# Patient Record
Sex: FEMALE | Race: WHITE | NOT HISPANIC OR LATINO | Employment: FULL TIME | URBAN - METROPOLITAN AREA
[De-identification: names, ages, dates, MRNs, and addresses within clinical notes are randomized per-mention and may not be internally consistent; named-entity substitution may affect disease eponyms.]

---

## 2019-10-24 ENCOUNTER — PRE VISIT (OUTPATIENT)
Dept: OTOLARYNGOLOGY | Facility: CLINIC | Age: 29
End: 2019-10-24

## 2019-10-24 NOTE — TELEPHONE ENCOUNTER
FUTURE VISIT INFORMATION      FUTURE VISIT INFORMATION:    Date: 10/30/19    Time: 10:00am    Location: [facility] [place]  REFERRAL INFORMATION:    Referring provider:  Dr. Kimberly Catalan    Referring providers clinic:  [facility]    Reason for visit/diagnosis  VCD    RECORDS REQUESTED FROM:       Clinic name Comments Records Status Imaging Status   [facility] ENT OV 10/21/19- Hossein Epic/Care Everywhere

## 2019-10-30 ENCOUNTER — TELEPHONE (OUTPATIENT)
Dept: OTOLARYNGOLOGY | Facility: CLINIC | Age: 29
End: 2019-10-30

## 2019-10-30 NOTE — PROGRESS NOTES
[facility] Voice [facility] of the [facility] Otolaryngology Clinic           Patient: Alise Orona  MRN: [id number]    : 1990  Age/Gender: 29 year old female  Date of Service: 2019       Referring Provider   PCP: Ernestina Oleary    Referring Physician:   none      Reason for Consultation   Dyspnea   Dysphonia  Heartburn    History     HISTORY OF PRESENT ILLNESS: Ms. 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  She has been having trouble breathing for the past couple years.  Her breathing has been stable, without any recent acute worsening.  She has seen a number of doctors prior to this was diagnosed with asthma received an inhaler which did not help much.  She was also diagnosed with reflux and use Prilosec for 2 weeks.  This also did not help her breathing however after she stopped the medication she felt more heartburn symptoms.  She went to 2 different ENTs where she was advised that she needs a dilation she comes today for a third opinion.  She was intubated possibly for an ovarian cyst removal in  she cannot remember.  She has autoimmune labs pending from a prior ENT provider.  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  This is also been going on for years.  She denies dysphagia.      PAST MEDICAL HISTORY: No past medical history on file.      PAST SURGICAL HISTORY: No past surgical history on file.  OVARIAN CYST REMOVAL        CURRENT MEDICATIONS:   Current Outpatient Medications:      buPROPion (WELLBUTRIN XL) 300 MG 24 hr tablet, , Disp: , Rfl:      clonazePAM (KLONOPIN) 0.125 mg quarter-tab, Take 2 tablets by mouth 2 times daily, Disp: , Rfl: 1     escitalopram (LEXAPRO) 20 MG tablet, , Disp: , Rfl:      fluticasone (FLOVENT HFA) 110 MCG/ACT inhaler, Inhale 1 puff into the lungs 2 times daily, Disp: 1 Inhaler, Rfl: 1     norgestimate-ethinyl estradiol  (ORTHO-CYCLEN/SPRINTEC) 0.25-35 MG-MCG tablet, TAKE 1 TABLET BY MOUTH DAILY, Disp: , Rfl:      omeprazole (PRILOSEC) 40 MG DR capsule, Take 1 capsule (40 mg) by mouth 2 times daily, Disp: 60 capsule, Rfl: 0     traZODone (DESYREL) 50 MG tablet, Take 0.5 to 3 tabs 30-60 minutes prior to sleep as needed for insomnia, Disp: , Rfl:      ALPRAZolam (XANAX PO), Take 0.5 mg by mouth 3 times daily as needed for anxiety, Disp: , Rfl:      Amphetamine-Dextroamphetamine (ADDERALL PO), Take 45 mg by mouth, Disp: , Rfl:      Escitalopram Oxalate (LEXAPRO PO), Take 20 mg by mouth daily, Disp: , Rfl:      norgestimate-ethinyl estradiol (ORTHO-CYCLEN, SPRINTEC) 0.25-35 MG-MCG tablet, Take 1 tablet by mouth daily, Disp: , Rfl:      TRAZODONE HCL PO, Take 50 mg by mouth At Bedtime, Disp: , Rfl:       ALLERGIES: Patient has no known allergies.      SOCIAL HISTORY:    Social History     Socioeconomic History     Marital status: Single     Spouse name: Not on file     Number of children: Not on file     Years of education: Not on file     Highest education level: Not on file   Occupational History     Not on file   Social Needs     Financial resource strain: Not on file     Food insecurity:     Worry: Not on file     Inability: Not on file     Transportation needs:     Medical: Not on file     Non-medical: Not on file   Tobacco Use     Smoking status: Never Smoker     Smokeless tobacco: Never Used   Substance and Sexual Activity     Alcohol use: Not on file     Drug use: Not on file     Sexual activity: Not on file   Lifestyle     Physical activity:     Days per week: Not on file     Minutes per session: Not on file     Stress: Not on file   Relationships     Social connections:     Talks on phone: Not on file     Gets together: Not on file     Attends [employer] service: Not on file     Active member of club or organization: Not on file     Attends meetings of clubs or organizations: Not on file     Relationship status: Not on file      Intimate partner violence:     Fear of current or ex partner: Not on file     Emotionally abused: Not on file     Physically abused: Not on file     Forced sexual activity: Not on file   Other Topics Concern     Parent/sibling w/ CABG, MI or angioplasty before 65F 55M? Not Asked   Social History Narrative     Not on file           FAMILY HISTORY: Non-contributory for problems with anesthesia      REVIEW OF SYSTEMS:   The patient was asked a 14 point review of systems regarding constitutional symptoms, eye symptoms, ears, nose, mouth, throat symptoms, cardiovascular symptoms, respiratory symptoms, gastrointestinal symptoms, genitourinary symptoms, musculoskeletal symptoms, integumentary symptoms, neurological symptoms, psychiatric symptoms, endocrine symptoms, hematologic/lymphatic symptoms, and allergic/ immunologic symptoms.   The pertinent factors have been included in the HPI and below.    Physical Examination     The patient underwent a physical examination as described below. The pertinent positive and negative findings are summarized after the description of the examination.    Constitutional: The patient's developmental and nutritional status was assessed. The patient's voice quality was assessed.  Head and Face: The head and face were inspected for deformities. The sinuses were palpated. The salivary glands were palpated. Facial muscle strength was assessed bilaterally.  Eyes: Extraocular movements and primary gaze alignment were assessed.  Ears, Nose, Mouth and Throat: The ears and nose were examined for deformities. The nasal septum, mucosa, and turbinates were inspected by anterior rhinoscopy. The lips, teeth, and gums were examined for abnormalities. The oral mucosa, tongue, palate, tonsils, lateral and posterior pharynx were inspected for the presence of asymmetry or mucosal lesions.    Neck: The tracheal position was noted, and the neck mass palpated to determine if there were any asymmetries, abnormal  neck masses, thyromegally, or thyroid nodules.  Respiratory: The nature of the breathing and chest expansion/symmetry was observed.  Cardiovascular: The patient was examined to determine the presence of any edema or jugular venous distension.  Abdomen: The contour of the abdomen was noted.  Lymphatic: The patient was examined for infraclavicular lymphadenopathy.  Musculoskeletal: The patient was inspected for the presence of skeletal deformities.  Extremities: The extremities were examined for any clubbing or cyanosis.  Skin: The skin was examined for inflammatory or neoplastic conditions.  Neurologic: The patient's orientation, mood, and affect were noted. The cranial nerve  functions were examined.    Other pertinent positive and negative findings on physical examination:   each ear: EAC clear, TM intact, no effusion  Anterior rhinoscopy: no lesions  OC/OP: no lesions, uvula midline, soft palate elevates symmetrically, FOM/BOT soft, 2+ tonsils  Neck: no lesions, no TH tenderness to palpation, palpable landmarks despite thick neck    All other physical examination findings were within normal limits and noncontributory.    Procedures   Video Laryngoscopy with Stroboscopy (CPT 70114) and Behavioral & Qualitative Evaluation of Voice and Resonence     Preoperative Diagnosis:  Dysphonia and throat symptoms  Postoperative Diagnosis: Dysphonia and throat symptoms  Indication:  Patient has new or persistent dysphonia and throat symptoms.  This requires evaluation by stroboscopy to fully delineate the laryngeal functioning; especially mucosal wave assessment, ultrasharp visualization of lesions on the vocal folds, and overall functioning of the larynx.  Details of Procedure: A 70 degree rigid telescopic laryngoscope or a distal chip flexible scope was used. The lighting was obtained via a light cable connected to a stroboscopic unit. The telescope was inserted either transorally or transnasally until the vocal folds could  be visualized. The patient was instructed to sustain the vowel  ee  at a comfortable pitch and loudness as the voice was monitored by a microphone connected to a fundamental frequency tracker. This circuit tracked vocal periodicity, allowing the light to flash in synchrony with the glottal cycles. A setting on the stroboscope was set to change the phase of light strobing with relation to the vocal fundamental frequency, producing an image of 1 to 2 glottal cycles every second. The video images were recorded for analysis. Use of the variable speed, slow and stop scan allowed careful study of pertinent segments of laryngeal function to increase accuracy of clinical assessments of function and dysfunction.  In particular, features of glottal closure, mucosal wave on the vocal fold cover and laryngeal symmetry were analyzed. Lastly, the patient was asked to phonate speech samples and auditory/perceptual evaluation of voice and resonance were performed.  The vocal quality was carefully evaluated for hoarseness, breathiness, loudness, phrase length and intelligibility to determine the source of dysphonia.    Findings:   A. BEHAVIORAL & QUALITATIVE EVALUATION OF VOICE AND RESONENCE   Comments: F0 257 MPT: 10sec  Vocal Quality: no stridor, needs deep breaths every few words    Pitch Range:  Normal   Phrase Length:  Normal  Vocal Loudness: Normal  Dysarthria: No    B. LARYNGOVIDEOSTROBOSCOPY   Anatomic/Physiological Deviations:  Severe subglottic stenosis  Bilateral vocal process granuloma  Supraglottic hyper function  Mucosal waves with mucus in question of left mid vocal fold thickening  Lingual tonsillar hypetrohpy  Mucosal wave: Right:  No restriction     Left: No restriction  Bilateral Vocal Fold Vibration: Asymmetric  Vocal Process: Right: No restriction    Left:  No restriction  Vocal Fold closure: Complete glottal closure    Complication(s): None  Disposition: Patient tolerated the procedure well              Review  of Relevant Clinical Data     Notes:Kimberly Catalan MD 10/21/19     Radiology: CXR 19 - Negative chest. The lungs are clear and there are no pleural effusions. Normal heart size.    Pathology: none    Procedures: none    Labs:  No results found for: TSH  No results found for: NA, CO2, BUN, CREAT, GLUCOSE, PHOS  No results found for: WBC, HGB, HCT, MCV, PLT  No results found for: TERI  No components found for: RHEUMATOIDFACTOR,  RF  No results found for: CRP  No components found for: CKTOT, URICACID  No components found for: C3, C4, DSDNAAB, NDNAABIFA  No results found for: MPOAB     Impression & Plan     IMPRESSION: Ms. Orona is a 29 year old female who is being seen for the followin.  Subglottic stenosis  - dyspnea due to subglottic stenosis  - discussed etiology of trauma (intubation), autoimmune or idiopathic. In this case this is most likely due to prior intubation and GERD. Will follow up with her autoimmune panel   - discussed treatment options of observation, management of reflux, medical options with PO steroids, in office options of steroid injection, endoscopic option of Co2 laser, dilation and injection of steroid and open resection options.  - will start with endoscopic procedure first  - anti-reflux management optimized   - start steroid inhaler as well     2.  LPR  -Vocal process granulomas and evidence of LPR on examination today  -We will start omeprazole 40 mg in the morning before breakfast antireflux precautions    3. 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     Thank you for the kind referral and for allowing me to share in the care of Ms. Orona. If you have any questions, please do not hesitate to  contact me.        Nadine Hoffmann MD    of Otolaryngology - Head and Neck Surgery   Voice, Airway, and Swallowing Disorders   [facility] Voice Clinic at the [facility] Surgery [address] [address]  Phone: [phone]  Fax: [phone]    [address] [address]  Phone: [phone]  Fax: [phone]     CC:  Kimberly Catalan MD Kathleen A Parker

## 2019-10-30 NOTE — TELEPHONE ENCOUNTER
FUTURE VISIT INFORMATION      FUTURE VISIT INFORMATION:    Date: 11/1/19    Time: 12PM    [place] [facility] [place]  REFERRAL INFORMATION:    Referring provider:  Dr. Kimberly Catalan    Referring providers clinic:   [facility] Ot[facility]    Reason for visit/diagnosis  VCD    RECORDS REQUESTED FROM:       Clinic name Comments Records Status Imaging Status   [place] [address] Otolaryngology 10/21/19 notes with Dr Catalan    11/6/19 MICRO DIRECT LARYNGOSCOPY, BALLOON DILATION SUBGLOTTIC STENOSIS with kenaolg and mitomycin C with Dr Catalan (scheduled)  Care Everywhere      [facility]  01/02/2019 ED notes with Ranjit Vizcaino MD   Care Everywhere

## 2019-11-01 ENCOUNTER — PREP FOR PROCEDURE (OUTPATIENT)
Dept: OTOLARYNGOLOGY | Facility: CLINIC | Age: 29
End: 2019-11-01

## 2019-11-01 ENCOUNTER — PRE VISIT (OUTPATIENT)
Dept: OTOLARYNGOLOGY | Facility: CLINIC | Age: 29
End: 2019-11-01

## 2019-11-01 ENCOUNTER — OFFICE VISIT (OUTPATIENT)
Dept: OTOLARYNGOLOGY | Facility: CLINIC | Age: 29
End: 2019-11-01
Payer: COMMERCIAL

## 2019-11-01 VITALS — BODY MASS INDEX: 41.65 KG/M2 | WEIGHT: 250 LBS | HEIGHT: 65 IN

## 2019-11-01 DIAGNOSIS — J38.6 SUBGLOTTIC STENOSIS: Primary | ICD-10-CM

## 2019-11-01 DIAGNOSIS — K21.9 LPRD (LARYNGOPHARYNGEAL REFLUX DISEASE): ICD-10-CM

## 2019-11-01 DIAGNOSIS — R49.0 DYSPHONIA: ICD-10-CM

## 2019-11-01 DIAGNOSIS — J38.3 VOCAL PROCESS GRANULOMA: ICD-10-CM

## 2019-11-01 RX ORDER — ESCITALOPRAM OXALATE 20 MG/1
TABLET ORAL
COMMUNITY
Start: 2018-07-10 | End: 2021-12-21

## 2019-11-01 RX ORDER — MULTIVIT,TX WITH IRON,MINERALS
400 TABLET, EXTENDED RELEASE ORAL DAILY
COMMUNITY
End: 2022-12-15

## 2019-11-01 RX ORDER — NORGESTIMATE AND ETHINYL ESTRADIOL 0.25-0.035
KIT ORAL
COMMUNITY
Start: 2019-09-05 | End: 2019-11-04

## 2019-11-01 RX ORDER — CEFAZOLIN SODIUM 1 G/50ML
1 INJECTION, SOLUTION INTRAVENOUS SEE ADMIN INSTRUCTIONS
Status: CANCELLED | OUTPATIENT
Start: 2019-11-01

## 2019-11-01 RX ORDER — OMEPRAZOLE 40 MG/1
40 CAPSULE, DELAYED RELEASE ORAL 2 TIMES DAILY
Qty: 60 CAPSULE | Refills: 0 | Status: ON HOLD | OUTPATIENT
Start: 2019-11-01 | End: 2019-11-05

## 2019-11-01 RX ORDER — CLONAZEPAM 0.5 MG/1
2 TABLET ORAL 2 TIMES DAILY
Refills: 1 | COMMUNITY
Start: 2019-02-04 | End: 2021-12-21

## 2019-11-01 RX ORDER — CHLORAL HYDRATE 500 MG
2 CAPSULE ORAL DAILY
COMMUNITY
End: 2022-03-18

## 2019-11-01 RX ORDER — BUPROPION HYDROCHLORIDE 300 MG/1
300 TABLET ORAL EVERY MORNING
COMMUNITY
Start: 2017-05-09

## 2019-11-01 RX ORDER — MULTIPLE VITAMINS W/ MINERALS TAB 9MG-400MCG
1 TAB ORAL DAILY
COMMUNITY
End: 2022-03-18

## 2019-11-01 RX ORDER — CEFAZOLIN SODIUM 2 G/50ML
2 SOLUTION INTRAVENOUS
Status: CANCELLED | OUTPATIENT
Start: 2019-11-01

## 2019-11-01 RX ORDER — FLUTICASONE PROPIONATE 110 UG/1
1 AEROSOL, METERED RESPIRATORY (INHALATION) 2 TIMES DAILY
Qty: 1 INHALER | Refills: 1 | Status: SHIPPED | OUTPATIENT
Start: 2019-11-01 | End: 2019-12-01

## 2019-11-01 RX ORDER — TRAZODONE HYDROCHLORIDE 50 MG/1
TABLET, FILM COATED ORAL
COMMUNITY
Start: 2015-06-22 | End: 2022-06-17

## 2019-11-01 ASSESSMENT — PAIN SCALES - GENERAL: PAINLEVEL: NO PAIN (0)

## 2019-11-01 ASSESSMENT — MIFFLIN-ST. JEOR: SCORE: 1854.87

## 2019-11-01 NOTE — NURSING NOTE
Pre-surgery teaching completed for:  Endoscopic option of CO2 laser dilation and injection of steroid and open resection options   Physician:  Dr. Hoffmann    Teaching completed via phone: No  Teaching completed in clinic:  Yes    Teaching completed with Patient and mother   present Not applicable    Surgical booklet given  Yes  Pre-surgery scrub given Yes    Pneumovax guidelines given:  Not applicable    Reviewed pre-surgical guidelines including:    Pre-surgery physical exam requirements:  No, had it completed on 10/24/19 at [facility] requirements: Yes  Same-day surgery, must arrange for an adult to take you home and stay with you for the first 24 hours after surgery.  Stop drinking alcohol at least 24 hours before surgery.  Quit or at least cut down smoking as it higher your risks for infection after surgery. No chewing tobacco for at least 8 hours before surgery.  Take a bath or shower the night before and morning of surgery. Use antiseptic soap.

## 2019-11-01 NOTE — PATIENT INSTRUCTIONS
You were seen in the ENT Clinic today by Dr. Hoffmann  If you have any questions or concerns after your appointment, please call   -  ENT Clinic: [phone] scheduling option 1 and nurse advice option 3.    -  Recommendation: take reflux meds as prescribed.   -  Schedule surgery to open airway.             Thank you for choosing [facility] and allowing us to be apart of your care team!  Louann Young LPN

## 2019-11-01 NOTE — LETTER
2019       RE: Alise Orona  [address]  [place] [address]     Dear Colleague,    Thank you for referring your patient, Alise Orona, to the [facility] EAR NOSE AND THROAT at [facility]. Please see a copy of my visit note below.        [facility]ol[facility]           Patient: Alise Orona  MRN: [id number]    : 1990  Age/Gender: 29 year old female  Date of Service: 2019       Referring Provider   PCP: Ernestina Oleary    Referring Physician:   none      Reason for Consultation   Dyspnea   Dysphonia  Heartburn    History     HISTORY OF PRESENT ILLNESS: Ms. 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  She has been having trouble breathing for the past couple years.  Her breathing has been stable, without any recent acute worsening.  She has seen a number of doctors prior to this was diagnosed with asthma received an inhaler which did not help much.  She was also diagnosed with reflux and use Prilosec for 2 weeks.  This also did not help her breathing however after she stopped the medication she felt more heartburn symptoms.  She went to 2 different ENTs where she was advised that she needs a dilation she comes today for a third opinion.  She was intubated possibly for an ovarian cyst removal in  she cannot remember.  She has autoimmune labs pending from a prior ENT provider.  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  This is also been going on for years.  She denies dysphagia.      PAST MEDICAL HISTORY: No past medical history on file.      PAST SURGICAL HISTORY: No past surgical history on file.  OVARIAN CYST REMOVAL        CURRENT MEDICATIONS:   Current Outpatient Medications:      buPROPion (WELLBUTRIN XL) 300 MG 24 hr tablet, , Disp: , Rfl:      clonazePAM  (KLONOPIN) 0.125 mg quarter-tab, Take 2 tablets by mouth 2 times daily, Disp: , Rfl: 1     escitalopram (LEXAPRO) 20 MG tablet, , Disp: , Rfl:      fluticasone (FLOVENT HFA) 110 MCG/ACT inhaler, Inhale 1 puff into the lungs 2 times daily, Disp: 1 Inhaler, Rfl: 1     norgestimate-ethinyl estradiol (ORTHO-CYCLEN/SPRINTEC) 0.25-35 MG-MCG tablet, TAKE 1 TABLET BY MOUTH DAILY, Disp: , Rfl:      omeprazole (PRILOSEC) 40 MG DR capsule, Take 1 capsule (40 mg) by mouth 2 times daily, Disp: 60 capsule, Rfl: 0     traZODone (DESYREL) 50 MG tablet, Take 0.5 to 3 tabs 30-60 minutes prior to sleep as needed for insomnia, Disp: , Rfl:      ALPRAZolam (XANAX PO), Take 0.5 mg by mouth 3 times daily as needed for anxiety, Disp: , Rfl:      Amphetamine-Dextroamphetamine (ADDERALL PO), Take 45 mg by mouth, Disp: , Rfl:      Escitalopram Oxalate (LEXAPRO PO), Take 20 mg by mouth daily, Disp: , Rfl:      norgestimate-ethinyl estradiol (ORTHO-CYCLEN, SPRINTEC) 0.25-35 MG-MCG tablet, Take 1 tablet by mouth daily, Disp: , Rfl:      TRAZODONE HCL PO, Take 50 mg by mouth At Bedtime, Disp: , Rfl:       ALLERGIES: Patient has no known allergies.      SOCIAL HISTORY:    Social History     Socioeconomic History     Marital status: Single     Spouse name: Not on file     Number of children: Not on file     Years of education: Not on file     Highest education level: Not on file   Occupational History     Not on file   Social Needs     Financial resource strain: Not on file     Food insecurity:     Worry: Not on file     Inability: Not on file     Transportation needs:     Medical: Not on file     Non-medical: Not on file   Tobacco Use     Smoking status: Never Smoker     Smokeless tobacco: Never Used   Substance and Sexual Activity     Alcohol use: Not on file     Drug use: Not on file     Sexual activity: Not on file   Lifestyle     Physical activity:     Days per week: Not on file     Minutes per session: Not on file     Stress: Not on file    Relationships     Social connections:     Talks on phone: Not on file     Gets together: Not on file     Attends Yarsani service: Not on file     Active member of club or organization: Not on file     Attends meetings of clubs or organizations: Not on file     Relationship status: Not on file     Intimate partner violence:     Fear of current or ex partner: Not on file     Emotionally abused: Not on file     Physically abused: Not on file     Forced sexual activity: Not on file   Other Topics Concern     Parent/sibling w/ CABG, MI or angioplasty before 65F 55M? Not Asked   Social History Narrative     Not on file           FAMILY HISTORY: Non-contributory for problems with anesthesia      REVIEW OF SYSTEMS:   The patient was asked a 14 point review of systems regarding constitutional symptoms, eye symptoms, ears, nose, mouth, throat symptoms, cardiovascular symptoms, respiratory symptoms, gastrointestinal symptoms, genitourinary symptoms, musculoskeletal symptoms, integumentary symptoms, neurological symptoms, psychiatric symptoms, endocrine symptoms, hematologic/lymphatic symptoms, and allergic/ immunologic symptoms.   The pertinent factors have been included in the HPI and below.    Physical Examination     The patient underwent a physical examination as described below. The pertinent positive and negative findings are summarized after the description of the examination.    Constitutional: The patient's developmental and nutritional status was assessed. The patient's voice quality was assessed.  Head and Face: The head and face were inspected for deformities. The sinuses were palpated. The salivary glands were palpated. Facial muscle strength was assessed bilaterally.  Eyes: Extraocular movements and primary gaze alignment were assessed.  Ears, Nose, Mouth and Throat: The ears and nose were examined for deformities. The nasal septum, mucosa, and turbinates were inspected by anterior rhinoscopy. The lips,  teeth, and gums were examined for abnormalities. The oral mucosa, tongue, palate, tonsils, lateral and posterior pharynx were inspected for the presence of asymmetry or mucosal lesions.    Neck: The tracheal position was noted, and the neck mass palpated to determine if there were any asymmetries, abnormal neck masses, thyromegally, or thyroid nodules.  Respiratory: The nature of the breathing and chest expansion/symmetry was observed.  Cardiovascular: The patient was examined to determine the presence of any edema or jugular venous distension.  Abdomen: The contour of the abdomen was noted.  Lymphatic: The patient was examined for infraclavicular lymphadenopathy.  Musculoskeletal: The patient was inspected for the presence of skeletal deformities.  Extremities: The extremities were examined for any clubbing or cyanosis.  Skin: The skin was examined for inflammatory or neoplastic conditions.  Neurologic: The patient's orientation, mood, and affect were noted. The cranial nerve  functions were examined.    Other pertinent positive and negative findings on physical examination:   each ear: EAC clear, TM intact, no effusion  Anterior rhinoscopy: no lesions  OC/OP: no lesions, uvula midline, soft palate elevates symmetrically, FOM/BOT soft, 2+ tonsils  Neck: no lesions, no TH tenderness to palpation, palpable landmarks despite thick neck    All other physical examination findings were within normal limits and noncontributory.    Procedures   Video Laryngoscopy with Stroboscopy (CPT 54049) and Behavioral & Qualitative Evaluation of Voice and Resonence     Preoperative Diagnosis:  Dysphonia and throat symptoms  Postoperative Diagnosis: Dysphonia and throat symptoms  Indication:  Patient has new or persistent dysphonia and throat symptoms.  This requires evaluation by stroboscopy to fully delineate the laryngeal functioning; especially mucosal wave assessment, ultrasharp visualization of lesions on the vocal folds, and  overall functioning of the larynx.  Details of Procedure: A 70 degree rigid telescopic laryngoscope or a distal chip flexible scope was used. The lighting was obtained via a light cable connected to a stroboscopic unit. The telescope was inserted either transorally or transnasally until the vocal folds could be visualized. The patient was instructed to sustain the vowel  ee  at a comfortable pitch and loudness as the voice was monitored by a microphone connected to a fundamental frequency tracker. This circuit tracked vocal periodicity, allowing the light to flash in synchrony with the glottal cycles. A setting on the stroboscope was set to change the phase of light strobing with relation to the vocal fundamental frequency, producing an image of 1 to 2 glottal cycles every second. The video images were recorded for analysis. Use of the variable speed, slow and stop scan allowed careful study of pertinent segments of laryngeal function to increase accuracy of clinical assessments of function and dysfunction.  In particular, features of glottal closure, mucosal wave on the vocal fold cover and laryngeal symmetry were analyzed. Lastly, the patient was asked to phonate speech samples and auditory/perceptual evaluation of voice and resonance were performed.  The vocal quality was carefully evaluated for hoarseness, breathiness, loudness, phrase length and intelligibility to determine the source of dysphonia.    Findings:   A. BEHAVIORAL & QUALITATIVE EVALUATION OF VOICE AND RESONENCE   Comments: F0 257 MPT: 10sec  Vocal Quality: no stridor, needs deep breaths every few words    Pitch Range:  Normal   Phrase Length:  Normal  Vocal Loudness: Normal  Dysarthria: No    B. LARYNGOVIDEOSTROBOSCOPY   Anatomic/Physiological Deviations:  Severe subglottic stenosis  Bilateral vocal process granuloma  Supraglottic hyper function  Mucosal waves with mucus in question of left mid vocal fold thickening  Lingual tonsillar  hypetrohpy  Mucosal wave: Right:  No restriction     Left: No restriction  Bilateral Vocal Fold Vibration: Asymmetric  Vocal Process: Right: No restriction    Left:  No restriction  Vocal Fold closure: Complete glottal closure    Complication(s): None  Disposition: Patient tolerated the procedure well              Review of Relevant Clinical Data     Notes:Kimberly Catalan MD 10/21/19     Radiology: CXR 19 - Negative chest. The lungs are clear and there are no pleural effusions. Normal heart size.    Pathology: none    Procedures: none    Labs:  No results found for: TSH  No results found for: NA, CO2, BUN, CREAT, GLUCOSE, PHOS  No results found for: WBC, HGB, HCT, MCV, PLT  No results found for: TERI  No components found for: RHEUMATOIDFACTOR,  RF  No results found for: CRP  No components found for: CKTOT, URICACID  No components found for: C3, C4, DSDNAAB, NDNAABIFA  No results found for: MPOAB     Impression & Plan     IMPRESSION: Ms. Orona is a 29 year old female who is being seen for the followin.  Subglottic stenosis  - dyspnea due to subglottic stenosis  - discussed etiology of trauma (intubation), autoimmune or idiopathic. In this case this is most likely due to prior intubation and GERD. Will follow up with her autoimmune panel   - discussed treatment options of observation, management of reflux, medical options with PO steroids, in office options of steroid injection, endoscopic option of Co2 laser, dilation and injection of steroid and open resection options.  - will start with endoscopic procedure first  - anti-reflux management optimized   - start steroid inhaler as well     2.  LPR  -Vocal process granulomas and evidence of LPR on examination today  -We will start omeprazole 40 mg in the morning before breakfast antireflux precautions    3. 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     Thank you for the kind referral and for allowing me to share in the care of Ms. Orona. If you have any questions, please do not hesitate to contact me.        Nadine Hoffmann MD    of Otolaryngology - Head and Neck Surgery   Voice, Airway, and Swallowing Disorders   [facility] at the [facility] [place] [address]  Phone: [phone]  Fax: [phone]    [place] [address]  Phone: [phone]  Fax: [phone]     CC:  Kimberly Catalan MD Kathleen A Parker

## 2019-11-01 NOTE — NURSING NOTE
"Chief Complaint   Patient presents with     Consult     Subglottic stenosis     Height 1.651 m (5' 5\"), weight 113.4 kg (250 lb).    Kimberly Cortez, EMT    "

## 2019-11-05 ENCOUNTER — HOSPITAL ENCOUNTER (OUTPATIENT)
Facility: CLINIC | Age: 29
Discharge: HOME | End: 2019-11-05
Attending: OTOLARYNGOLOGY | Admitting: OTOLARYNGOLOGY
Payer: COMMERCIAL

## 2019-11-05 ENCOUNTER — ANESTHESIA EVENT (OUTPATIENT)
Dept: SURGERY | Facility: CLINIC | Age: 29
End: 2019-11-05
Payer: COMMERCIAL

## 2019-11-05 ENCOUNTER — ANESTHESIA (OUTPATIENT)
Dept: SURGERY | Facility: CLINIC | Age: 29
End: 2019-11-05
Payer: COMMERCIAL

## 2019-11-05 VITALS
SYSTOLIC BLOOD PRESSURE: 129 MMHG | HEIGHT: 65 IN | OXYGEN SATURATION: 94 % | RESPIRATION RATE: 16 BRPM | BODY MASS INDEX: 41.95 KG/M2 | DIASTOLIC BLOOD PRESSURE: 78 MMHG | WEIGHT: 251.77 LBS | HEART RATE: 79 BPM | TEMPERATURE: 98.5 F

## 2019-11-05 DIAGNOSIS — J38.6 SUBGLOTTIC STENOSIS: ICD-10-CM

## 2019-11-05 DIAGNOSIS — J38.3 VOCAL PROCESS GRANULOMA: ICD-10-CM

## 2019-11-05 DIAGNOSIS — K21.9 LPRD (LARYNGOPHARYNGEAL REFLUX DISEASE): ICD-10-CM

## 2019-11-05 LAB
GLUCOSE BLDC GLUCOMTR-MCNC: 101 MG/DL (ref 70–99)
HCG UR QL: NEGATIVE

## 2019-11-05 PROCEDURE — 71000027 ZZH RECOVERY PHASE 2 EACH 15 MINS: Performed by: OTOLARYNGOLOGY

## 2019-11-05 PROCEDURE — 36000062 ZZH SURGERY LEVEL 4 1ST 30 MIN - UMMC: Performed by: OTOLARYNGOLOGY

## 2019-11-05 PROCEDURE — 25000125 ZZHC RX 250: Performed by: NURSE ANESTHETIST, CERTIFIED REGISTERED

## 2019-11-05 PROCEDURE — 25800030 ZZH RX IP 258 OP 636: Performed by: ANESTHESIOLOGY

## 2019-11-05 PROCEDURE — 71000015 ZZH RECOVERY PHASE 1 LEVEL 2 EA ADDTL HR: Performed by: OTOLARYNGOLOGY

## 2019-11-05 PROCEDURE — 25000132 ZZH RX MED GY IP 250 OP 250 PS 637: Performed by: ANESTHESIOLOGY

## 2019-11-05 PROCEDURE — 36000064 ZZH SURGERY LEVEL 4 EA 15 ADDTL MIN - UMMC: Performed by: OTOLARYNGOLOGY

## 2019-11-05 PROCEDURE — 81025 URINE PREGNANCY TEST: CPT | Performed by: ANESTHESIOLOGY

## 2019-11-05 PROCEDURE — 25000128 H RX IP 250 OP 636: Performed by: NURSE ANESTHETIST, CERTIFIED REGISTERED

## 2019-11-05 PROCEDURE — 25000128 H RX IP 250 OP 636: Performed by: ANESTHESIOLOGY

## 2019-11-05 PROCEDURE — 82962 GLUCOSE BLOOD TEST: CPT

## 2019-11-05 PROCEDURE — 40000170 ZZH STATISTIC PRE-PROCEDURE ASSESSMENT II: Performed by: OTOLARYNGOLOGY

## 2019-11-05 PROCEDURE — 27210794 ZZH OR GENERAL SUPPLY STERILE: Performed by: OTOLARYNGOLOGY

## 2019-11-05 PROCEDURE — 25000125 ZZHC RX 250: Performed by: OTOLARYNGOLOGY

## 2019-11-05 PROCEDURE — C1726 CATH, BAL DIL, NON-VASCULAR: HCPCS | Performed by: OTOLARYNGOLOGY

## 2019-11-05 PROCEDURE — 37000009 ZZH ANESTHESIA TECHNICAL FEE, EACH ADDTL 15 MIN: Performed by: OTOLARYNGOLOGY

## 2019-11-05 PROCEDURE — 37000008 ZZH ANESTHESIA TECHNICAL FEE, 1ST 30 MIN: Performed by: OTOLARYNGOLOGY

## 2019-11-05 PROCEDURE — 71000014 ZZH RECOVERY PHASE 1 LEVEL 2 FIRST HR: Performed by: OTOLARYNGOLOGY

## 2019-11-05 PROCEDURE — 25000128 H RX IP 250 OP 636: Performed by: OTOLARYNGOLOGY

## 2019-11-05 PROCEDURE — 27211024 ZZHC OR SUPPLY OTHER OPNP: Performed by: OTOLARYNGOLOGY

## 2019-11-05 RX ORDER — ONDANSETRON 2 MG/ML
INJECTION INTRAMUSCULAR; INTRAVENOUS PRN
Status: DISCONTINUED | OUTPATIENT
Start: 2019-11-05 | End: 2019-11-05

## 2019-11-05 RX ORDER — ONDANSETRON 4 MG/1
4-8 TABLET, ORALLY DISINTEGRATING ORAL EVERY 8 HOURS PRN
Qty: 4 TABLET | Refills: 0 | Status: SHIPPED | OUTPATIENT
Start: 2019-11-05

## 2019-11-05 RX ORDER — NALOXONE HYDROCHLORIDE 0.4 MG/ML
.1-.4 INJECTION, SOLUTION INTRAMUSCULAR; INTRAVENOUS; SUBCUTANEOUS
Status: DISCONTINUED | OUTPATIENT
Start: 2019-11-05 | End: 2019-11-05 | Stop reason: HOSPADM

## 2019-11-05 RX ORDER — DEXAMETHASONE SODIUM PHOSPHATE 4 MG/ML
INJECTION, SOLUTION INTRA-ARTICULAR; INTRALESIONAL; INTRAMUSCULAR; INTRAVENOUS; SOFT TISSUE PRN
Status: DISCONTINUED | OUTPATIENT
Start: 2019-11-05 | End: 2019-11-05

## 2019-11-05 RX ORDER — OXYMETAZOLINE HYDROCHLORIDE 0.05 G/100ML
SPRAY NASAL PRN
Status: DISCONTINUED | OUTPATIENT
Start: 2019-11-05 | End: 2019-11-05 | Stop reason: HOSPADM

## 2019-11-05 RX ORDER — ACETAMINOPHEN 325 MG/1
975 TABLET ORAL ONCE
Status: COMPLETED | OUTPATIENT
Start: 2019-11-05 | End: 2019-11-05

## 2019-11-05 RX ORDER — CEFAZOLIN SODIUM 2 G/100ML
2 INJECTION, SOLUTION INTRAVENOUS
Status: COMPLETED | OUTPATIENT
Start: 2019-11-05 | End: 2019-11-05

## 2019-11-05 RX ORDER — CEFAZOLIN SODIUM 1 G/3ML
1 INJECTION, POWDER, FOR SOLUTION INTRAMUSCULAR; INTRAVENOUS SEE ADMIN INSTRUCTIONS
Status: DISCONTINUED | OUTPATIENT
Start: 2019-11-05 | End: 2019-11-05 | Stop reason: HOSPADM

## 2019-11-05 RX ORDER — PROPOFOL 10 MG/ML
INJECTION, EMULSION INTRAVENOUS CONTINUOUS PRN
Status: DISCONTINUED | OUTPATIENT
Start: 2019-11-05 | End: 2019-11-05

## 2019-11-05 RX ORDER — ACETAMINOPHEN 325 MG/1
650 TABLET ORAL
Status: DISCONTINUED | OUTPATIENT
Start: 2019-11-05 | End: 2019-11-05 | Stop reason: HOSPADM

## 2019-11-05 RX ORDER — CEPHALEXIN 500 MG/1
500 CAPSULE ORAL 2 TIMES DAILY
Qty: 10 CAPSULE | Refills: 0 | Status: SHIPPED | OUTPATIENT
Start: 2019-11-05 | End: 2019-11-10

## 2019-11-05 RX ORDER — FENTANYL CITRATE 50 UG/ML
INJECTION, SOLUTION INTRAMUSCULAR; INTRAVENOUS PRN
Status: DISCONTINUED | OUTPATIENT
Start: 2019-11-05 | End: 2019-11-05

## 2019-11-05 RX ORDER — ONDANSETRON 2 MG/ML
4 INJECTION INTRAMUSCULAR; INTRAVENOUS EVERY 30 MIN PRN
Status: DISCONTINUED | OUTPATIENT
Start: 2019-11-05 | End: 2019-11-05 | Stop reason: HOSPADM

## 2019-11-05 RX ORDER — PROPOFOL 10 MG/ML
INJECTION, EMULSION INTRAVENOUS PRN
Status: DISCONTINUED | OUTPATIENT
Start: 2019-11-05 | End: 2019-11-05

## 2019-11-05 RX ORDER — MEPERIDINE HYDROCHLORIDE 25 MG/ML
12.5 INJECTION INTRAMUSCULAR; INTRAVENOUS; SUBCUTANEOUS
Status: DISCONTINUED | OUTPATIENT
Start: 2019-11-05 | End: 2019-11-05 | Stop reason: HOSPADM

## 2019-11-05 RX ORDER — FENTANYL CITRATE 50 UG/ML
25-50 INJECTION, SOLUTION INTRAMUSCULAR; INTRAVENOUS EVERY 5 MIN PRN
Status: DISCONTINUED | OUTPATIENT
Start: 2019-11-05 | End: 2019-11-05 | Stop reason: HOSPADM

## 2019-11-05 RX ORDER — ONDANSETRON 4 MG/1
4 TABLET, ORALLY DISINTEGRATING ORAL EVERY 30 MIN PRN
Status: DISCONTINUED | OUTPATIENT
Start: 2019-11-05 | End: 2019-11-05 | Stop reason: HOSPADM

## 2019-11-05 RX ORDER — LIDOCAINE HYDROCHLORIDE 20 MG/ML
INJECTION, SOLUTION INFILTRATION; PERINEURAL PRN
Status: DISCONTINUED | OUTPATIENT
Start: 2019-11-05 | End: 2019-11-05

## 2019-11-05 RX ORDER — ONDANSETRON 4 MG/1
4 TABLET, ORALLY DISINTEGRATING ORAL
Status: DISCONTINUED | OUTPATIENT
Start: 2019-11-05 | End: 2019-11-05 | Stop reason: HOSPADM

## 2019-11-05 RX ORDER — TRIAMCINOLONE ACETONIDE 40 MG/ML
INJECTION, SUSPENSION INTRA-ARTICULAR; INTRAMUSCULAR PRN
Status: DISCONTINUED | OUTPATIENT
Start: 2019-11-05 | End: 2019-11-05 | Stop reason: HOSPADM

## 2019-11-05 RX ORDER — LIDOCAINE 40 MG/G
CREAM TOPICAL
Status: DISCONTINUED | OUTPATIENT
Start: 2019-11-05 | End: 2019-11-05 | Stop reason: HOSPADM

## 2019-11-05 RX ORDER — SODIUM CHLORIDE, SODIUM LACTATE, POTASSIUM CHLORIDE, CALCIUM CHLORIDE 600; 310; 30; 20 MG/100ML; MG/100ML; MG/100ML; MG/100ML
INJECTION, SOLUTION INTRAVENOUS CONTINUOUS
Status: DISCONTINUED | OUTPATIENT
Start: 2019-11-05 | End: 2019-11-05 | Stop reason: HOSPADM

## 2019-11-05 RX ORDER — ACETAMINOPHEN 325 MG/1
650 TABLET ORAL EVERY 4 HOURS PRN
Qty: 50 TABLET | Refills: 0 | Status: SHIPPED | OUTPATIENT
Start: 2019-11-05

## 2019-11-05 RX ORDER — LIDOCAINE HYDROCHLORIDE 40 MG/ML
SOLUTION TOPICAL PRN
Status: DISCONTINUED | OUTPATIENT
Start: 2019-11-05 | End: 2019-11-05 | Stop reason: HOSPADM

## 2019-11-05 RX ORDER — OMEPRAZOLE 40 MG/1
40 CAPSULE, DELAYED RELEASE ORAL DAILY
Qty: 60 CAPSULE | Refills: 0 | Status: SHIPPED | OUTPATIENT
Start: 2019-11-05 | End: 2020-01-27

## 2019-11-05 RX ADMIN — FENTANYL CITRATE 50 MCG: 50 INJECTION, SOLUTION INTRAMUSCULAR; INTRAVENOUS at 13:00

## 2019-11-05 RX ADMIN — FENTANYL CITRATE 150 MCG: 50 INJECTION, SOLUTION INTRAMUSCULAR; INTRAVENOUS at 11:06

## 2019-11-05 RX ADMIN — PROPOFOL 50 MG: 10 INJECTION, EMULSION INTRAVENOUS at 11:40

## 2019-11-05 RX ADMIN — ONDANSETRON 4 MG: 2 INJECTION INTRAMUSCULAR; INTRAVENOUS at 11:30

## 2019-11-05 RX ADMIN — PROPOFOL 180 MG: 10 INJECTION, EMULSION INTRAVENOUS at 10:42

## 2019-11-05 RX ADMIN — ACETAMINOPHEN 975 MG: 325 TABLET, FILM COATED ORAL at 13:15

## 2019-11-05 RX ADMIN — SODIUM CHLORIDE, POTASSIUM CHLORIDE, SODIUM LACTATE AND CALCIUM CHLORIDE: 600; 310; 30; 20 INJECTION, SOLUTION INTRAVENOUS at 10:33

## 2019-11-05 RX ADMIN — PROPOFOL 20 MG: 10 INJECTION, EMULSION INTRAVENOUS at 11:05

## 2019-11-05 RX ADMIN — FENTANYL CITRATE 25 MCG: 50 INJECTION, SOLUTION INTRAMUSCULAR; INTRAVENOUS at 12:44

## 2019-11-05 RX ADMIN — CEFAZOLIN SODIUM 2 G: 2 INJECTION, SOLUTION INTRAVENOUS at 10:48

## 2019-11-05 RX ADMIN — LIDOCAINE HYDROCHLORIDE 100 MG: 20 INJECTION, SOLUTION INFILTRATION; PERINEURAL at 10:42

## 2019-11-05 RX ADMIN — DEXAMETHASONE SODIUM PHOSPHATE 10 MG: 4 INJECTION, SOLUTION INTRA-ARTICULAR; INTRALESIONAL; INTRAMUSCULAR; INTRAVENOUS; SOFT TISSUE at 10:49

## 2019-11-05 RX ADMIN — PROPOFOL 200 MCG/KG/MIN: 10 INJECTION, EMULSION INTRAVENOUS at 10:42

## 2019-11-05 RX ADMIN — FENTANYL CITRATE 50 MCG: 50 INJECTION, SOLUTION INTRAMUSCULAR; INTRAVENOUS at 10:42

## 2019-11-05 RX ADMIN — MIDAZOLAM 2 MG: 1 INJECTION INTRAMUSCULAR; INTRAVENOUS at 10:33

## 2019-11-05 ASSESSMENT — MIFFLIN-ST. JEOR: SCORE: 1862.88

## 2019-11-05 NOTE — DISCHARGE INSTRUCTIONS
[facility]  Same-Day Surgery   Adult Discharge Orders & Instructions     For 24 hours after surgery    1. Get plenty of rest.  A responsible adult must stay with you for at least 24 hours after you leave the hospital.   2. Do not drive or use heavy equipment.  If you have weakness or tingling, don't drive or use heavy equipment until this feeling goes away.  3. Do not drink alcohol.  4. Avoid strenuous or risky activities.  Ask for help when climbing stairs.   5. You may feel lightheaded.  IF so, sit for a few minutes before standing.  Have someone help you get up.   6. If you have nausea (feel sick to your stomach): Drink only clear liquids such as apple juice, ginger ale, broth or 7-Up.  Rest may also help.  Be sure to drink enough fluids.  Move to a regular diet as you feel able.  7. You may have a slight fever. Call the doctor if your fever is over 100 F (37.7 C) (taken under the tongue) or lasts longer than 24 hours.  8. You may have a dry mouth, a sore throat, muscle aches or trouble sleeping.  These should go away after 24 hours.  9. Do not make important or legal decisions.   Call your doctor for any of the followin.  Signs of infection (fever, growing tenderness at the surgery site, a large amount of drainage or bleeding, severe pain, foul-smelling drainage, redness, swelling).    2. It has been over 8 to 10 hours since surgery and you are still not able to urinate (pass water).    3.  Headache for over 24 hours.    To contact a doctor, call Dr. Hoffmann at her clinic call [phone] or:        [phone] and ask for the resident on call for   ENT (answered 24 hours a day)      Emergency Department:    [facility]: [phone]       (TTY for hearing impaired: [phone]

## 2019-11-05 NOTE — ANESTHESIA PREPROCEDURE EVALUATION
Anesthesia Pre-Procedure Evaluation    Patient: Alise Orona   MRN:     [id number] Gender:   female   Age:    29 year old :      1990        Preoperative Diagnosis: Subglottic stenosis [J38.6]   Procedure(s):  microdirect laryngoscopy, flexible bronchoscopy, flexible CO2 laser laryngoscopy with excision of stenosis, laryngoscopy with dilation, possible flexible esophagoscopy  INJECTION, STEROID     History reviewed. No pertinent past medical history.   Past Surgical History:   Procedure Laterality Date     GYN SURGERY      lap removal of ovarian cyst           Anesthesia Evaluation     .             ROS/MED HX    ENT/Pulmonary: Comment: Subglottic stenosis causing loud breathing, occasional SOB      Neurologic:  - neg neurologic ROS     Cardiovascular:  - neg cardiovascular ROS       METS/Exercise Tolerance:     Hematologic:  - neg hematologic  ROS       Musculoskeletal:  - neg musculoskeletal ROS       GI/Hepatic:  - neg GI/hepatic ROS       Renal/Genitourinary:  - ROS Renal section negative       Endo:     (+) Obesity, .      Psychiatric:  - neg psychiatric ROS       Infectious Disease:  - neg infectious disease ROS       Malignancy:         Other:                         PHYSICAL EXAM:   Mental Status/Neuro:    Airway: Facies: Feasible  Mallampati: I  Mouth/Opening: Full  TM distance: > 6 cm  Neck ROM: Full   Respiratory: Auscultation: CTAB     Resp. Rate: Normal     Resp. Effort: Normal      CV: Rhythm: Regular  Rate: Age appropriate  Heart: Normal Sounds  Edema: None   Comments:      Dental: Normal Dentition                LABS:  CBC: No results found for: WBC, HGB, HCT, PLT  BMP: No results found for: NA, POTASSIUM, CHLORIDE, CO2, BUN, CR, GLC  COAGS: No results found for: PTT, INR, FIBR  POC:   Lab Results   Component Value Date     (H) 2019    HCG Negative 2019     OTHER: No results found for: PH, LACT, A1C, MONTEZ, PHOS, MAG, ALBUMIN, PROTTOTAL, ALT, AST, GGT, ALKPHOS, BILITOTAL,  "BILIDIRECT, LIPASE, AMYLASE, PRISCILLA, TSH, T4, T3, CRP, SED     Preop Vitals    BP Readings from Last 3 Encounters:   11/05/19 119/62    Pulse Readings from Last 3 Encounters:   11/05/19 82      Resp Readings from Last 3 Encounters:   11/05/19 18    SpO2 Readings from Last 3 Encounters:   11/05/19 99%      Temp Readings from Last 1 Encounters:   11/05/19 36.8  C (98.2  F) (Oral)    Ht Readings from Last 1 Encounters:   11/05/19 1.651 m (5' 5\")      Wt Readings from Last 1 Encounters:   11/05/19 114.2 kg (251 lb 12.3 oz)    Estimated body mass index is 41.9 kg/m  as calculated from the following:    Height as of this encounter: 1.651 m (5' 5\").    Weight as of this encounter: 114.2 kg (251 lb 12.3 oz).     LDA:  Peripheral IV 11/05/19 Right Hand (Active)   Number of days: 0       Airway - Adult/Peds laryngeal mask airway (Active)   Number of days: 0        Assessment:   ASA SCORE: 2    H&P: History and physical reviewed and following examination; no interval change.   Smoking Status:  Non-Smoker/Unknown   NPO Status: NPO Appropriate     Plan:   Anes. Type:  General   Pre-Medication: Midazolam   Induction:  IV (Standard)   Airway: ETT; Oral   Access/Monitoring: PIV   Maintenance: Balanced     Postop Plan:   Postop Pain: None  Postop Sedation/Airway: Not planned  Disposition: Outpatient     PONV Management:   Adult Risk Factors: Female, Non-Smoker   Prevention: Ondansetron, Dexamethasone     CONSENT: Direct conversation   Plan and risks discussed with: Patient   Blood Products: Consent Deferred (Minimal Blood Loss)                   Claribel Gonzalez MD  "

## 2019-11-05 NOTE — OP NOTE
Operative Note   Otolaryngology - Head and Neck Surgery       DATE OF OPERATION:   November 5, 2019    PREOPERATIVE DIAGNOSIS:   Subglottic laryngeal stenosis.      POSTOPERATIVE DIAGNOSIS:   Same    NAME OF OPERATION:   1. Flexible bronchoscopy with CO2 laser of tracheal stenosis.   2. Flexible bronchoscopy with balloon dilation of tracheal stenosis to 12 mmHg.   3. Flexible bronchoscopy with injection of kenalog steroid to subglottic larynx  4. Flexible esophagoscopy     ANESTHESIA  Type: General  ETT: LMA    SURGEON:   Nadine Hoffmann MD    INDICATIONS FOR PROCEDURE:   The patient is a 29 year old female with a history of subglottic and tracheal stenosis. She is being brought to the Operating Room for resection of this cancer. Risks, benefits, alternatives, and rationale for the procedure(s) listed above were discussed with the patient, and the patient wishes to proceed with surgery and has signed an informed consent.     FINDINGS:   1. Flexible bronchoscopy for all procedure, no laryngoscopy  2. 90% stenosis initially with a thick long segment of stenosis at the cricoid level   3. Dilated to 12mmHg     DESCRIPTION OF PROCEDURE:   The patient was brought into the operating room and placed supine on the operating room table. A time-out was performed. General anesthesia was induced. The patient was easily mask ventilated. Then the patient was ventilated with a LMA.     The head of the bed was turned 90 degrees to the right. A flexible bronchoscope was placed through the LMA. 4% LTA was infused over the glottic entry. Once the topical anesthesia had been placed, the flexible bronchoscope was placed through the vocal cords. The patient had evidence of approximately 90% subglottic and tracheal narrowing. There was a scar bridge that was thick cone shaped at the level of the cricoid petering to 90% narrowing most distally.     The procedure began with first performing CO2 laser. The flexible waveguide CO2 laser was  threaded through the bronchoscope. A laser time-out was performed. The patient's face and eyes were protected with moist towels. The oxygen was subsequently reduced to less than 30%. Radial cuts were made within the scar band starting anteriorly through the scar bridge and then more cuts were made laterally and posteriorly. The patient was then allowed to obtain adequate saturation and the balloon dilator was then placed through the flexible bronchoscope. Dilations were performed to 12 mmHg. Once the subglottic area had been adequately dilated, the patient was once again allowed to obtain 100% saturation. Then 2.0 ml of kenalog 40 steroid  was injected into the scar using a sclerotherapy needle and the side channel of the bronch. Hemostasis was confirmed.     Then, a flexible esophagoscope was inserted through the oral cavity to the cricopharyngeus muscle and further to the body of the esophagus, then to the lower esophageal sphincter.  The stomach was visualized upon passing through the LES.  The findings were as follows: no lesions, no irregular z-line or shot segment Barretts, no hiatal hernia.   Esophagoscopy was performed one more time to ensure there was no complications such as a false tract.     This was the end of our procedure. Hemostasis was confirmed. Photodocumentation was performed. All scopes were removed and the patient was then handed back to the care of anesthesia who awoke and extubated the patient without complication.    COMPLICATIONS:   None.  .   ESTIMATED BLOOD LOSS:   Less than 10cc    DISPOSITION:   PACU.    SPECIMENS:  * No specimens in log *       PHOTODOCUMENTATION:

## 2019-11-05 NOTE — BRIEF OP NOTE
[facility], [place]    Brief Operative Note    Pre-operative diagnosis: Subglottic stenosis [J38.6]  Post-operative diagnosis Same as pre-operative diagnosis    Procedure: Procedure(s):  microdirect laryngoscopy, flexible bronchoscopy, flexible CO2 laser laryngoscopy with excision of stenosis, laryngoscopy with dilation, flexible esophagoscopy  INJECTION, STEROID  Surgeon: Surgeon(s) and Role:  Panel 1:     * Nadine Ayers MD - Primary     * Mauri Su MD - Resident - Assisting  Panel 2:     * Nadine Ayers MD - Primary  Anesthesia: General   Estimated blood loss: Minimal  Drains: None  Specimens: * No specimens in log *  Findings:   grade III subglottic stenosis.  Complications: None.  Implants: * No implants in log *

## 2019-11-05 NOTE — ANESTHESIA CARE TRANSFER NOTE
Patient: Alise Orona    Procedure(s):  microdirect laryngoscopy, flexible bronchoscopy, flexible CO2 laser laryngoscopy with excision of stenosis, laryngoscopy with dilation, flexible esophagoscopy  INJECTION, STEROID    Diagnosis: Subglottic stenosis [J38.6]  Diagnosis Additional Information: No value filed.    Anesthesia Type:   General     Note:  Airway :Nasal Cannula  Patient transferred to:PACU  Comments: Tolerated anesthesia well. C/o sore throat. Awake, VSSHandoff Report: Identifed the Patient, Identified the Reponsible Provider, Reviewed the pertinent medical history, Discussed the surgical course, Reviewed Intra-OP anesthesia mangement and issues during anesthesia, Set expectations for post-procedure period and Allowed opportunity for questions and acknowledgement of understanding      Vitals: (Last set prior to Anesthesia Care Transfer)    CRNA VITALS  11/5/2019 1147 - 11/5/2019 1223      11/5/2019             Resp Rate (observed):  11                Electronically Signed By: ITA Mcnulty CRNA  November 5, 2019  12:23 PM

## 2019-11-05 NOTE — ANESTHESIA POSTPROCEDURE EVALUATION
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       Anesthesia Type:  Not documented  General    Reportable Event: NO     PAIN: Uncomplicated   Sign Out status: Comfortable, Well controlled pain     PONV: No PONV   Sign Out status:  No Nausea or Vomiting     Neuro/Psych: Uneventful perioperative course   Sign Out Status: Preoperative baseline; Age appropriate mentation     Airway/Resp.: Uneventful perioperative course   Sign Out Status: Non labored breathing, age appropriate RR; Resp. Status within EXPECTED Parameters     CV: Uneventful perioperative course   Sign Out status: Appropriate BP and perfusion indices; Appropriate HR/Rhythm     Disposition:   Sign Out in:  PACU  Disposition:  Phase II; Home  Recovery Course: Uneventful  Follow-Up: Not required           Last Anesthesia Record Vitals:  CRNA VITALS  2019 1147 - 2019 1247      2019             Resp Rate (observed):  11          Last PACU Vitals:  Vitals Value Taken Time   /82 2019  1:15 PM   Temp 36.7  C (98  F) 2019 12:45 PM   Pulse 65 2019  1:10 PM   Resp 10 2019  1:15 PM   SpO2 98 % 2019  1:17 PM   Temp src     NIBP     Pulse     SpO2     Resp     Temp     Ht Rate     Temp 2     Vitals shown include unvalidated device data.      Electronically Signed By: Claribel Gonzalez MD, 2019, 1:18 PM

## 2019-11-13 NOTE — PROGRESS NOTES
[facility]       Patient: Alise Orona  MRN: [id number]    : 1990  Age/Gender: 29 year old female  Date of Service: 2019     Chief Complaint   Subglottic stenosis  S/p MDL with CO2 laser resection, dilation and steroid injection on 19    Interval History     HISTORY OF PRESENT ILLNESS: Ms. 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 Today she is doing well she reports her breathing is much improved since the surgery.  She denies any complaints.  She completed her antibiotics and is still taking the inhaler and the antireflux medication.  She is wondering if reflux is the cause for her narrowing she also has episodes of binging and she is wondering how this is affecting her stenosis.  Her voice is also still raspy since the surgery slightly improved but slightly different still.  She works at [employer] and has a lot of voice use.  She had a few rheumatology labs checked but had not been formally evaluated by rheumatology in the past.    Procedure history:   S/p MDL with CO2 laser resection, dilation and steroid injection on 19      PAST MEDICAL HISTORY: No past medical history on file.      PAST SURGICAL HISTORY:   Past Surgical History:   Procedure Laterality Date     GYN SURGERY      lap removal of ovarian cyst      INJECT STEROID (LOCATION) N/A 2019    Procedure: INJECTION, STEROID;  Surgeon: Nadine Ayers MD;  Location: [facility] OR     LASER CO2 LARYNGOSCOPY, COMPLEX N/A 2019    Procedure: microdirect laryngoscopy, flexible bronchoscopy, flexible CO2 laser laryngoscopy with excision of stenosis, laryngoscopy with dilation, flexible esophagoscopy;  Surgeon: Nadine Ayers MD;  Location: [facility] OR         CURRENT MEDICATIONS:   Current Outpatient Medications:      acetaminophen  (TYLENOL) 325 MG tablet, Take 2 tablets (650 mg) by mouth every 4 hours as needed for mild pain, Disp: 50 tablet, Rfl: 0     buPROPion (WELLBUTRIN XL) 300 MG 24 hr tablet, , Disp: , Rfl:      clonazePAM (KLONOPIN) 0.125 mg quarter-tab, Take 2 tablets by mouth 2 times daily, Disp: , Rfl: 1     escitalopram (LEXAPRO) 20 MG tablet, , Disp: , Rfl:      Escitalopram Oxalate (LEXAPRO PO), Take 20 mg by mouth daily, Disp: , Rfl:      fish oil-omega-3 fatty acids 1000 MG capsule, Take 2 g by mouth daily, Disp: , Rfl:      fluticasone (FLOVENT HFA) 110 MCG/ACT inhaler, Inhale 1 puff into the lungs 2 times daily, Disp: 1 Inhaler, Rfl: 1     magnesium gluconate (MAGONATE) 250 MG tablet, Take 400 mg by mouth daily, Disp: , Rfl:      multivitamin w/minerals (MULTI-VITAMIN) tablet, Take 1 tablet by mouth daily, Disp: , Rfl:      norgestimate-ethinyl estradiol (ORTHO-CYCLEN, SPRINTEC) 0.25-35 MG-MCG tablet, Take 1 tablet by mouth daily, Disp: , Rfl:      omeprazole (PRILOSEC) 40 MG DR capsule, Take 1 capsule (40 mg) by mouth daily Before breakfast, Disp: 60 capsule, Rfl: 0     ondansetron (ZOFRAN-ODT) 4 MG ODT tab, Take 1-2 tablets (4-8 mg) by mouth every 8 hours as needed for nausea, Disp: 4 tablet, Rfl: 0     sulfamethoxazole-trimethoprim (BACTRIM DS/SEPTRA DS) 800-160 MG tablet, Take 1 tablet by mouth 2 times daily, Disp: 20 tablet, Rfl: 0     traZODone (DESYREL) 50 MG tablet, Take 0.5 to 3 tabs 30-60 minutes prior to sleep as needed for insomnia, Disp: , Rfl:      TRAZODONE HCL PO, Take 50 mg by mouth At Bedtime, Disp: , Rfl:       ALLERGIES: Patient has no known allergies.      SOCIAL HISTORY:    Social History     Socioeconomic History     Marital status: Single     Spouse name: Not on file     Number of children: Not on file     Years of education: Not on file     Highest education level: Not on file   Occupational History     Not on file   Social Needs     Financial resource strain: Not on file     Food insecurity:      Worry: Not on file     Inability: Not on file     Transportation needs:     Medical: Not on file     Non-medical: Not on file   Tobacco Use     Smoking status: Never Smoker     Smokeless tobacco: Never Used   Substance and Sexual Activity     Alcohol use: Yes     Comment: occas     Drug use: Never     Sexual activity: Not on file   Lifestyle     Physical activity:     Days per week: Not on file     Minutes per session: Not on file     Stress: Not on file   Relationships     Social connections:     Talks on phone: Not on file     Gets together: Not on file     Attends Synagogue service: Not on file     Active member of club or organization: Not on file     Attends meetings of clubs or organizations: Not on file     Relationship status: Not on file     Intimate partner violence:     Fear of current or ex partner: Not on file     Emotionally abused: Not on file     Physically abused: Not on file     Forced sexual activity: Not on file   Other Topics Concern     Parent/sibling w/ CABG, MI or angioplasty before 65F 55M? Not Asked   Social History Narrative     Not on file           FAMILY HISTORY: Non-contributory for problems with anesthesia      REVIEW OF SYSTEMS:   The patient was asked a 14 point review of systems regarding constitutional symptoms, eye symptoms, ears, nose, mouth, throat symptoms, cardiovascular symptoms, respiratory symptoms, gastrointestinal symptoms, genitourinary symptoms, musculoskeletal symptoms, integumentary symptoms, neurological symptoms, psychiatric symptoms, endocrine symptoms, hematologic/lymphatic symptoms, and allergic/ immunologic symptoms.   The pertinent factors have been included in the HPI and below.  Patient Supplied Answers to Review of Systems  [facility] ROS 11/14/2019   Psychology Frequently feeling anxious   Ears, Nose, Throat Nasal congestion or drainage           Physical Examination     The patient underwent a physical examination as described below. The pertinent positive  and negative findings are summarized after the description of the examination.    Constitutional: The patient's developmental and nutritional status was assessed. The patient's voice quality was assessed.  Head and Face: The head and face were inspected for deformities. The sinuses were palpated. The salivary glands were palpated. Facial muscle strength was assessed bilaterally.  Eyes: Extraocular movements and primary gaze alignment were assessed.  Ears, Nose, Mouth and Throat: The ears and nose were examined for deformities. The nasal septum, mucosa, and turbinates were inspected by anterior rhinoscopy. The lips, teeth, and gums were examined for abnormalities. The oral mucosa, tongue, palate, tonsils, lateral and posterior pharynx were inspected for the presence of asymmetry or mucosal lesions.    Neck: The tracheal position was noted, and the neck mass palpated to determine if there were any asymmetries, abnormal neck masses, thyromegally, or thyroid nodules.  Respiratory: The nature of the breathing and chest expansion/symmetry was observed.  Cardiovascular: The patient was examined to determine the presence of any edema or jugular venous distension.  Abdomen: The contour of the abdomen was noted.  Lymphatic: The patient was examined for infraclavicular lymphadenopathy.  Musculoskeletal: The patient was inspected for the presence of skeletal deformities.  Extremities: The extremities were examined for any clubbing or cyanosis.  Skin: The skin was examined for inflammatory or neoplastic conditions.  Neurologic: The patient's orientation, mood, and affect were noted. The cranial nerve  functions were examined.    Other pertinent positive and negative findings on physical examination:   OC/OP: no lesions, uvula midline, soft palate elevates symmetrically, FOM/BOT soft  Neck: no lesions, no TH tenderness to palpation    All other physical examination findings were within normal limits and  noncontributory.    Procedures   Video Laryngoscopy with Stroboscopy (CPT 59599) and Behavioral & Qualitative Evaluation of Voice and Resonence     Preoperative Diagnosis:  Dysphonia and throat symptoms  Postoperative Diagnosis: Dysphonia and throat symptoms  Indication:  Patient has new or persistent dysphonia and throat symptoms.  This requires evaluation by stroboscopy to fully delineate the laryngeal functioning; especially mucosal wave assessment, ultrasharp visualization of lesions on the vocal folds, and overall functioning of the larynx.  Details of Procedure: A 70 degree rigid telescopic laryngoscope or a distal chip flexible scope was used. The lighting was obtained via a light cable connected to a stroboscopic unit. The telescope was inserted either transorally or transnasally until the vocal folds could be visualized. The patient was instructed to sustain the vowel  ee  at a comfortable pitch and loudness as the voice was monitored by a microphone connected to a fundamental frequency tracker. This circuit tracked vocal periodicity, allowing the light to flash in synchrony with the glottal cycles. A setting on the stroboscope was set to change the phase of light strobing with relation to the vocal fundamental frequency, producing an image of 1 to 2 glottal cycles every second. The video images were recorded for analysis. Use of the variable speed, slow and stop scan allowed careful study of pertinent segments of laryngeal function to increase accuracy of clinical assessments of function and dysfunction.  In particular, features of glottal closure, mucosal wave on the vocal fold cover and laryngeal symmetry were analyzed. Lastly, the patient was asked to phonate speech samples and auditory/perceptual evaluation of voice and resonance were performed.  The vocal quality was carefully evaluated for hoarseness, breathiness, loudness, phrase length and intelligibility to determine the source of  dysphonia.    Findings:   A. BEHAVIORAL & QUALITATIVE EVALUATION OF VOICE AND RESONENCE   Comments: F0 255 MPT: 10sec  Vocal Quality: mildly raspy and strained    Pitch Range:  Mildly reduced   Phrase Length:  Normal  Vocal Loudness: Normal  Dysarthria: No    B. LARYNGOVIDEOSTROBOSCOPY   Anatomic/Physiological Deviations:  Mild vocal fold thickening, bilateral vocal process granuloma, L>R  Very mild subglottic narrowing  Supraglottic hyperfunction  Mucosal wave: Right:  No restriction     Left: No restriction  Bilateral Vocal Fold Vibration: Asymmetric  Vocal Process: Right: No restriction    Left:  No restriction  Vocal Fold closure: Complete glottal closure    Complication(s): None  Disposition: Patient tolerated the procedure well                Review of Relevant Clinical Data     Labs:  No results found for: TSH  No results found for: NA, CO2, BUN, CREAT, GLUCOSE, PHOS  No results found for: WBC, HGB, HCT, MCV, PLT  No results found for: TERI  No components found for: RHEUMATOIDFACTOR,  RF  No results found for: CRP  No components found for: CKTOT, URICACID  No components found for: C3, C4, DSDNAAB, NDNAABIFA  No results found for: MPOAB    Patient reported Quality of Life (QOL) Measures     Voice Handicap Index - 10  0 = never; 1 = almost never; 2 = sometimes; 3 = almost always; 4 = always  My voice makes it difficult for people to hear me 0   People have difficulty understanding me in a noisy room 0   My voice difficulties restrict personal and social life 0   I feel left out of conversations because of my voice 0   My voice problem causes me to lose income 0   I feel as though I have to strain to produce voice 0   The clarity of my voice is unpredictable 0   My voice problem upsets me 0   My voice makes me feel handicapped 0   People ask  What s wrong with your voice?  0         Total: 0      Eating Assessment Tool - 10  To what extent are the following scenarios problematic for the patient?  0 = No Problem;  4= Severe Problem   My swallowing problem has caused me to lose weight 0   My swallowing problem interferes with my ability to go out for meals  0   Swallowing liquids takes extra effort 0   Swallowing solids takes extra effort 0   Swallowing pills takes extra effort 0   Swallowing is painful 0   The pleasure of eating is affected by my swallowing 0   When I swallow food sticks in my throat 1   I cough when I eat 0   Swallowing is stressful 0         Total: 1    Reflux Symptom Index  Within the last month, how did the following problems affect the patient?  0 = no problem; 5= severe problem  Hoarseness or a problem with your voice 0   Clearing your throat  2   Excess throat mucous or postnasal drip 1   Difficulty swallowing food, liquid or pills 0   Coughing after you ate or after lying down  0   Breathing difficulties or choking episodes 0   Troublesome or annoying cough 0   Sensations of something sticking in your throat or a lump in your throat  0   Heartburn, chest pain, indigestion, or stomach acid coming up 0         Total: 3     Impression & Plan     IMPRESSION: Ms. Orona is a 29 year old female who is being seen for the followin.  Subglottic stenosis  -No stridor today  -Open airway with only 10% narrowing  -Mild mucus on the right-hand side  -We will start another course of antibiotics  -Continue Flovent inhaler and reflux precautions  -Discussed minimizing other irritants like inhaled agents also discussed that overeating can create reflux  -And thus more antireflux meds should be considered if this should happen  -Return in 1 month and will reevaluate blood us in the point and consider steroid injection  -Some testing at psych provider for rheumatologic cause of her glottic stenosis, will refer to rheumatology for a full work-up    2. LPR  -had EGD in the OR which did not show any reflux changes no hiatal hernia however this is a static procedure and will obtain a dynamic esophagram to rule out  reflux and esophageal motility    3.  Dysphonia  -Mild vocal fold edema still resolving from the surgery as well as new onset granuloma  -Also has evidence of muscle tension dysphonia  -She reports that her voice is different, she has a lot more airflow than she had before and is taking her a while to reaccommodate  -She has a vocally demanding job and therefore will proceed with a few sessions of voice therapy    RETURN VISIT: follow up 1 month, or earlier as needed.     Thank you for the kind referral and for allowing me to share in the care of Ms. Orona. If you have any questions, please do not hesitate to contact me.        Nadine Hoffmann MD    of Otolaryngology - Head and Neck Surgery   Voice, Airway, and Swallowing Disorders   [facility] at the [facility]    Clinics & Surgery [address] [address]  Phone: [phone]  Fax: [phone]    [address] [address]  Phone: [phone]  Fax: [phone]     CC: Ernestina Oleary

## 2019-11-14 ENCOUNTER — OFFICE VISIT (OUTPATIENT)
Dept: OTOLARYNGOLOGY | Facility: CLINIC | Age: 29
End: 2019-11-14
Payer: COMMERCIAL

## 2019-11-14 VITALS — HEIGHT: 65 IN | BODY MASS INDEX: 42.32 KG/M2 | WEIGHT: 254 LBS

## 2019-11-14 DIAGNOSIS — R49.0 DYSPHONIA: Primary | ICD-10-CM

## 2019-11-14 DIAGNOSIS — J38.6 SUBGLOTTIC STENOSIS: Primary | ICD-10-CM

## 2019-11-14 DIAGNOSIS — K21.9 LPRD (LARYNGOPHARYNGEAL REFLUX DISEASE): ICD-10-CM

## 2019-11-14 DIAGNOSIS — J38.3 VOCAL PROCESS GRANULOMA: ICD-10-CM

## 2019-11-14 RX ORDER — SULFAMETHOXAZOLE/TRIMETHOPRIM 800-160 MG
1 TABLET ORAL 2 TIMES DAILY
Qty: 20 TABLET | Refills: 0 | Status: SHIPPED | OUTPATIENT
Start: 2019-11-14 | End: 2020-01-02

## 2019-11-14 RX ORDER — FLUTICASONE PROPIONATE 110 UG/1
1 AEROSOL, METERED RESPIRATORY (INHALATION) 2 TIMES DAILY
Qty: 1 INHALER | Refills: 1 | Status: CANCELLED | OUTPATIENT
Start: 2019-11-14

## 2019-11-14 ASSESSMENT — MIFFLIN-ST. JEOR: SCORE: 1873.02

## 2019-11-14 ASSESSMENT — PAIN SCALES - GENERAL: PAINLEVEL: NO PAIN (0)

## 2019-11-14 NOTE — PROGRESS NOTES
[facility]    Clinician: SARAH Lund M.A. (music), ADEBAYOY-SLP  Seen in conjunction with: Dr. Hoffmann  Patient: Alise Orona  Date of Visit: 11/14/2019    HISTORY  PATIENT INFORMATION  Alise Orona was seen for brief consultation today.  Please refer to Dr. Hoffmann s dictation for a more complete history and impressions.     Ms. Orona recently underwent surgery for subglottic stenosis with Dr. Hoffmann on 11/05/2019. Laryngeal examination today revealed bilateral vocal fold swelling. Perceptually the voice sounds hoarse. She works at [employer] and has a high vocal demand.     We have agreed that she would benefit from a course of speech therapy, and she will schedule this as soon as possible.  At that time, the evaluation will be completed, and a full evaluation report will follow.    DIAGNOSIS/REASON FOR REFERRAL  Dysphonia/ Evaluate, perform laryngeal exam, treat as appropriate    No charge for today s session; charges will be billed at the completion of the evaluation  NO CHARGE FACILITY FEE (79391)    ICD-10 code (s): R49.0 (Dysphonia)     SARAH Lund M.A. (music), CFLUCRECIA-SLP  Speech Language Pathology Clinical Fellow  [facility] Trained Vocologist   [facility]   [phone]  [email or web address]

## 2019-11-14 NOTE — LETTER
11/14/2019       RE: Alise Orona  [address]  [place] [address]     Dear Colleague,    Thank you for referring your patient, Alise Orona, to the [facility]. Please see a copy of my visit note below.    [facility]    Clinician: SARAH Lund M.A. (music), DAIANA-SLP  Seen in conjunction with: Dr. Hoffmann  Patient: Alise Orona  Date of Visit: 11/14/2019    HISTORY  PATIENT INFORMATION  Alise Orona was seen for brief consultation today.  Please refer to Dr. Hoffmann s dictation for a more complete history and impressions.     Ms. Orona recently underwent surgery for subglottic stenosis with Dr. Hoffmann on 11/05/2019. Laryngeal examination today revealed bilateral vocal fold swelling. Perceptually the voice sounds hoarse. She works at [employer] and has a high vocal demand.     We have agreed that she would benefit from a course of speech therapy, and she will schedule this as soon as possible.  At that time, the evaluation will be completed, and a full evaluation report will follow.    DIAGNOSIS/REASON FOR REFERRAL  Dysphonia/ Evaluate, perform laryngeal exam, treat as appropriate    No charge for today s session; charges will be billed at the completion of the evaluation  NO CHARGE FACILITY FEE (83702)    ICD-10 code (s): R49.0 (Dysphonia)     SARAH Lund M.A. (music), DAIANA-SLP  Speech Language Pathology Clinical Fellow  [facility] Trained Vocologist   [facility]   [phone]  [email or web address]                Again, thank you for allowing me to participate in the care of your patient.      Sincerely,    MARK Lund

## 2019-11-14 NOTE — NURSING NOTE
"Chief Complaint   Patient presents with     Post-op Visit     1 week follow up     Height 1.651 m (5' 5\"), weight 115.2 kg (254 lb).    Terra Avila, EMT  "

## 2019-11-14 NOTE — PATIENT INSTRUCTIONS
You were seen in the ENT Clinic today by Dr. Hoffmann  If you have any questions or concerns after your appointment, please call   -  ENT Clinic: [phone] scheduling option 1 and nurse advice option 3.    -  Recommendations: Continue on Reflux mediations and precautions. Take Flovent for another month. Follow up with Speech and Rheumatology . Complete esophogram   -  Please follow up with Dr. Hoffmann in approximately 1 month         Thank you for choosing [facility] and allowing us to be apart of your care team!  Louann Young LPN

## 2019-11-14 NOTE — LETTER
2019       RE: Alise Orona  [address]  [place] [address]     Dear Colleague,    Thank you for referring your patient, Alise Orona, to the [facility] EAR NOSE AND THROAT at [facility]. Please see a copy of my visit note below.      [facility]       Patient: Alise Orona  MRN: [id number]    : 1990  Age/Gender: 29 year old female  Date of Service: 2019     Chief Complaint   Subglottic stenosis  S/p MDL with CO2 laser resection, dilation and steroid injection on 19    Interval History     HISTORY OF PRESENT ILLNESS: Ms. 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 Today she is doing well she reports her breathing is much improved since the surgery.  She denies any complaints.  She completed her antibiotics and is still taking the inhaler and the antireflux medication.  She is wondering if reflux is the cause for her narrowing she also has episodes of binging and she is wondering how this is affecting her stenosis.  Her voice is also still raspy since the surgery slightly improved but slightly different still.  She works at [employer] and has a lot of voice use.  She had a few rheumatology labs checked but had not been formally evaluated by rheumatology in the past.    Procedure history:   S/p MDL with CO2 laser resection, dilation and steroid injection on 19      PAST MEDICAL HISTORY: No past medical history on file.      PAST SURGICAL HISTORY:   Past Surgical History:   Procedure Laterality Date     GYN SURGERY      lap removal of ovarian cyst      INJECT STEROID (LOCATION) N/A 2019    Procedure: INJECTION, STEROID;  Surgeon: Nadine Ayers MD;  Location: [facility] OR     LASER CO2 LARYNGOSCOPY, COMPLEX N/A 2019    Procedure:  microdirect laryngoscopy, flexible bronchoscopy, flexible CO2 laser laryngoscopy with excision of stenosis, laryngoscopy with dilation, flexible esophagoscopy;  Surgeon: Nadine Ayers MD;  Location:  OR         CURRENT MEDICATIONS:   Current Outpatient Medications:      acetaminophen (TYLENOL) 325 MG tablet, Take 2 tablets (650 mg) by mouth every 4 hours as needed for mild pain, Disp: 50 tablet, Rfl: 0     buPROPion (WELLBUTRIN XL) 300 MG 24 hr tablet, , Disp: , Rfl:      clonazePAM (KLONOPIN) 0.125 mg quarter-tab, Take 2 tablets by mouth 2 times daily, Disp: , Rfl: 1     escitalopram (LEXAPRO) 20 MG tablet, , Disp: , Rfl:      Escitalopram Oxalate (LEXAPRO PO), Take 20 mg by mouth daily, Disp: , Rfl:      fish oil-omega-3 fatty acids 1000 MG capsule, Take 2 g by mouth daily, Disp: , Rfl:      fluticasone (FLOVENT HFA) 110 MCG/ACT inhaler, Inhale 1 puff into the lungs 2 times daily, Disp: 1 Inhaler, Rfl: 1     magnesium gluconate (MAGONATE) 250 MG tablet, Take 400 mg by mouth daily, Disp: , Rfl:      multivitamin w/minerals (MULTI-VITAMIN) tablet, Take 1 tablet by mouth daily, Disp: , Rfl:      norgestimate-ethinyl estradiol (ORTHO-CYCLEN, SPRINTEC) 0.25-35 MG-MCG tablet, Take 1 tablet by mouth daily, Disp: , Rfl:      omeprazole (PRILOSEC) 40 MG DR capsule, Take 1 capsule (40 mg) by mouth daily Before breakfast, Disp: 60 capsule, Rfl: 0     ondansetron (ZOFRAN-ODT) 4 MG ODT tab, Take 1-2 tablets (4-8 mg) by mouth every 8 hours as needed for nausea, Disp: 4 tablet, Rfl: 0     sulfamethoxazole-trimethoprim (BACTRIM DS/SEPTRA DS) 800-160 MG tablet, Take 1 tablet by mouth 2 times daily, Disp: 20 tablet, Rfl: 0     traZODone (DESYREL) 50 MG tablet, Take 0.5 to 3 tabs 30-60 minutes prior to sleep as needed for insomnia, Disp: , Rfl:      TRAZODONE HCL PO, Take 50 mg by mouth At Bedtime, Disp: , Rfl:       ALLERGIES: Patient has no known allergies.      SOCIAL HISTORY:    Social History     Socioeconomic History      Marital status: Single     Spouse name: Not on file     Number of children: Not on file     Years of education: Not on file     Highest education level: Not on file   Occupational History     Not on file   Social Needs     Financial resource strain: Not on file     Food insecurity:     Worry: Not on file     Inability: Not on file     Transportation needs:     Medical: Not on file     Non-medical: Not on file   Tobacco Use     Smoking status: Never Smoker     Smokeless tobacco: Never Used   Substance and Sexual Activity     Alcohol use: Yes     Comment: occas     Drug use: Never     Sexual activity: Not on file   Lifestyle     Physical activity:     Days per week: Not on file     Minutes per session: Not on file     Stress: Not on file   Relationships     Social connections:     Talks on phone: Not on file     Gets together: Not on file     Attends [employer] service: Not on file     Active member of club or organization: Not on file     Attends meetings of clubs or organizations: Not on file     Relationship status: Not on file     Intimate partner violence:     Fear of current or ex partner: Not on file     Emotionally abused: Not on file     Physically abused: Not on file     Forced sexual activity: Not on file   Other Topics Concern     Parent/sibling w/ CABG, MI or angioplasty before 65F 55M? Not Asked   Social History Narrative     Not on file           FAMILY HISTORY: Non-contributory for problems with anesthesia      REVIEW OF SYSTEMS:   The patient was asked a 14 point review of systems regarding constitutional symptoms, eye symptoms, ears, nose, mouth, throat symptoms, cardiovascular symptoms, respiratory symptoms, gastrointestinal symptoms, genitourinary symptoms, musculoskeletal symptoms, integumentary symptoms, neurological symptoms, psychiatric symptoms, endocrine symptoms, hematologic/lymphatic symptoms, and allergic/ immunologic symptoms.   The pertinent factors have been included in the HPI and  below.  Patient Supplied Answers to Review of Systems   ENT ROS 11/14/2019   Psychology Frequently feeling anxious   Ears, Nose, Throat Nasal congestion or drainage           Physical Examination     The patient underwent a physical examination as described below. The pertinent positive and negative findings are summarized after the description of the examination.    Constitutional: The patient's developmental and nutritional status was assessed. The patient's voice quality was assessed.  Head and Face: The head and face were inspected for deformities. The sinuses were palpated. The salivary glands were palpated. Facial muscle strength was assessed bilaterally.  Eyes: Extraocular movements and primary gaze alignment were assessed.  Ears, Nose, Mouth and Throat: The ears and nose were examined for deformities. The nasal septum, mucosa, and turbinates were inspected by anterior rhinoscopy. The lips, teeth, and gums were examined for abnormalities. The oral mucosa, tongue, palate, tonsils, lateral and posterior pharynx were inspected for the presence of asymmetry or mucosal lesions.    Neck: The tracheal position was noted, and the neck mass palpated to determine if there were any asymmetries, abnormal neck masses, thyromegally, or thyroid nodules.  Respiratory: The nature of the breathing and chest expansion/symmetry was observed.  Cardiovascular: The patient was examined to determine the presence of any edema or jugular venous distension.  Abdomen: The contour of the abdomen was noted.  Lymphatic: The patient was examined for infraclavicular lymphadenopathy.  Musculoskeletal: The patient was inspected for the presence of skeletal deformities.  Extremities: The extremities were examined for any clubbing or cyanosis.  Skin: The skin was examined for inflammatory or neoplastic conditions.  Neurologic: The patient's orientation, mood, and affect were noted. The cranial nerve  functions were examined.    Other pertinent  positive and negative findings on physical examination:   OC/OP: no lesions, uvula midline, soft palate elevates symmetrically, FOM/BOT soft  Neck: no lesions, no TH tenderness to palpation    All other physical examination findings were within normal limits and noncontributory.    Procedures   Video Laryngoscopy with Stroboscopy (CPT 07883) and Behavioral & Qualitative Evaluation of Voice and Resonence     Preoperative Diagnosis:  Dysphonia and throat symptoms  Postoperative Diagnosis: Dysphonia and throat symptoms  Indication:  Patient has new or persistent dysphonia and throat symptoms.  This requires evaluation by stroboscopy to fully delineate the laryngeal functioning; especially mucosal wave assessment, ultrasharp visualization of lesions on the vocal folds, and overall functioning of the larynx.  Details of Procedure: A 70 degree rigid telescopic laryngoscope or a distal chip flexible scope was used. The lighting was obtained via a light cable connected to a stroboscopic unit. The telescope was inserted either transorally or transnasally until the vocal folds could be visualized. The patient was instructed to sustain the vowel  ee  at a comfortable pitch and loudness as the voice was monitored by a microphone connected to a fundamental frequency tracker. This circuit tracked vocal periodicity, allowing the light to flash in synchrony with the glottal cycles. A setting on the stroboscope was set to change the phase of light strobing with relation to the vocal fundamental frequency, producing an image of 1 to 2 glottal cycles every second. The video images were recorded for analysis. Use of the variable speed, slow and stop scan allowed careful study of pertinent segments of laryngeal function to increase accuracy of clinical assessments of function and dysfunction.  In particular, features of glottal closure, mucosal wave on the vocal fold cover and laryngeal symmetry were analyzed. Lastly, the patient was  asked to phonate speech samples and auditory/perceptual evaluation of voice and resonance were performed.  The vocal quality was carefully evaluated for hoarseness, breathiness, loudness, phrase length and intelligibility to determine the source of dysphonia.    Findings:   A. BEHAVIORAL & QUALITATIVE EVALUATION OF VOICE AND RESONENCE   Comments: F0 255 MPT: 10sec  Vocal Quality: mildly raspy and strained    Pitch Range:  Mildly reduced   Phrase Length:  Normal  Vocal Loudness: Normal  Dysarthria: No    B. LARYNGOVIDEOSTROBOSCOPY   Anatomic/Physiological Deviations:  Mild vocal fold thickening, bilateral vocal process granuloma, L>R  Very mild subglottic narrowing  Supraglottic hyperfunction  Mucosal wave: Right:  No restriction     Left: No restriction  Bilateral Vocal Fold Vibration: Asymmetric  Vocal Process: Right: No restriction    Left:  No restriction  Vocal Fold closure: Complete glottal closure    Complication(s): None  Disposition: Patient tolerated the procedure well                Review of Relevant Clinical Data     Labs:  No results found for: TSH  No results found for: NA, CO2, BUN, CREAT, GLUCOSE, PHOS  No results found for: WBC, HGB, HCT, MCV, PLT  No results found for: TERI  No components found for: RHEUMATOIDFACTOR,  RF  No results found for: CRP  No components found for: CKTOT, URICACID  No components found for: C3, C4, DSDNAAB, NDNAABIFA  No results found for: MPOAB    Patient reported Quality of Life (QOL) Measures     Voice Handicap Index - 10  0 = never; 1 = almost never; 2 = sometimes; 3 = almost always; 4 = always  My voice makes it difficult for people to hear me 0   People have difficulty understanding me in a noisy room 0   My voice difficulties restrict personal and social life 0   I feel left out of conversations because of my voice 0   My voice problem causes me to lose income 0   I feel as though I have to strain to produce voice 0   The clarity of my voice is unpredictable 0   My  voice problem upsets me 0   My voice makes me feel handicapped 0   People ask  What s wrong with your voice?  0         Total: 0      Eating Assessment Tool - 10  To what extent are the following scenarios problematic for the patient?  0 = No Problem; 4= Severe Problem   My swallowing problem has caused me to lose weight 0   My swallowing problem interferes with my ability to go out for meals  0   Swallowing liquids takes extra effort 0   Swallowing solids takes extra effort 0   Swallowing pills takes extra effort 0   Swallowing is painful 0   The pleasure of eating is affected by my swallowing 0   When I swallow food sticks in my throat 1   I cough when I eat 0   Swallowing is stressful 0         Total: 1    Reflux Symptom Index  Within the last month, how did the following problems affect the patient?  0 = no problem; 5= severe problem  Hoarseness or a problem with your voice 0   Clearing your throat  2   Excess throat mucous or postnasal drip 1   Difficulty swallowing food, liquid or pills 0   Coughing after you ate or after lying down  0   Breathing difficulties or choking episodes 0   Troublesome or annoying cough 0   Sensations of something sticking in your throat or a lump in your throat  0   Heartburn, chest pain, indigestion, or stomach acid coming up 0         Total: 3     Impression & Plan     IMPRESSION: Ms. Orona is a 29 year old female who is being seen for the followin.  Subglottic stenosis  -No stridor today  -Open airway with only 10% narrowing  -Mild mucus on the right-hand side  -We will start another course of antibiotics  -Continue Flovent inhaler and reflux precautions  -Discussed minimizing other irritants like inhaled agents also discussed that overeating can create reflux  -And thus more antireflux meds should be considered if this should happen  -Return in 1 month and will reevaluate blood us in the point and consider steroid injection  -Some testing at psych provider for  rheumatologic cause of her glottic stenosis, will refer to rheumatology for a full work-up    2. LPR  -had EGD in the OR which did not show any reflux changes no hiatal hernia however this is a static procedure and will obtain a dynamic esophagram to rule out reflux and esophageal motility    3.  Dysphonia  -Mild vocal fold edema still resolving from the surgery as well as new onset granuloma  -Also has evidence of muscle tension dysphonia  -She reports that her voice is different, she has a lot more airflow than she had before and is taking her a while to reaccommodate  -She has a vocally demanding job and therefore will proceed with a few sessions of voice therapy    RETURN VISIT: follow up 1 month, or earlier as needed.     Thank you for the kind referral and for allowing me to share in the care of Ms. Orona. If you have any questions, please do not hesitate to contact me.        Nadine Hoffmann MD    of Otolaryngology - Head and Neck Surgery   Voice, Airway, and Swallowing Disorders   [facility] at the [facility]    Clinics & Surgery [address] [address]  Phone: [phone]  Fax: [phone]    [place] [address]  Phone: [phone]  Fax: [phone]     CC: Ernestina Oleary

## 2019-12-11 ENCOUNTER — TELEPHONE (OUTPATIENT)
Dept: RHEUMATOLOGY | Facility: CLINIC | Age: 29
End: 2019-12-11

## 2019-12-12 ENCOUNTER — TELEPHONE (OUTPATIENT)
Dept: OTOLARYNGOLOGY | Facility: CLINIC | Age: 29
End: 2019-12-12

## 2019-12-12 DIAGNOSIS — J38.6 SUBGLOTTIC STENOSIS: Primary | ICD-10-CM

## 2019-12-12 DIAGNOSIS — K21.9 LPRD (LARYNGOPHARYNGEAL REFLUX DISEASE): ICD-10-CM
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
## 2019-12-16 ENCOUNTER — TELEPHONE (OUTPATIENT)
Dept: OTOLARYNGOLOGY | Facility: CLINIC | Age: 29
End: 2019-12-16

## 2019-12-16 NOTE — TELEPHONE ENCOUNTER
Called and spoke with patient on 12/12  No stridor reported nor heard on exam  Ok to follow up in 1 month

## 2019-12-17 NOTE — TELEPHONE ENCOUNTER
Attempted to contact patient again to schedule XRAY Esophogram. Left a second voicemail with the imaging call center number for pt to call back and schedule imaging appointment.

## 2019-12-18 NOTE — TELEPHONE ENCOUNTER
Per reviewing Rheumatologist, ok to schedule patient in the fellows clinic. Task sent to schedulers to connect with patient and offer an appointment.   Symone Mims CMA   12/18/2019 9:28 AM
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
## 2020-01-02 ENCOUNTER — OFFICE VISIT (OUTPATIENT)
Dept: OTOLARYNGOLOGY | Facility: CLINIC | Age: 30
End: 2020-01-02
Payer: COMMERCIAL

## 2020-01-02 VITALS — HEIGHT: 65 IN | BODY MASS INDEX: 44.32 KG/M2 | WEIGHT: 266 LBS

## 2020-01-02 DIAGNOSIS — K21.9 LPRD (LARYNGOPHARYNGEAL REFLUX DISEASE): ICD-10-CM

## 2020-01-02 DIAGNOSIS — J38.6 SUBGLOTTIC STENOSIS: ICD-10-CM

## 2020-01-02 DIAGNOSIS — R49.0 DYSPHONIA: Primary | ICD-10-CM

## 2020-01-02 RX ORDER — SULFAMETHOXAZOLE/TRIMETHOPRIM 800-160 MG
1 TABLET ORAL 2 TIMES DAILY
Qty: 20 TABLET | Refills: 0 | Status: SHIPPED | OUTPATIENT
Start: 2020-01-02 | End: 2021-12-21

## 2020-01-02 ASSESSMENT — PAIN SCALES - GENERAL: PAINLEVEL: NO PAIN (0)

## 2020-01-02 ASSESSMENT — MIFFLIN-ST. JEOR: SCORE: 1927.45

## 2020-01-02 NOTE — PATIENT INSTRUCTIONS
You were seen in the ENT Clinic today by Dr. Hoffmann  If you have any questions or concerns after your appointment, please call   -  ENT Clinic: [phone] scheduling option 1 and nurse advice option 3.    -  Recommendations: Use humidifier at home, take medications as prescribed. Continue to do voice therapy. Continue to follow reflux precautions. Complete esophogram test we will call with results. Continue to use inhaler as prescribed.   Activate my-chart account   -  Please follow up with Dr. Hoffmann in approximately 3 months          Thank you for choosing [facility] and allowing us to be apart of your care team!  Louann Young LPN

## 2020-01-02 NOTE — NURSING NOTE
"Chief Complaint   Patient presents with     Post-op Visit     5 weeks PO     Height 1.651 m (5' 5\"), weight 120.7 kg (266 lb).    Terra Avila, EMT  "

## 2020-01-21 ENCOUNTER — ANCILLARY PROCEDURE (OUTPATIENT)
Dept: GENERAL RADIOLOGY | Facility: CLINIC | Age: 30
End: 2020-01-21
Attending: OTOLARYNGOLOGY
Payer: COMMERCIAL

## 2020-01-21 DIAGNOSIS — K21.9 LPRD (LARYNGOPHARYNGEAL REFLUX DISEASE): ICD-10-CM

## 2020-01-27 DIAGNOSIS — J38.6 SUBGLOTTIC STENOSIS: ICD-10-CM

## 2020-01-27 DIAGNOSIS — K21.9 LPRD (LARYNGOPHARYNGEAL REFLUX DISEASE): ICD-10-CM

## 2020-01-27 DIAGNOSIS — J38.3 VOCAL PROCESS GRANULOMA: ICD-10-CM

## 2020-01-27 RX ORDER — OMEPRAZOLE 40 MG/1
40 CAPSULE, DELAYED RELEASE ORAL
Qty: 90 CAPSULE | Refills: 3 | Status: SHIPPED | OUTPATIENT
Start: 2020-01-27

## 2020-01-27 NOTE — TELEPHONE ENCOUNTER
"1/2/2020  [facility]   Nadine Hoffmann MD   Otolaryngology   [place] 4/2/20    -\" Continue omeprazole\"   "

## 2020-03-10 ENCOUNTER — HEALTH MAINTENANCE LETTER (OUTPATIENT)
Age: 30
End: 2020-03-10

## 2020-03-16 ENCOUNTER — MYC MEDICAL ADVICE (OUTPATIENT)
Dept: OTOLARYNGOLOGY | Facility: CLINIC | Age: 30
End: 2020-03-16

## 2020-03-20 ENCOUNTER — VIRTUAL VISIT (OUTPATIENT)
Dept: OTOLARYNGOLOGY | Facility: CLINIC | Age: 30
End: 2020-03-20
Payer: COMMERCIAL

## 2020-03-20 ENCOUNTER — DOCUMENTATION ONLY (OUTPATIENT)
Dept: OTOLARYNGOLOGY | Facility: CLINIC | Age: 30
End: 2020-03-20

## 2020-03-20 DIAGNOSIS — J38.6 SUBGLOTTIC STENOSIS: Primary | ICD-10-CM

## 2020-03-20 RX ORDER — SULFAMETHOXAZOLE/TRIMETHOPRIM 800-160 MG
1 TABLET ORAL 2 TIMES DAILY
Qty: 20 TABLET | Refills: 0 | Status: SHIPPED | OUTPATIENT
Start: 2020-03-20

## 2020-03-20 NOTE — PROGRESS NOTES
"Alise Orona is a 29 year old female who is being evaluated via a billable telephone visit.      The patient has been notified of following:     \"This telephone visit will be conducted via a call between you and your physician/provider. We have found that certain health care needs can be provided without the need for a physical exam.  This service lets us provide the care you need with a short phone conversation.  If a prescription is necessary we can send it directly to your pharmacy.  If lab work is needed we can place an order for that and you can then stop by our lab to have the test done at a later time.    If during the course of the call the physician/provider feels a telephone visit is not appropriate, you will not be charged for this service.\"     Alise Orona complains of  No chief complaint on file.      I have reviewed and updated the patient's Past Medical History, Social History, Family History and Medication List.    ALLERGIES  Patient has no known allergies.    Additional provider notes: The patient was initially seen on 11/1/19 and diagnosed with subglottic stenosis for which she underwent MDL with CO2 laser resection, dilation and kenalog injection on 11/5/19. She was last seen on 1/2/20. During that office visit she was doing well, no stridor and patent subglottis. She complaint of some mucus and bactrim was prescribed.    She states she still has some lingering mucus. She is working at [employer] [place]. She is not allowed to wear a mask. She denies breathing problems at this time.     On exam today she has no stridor at rest nor with deep inspiration. Speaking in full sentences. Voice strong with good range.     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 Concern about mucus at this point. Will start salt water gargles and sinus rinse. Sent bactrim to " pharmacy for future use if needed. Avoid NSAIDs and steroids. Also I recommend strict respiratory precautions. Letter written for work.    Phone call duration: 12 minutes      Nadine Hoffmann MD    of Otolaryngology - Head and Neck Surgery   Voice, Airway, and Swallowing Disorders   [facility] Voice Clinic at the [facility]     Clinics & Surgery [address] [address]  Phone: [phone]  Fax: [phone]     [place] [id number]  Phone: [phone]  Fax: [phone]

## 2020-12-27 ENCOUNTER — HEALTH MAINTENANCE LETTER (OUTPATIENT)
Age: 30
End: 2020-12-27

## 2021-04-24 ENCOUNTER — HEALTH MAINTENANCE LETTER (OUTPATIENT)
Age: 31
End: 2021-04-24

## 2021-10-03 ENCOUNTER — HEALTH MAINTENANCE LETTER (OUTPATIENT)
Age: 31
End: 2021-10-03

## 2021-11-19 ENCOUNTER — APPOINTMENT (OUTPATIENT)
Dept: URGENT CARE | Facility: CLINIC | Age: 31
End: 2021-11-19
Payer: COMMERCIAL

## 2021-12-01 ENCOUNTER — TELEPHONE (OUTPATIENT)
Dept: OTOLARYNGOLOGY | Facility: CLINIC | Age: 31
End: 2021-12-01
Payer: COMMERCIAL

## 2021-12-01 NOTE — TELEPHONE ENCOUNTER
attempted to contact patient to offer an appointment with Dr. Hoffmann 12/2/21 8:00 AM or 1:00 PM.  was unable to leave a voicemail for patient due to full mailbox.    Diego Trujillo, EMT

## 2021-12-02 ENCOUNTER — OFFICE VISIT (OUTPATIENT)
Dept: OTOLARYNGOLOGY | Facility: CLINIC | Age: 31
End: 2021-12-02
Payer: COMMERCIAL

## 2021-12-02 VITALS — HEIGHT: 65 IN | BODY MASS INDEX: 44.26 KG/M2 | HEART RATE: 81 BPM | OXYGEN SATURATION: 97 %

## 2021-12-02 DIAGNOSIS — R49.0 DYSPHONIA: Primary | ICD-10-CM

## 2021-12-02 DIAGNOSIS — E66.01 MORBID OBESITY (H): ICD-10-CM

## 2021-12-02 DIAGNOSIS — J38.6 SUBGLOTTIC STENOSIS: ICD-10-CM

## 2021-12-02 PROCEDURE — 31575 DIAGNOSTIC LARYNGOSCOPY: CPT | Performed by: OTOLARYNGOLOGY

## 2021-12-02 PROCEDURE — 99215 OFFICE O/P EST HI 40 MIN: CPT | Mod: 25 | Performed by: OTOLARYNGOLOGY

## 2021-12-02 RX ORDER — LISDEXAMFETAMINE DIMESYLATE 10 MG/1
CAPSULE ORAL
COMMUNITY
Start: 2021-11-30

## 2021-12-02 RX ORDER — CLONAZEPAM 1 MG/1
1 TABLET ORAL
COMMUNITY
Start: 2021-09-14

## 2021-12-02 RX ORDER — CLONAZEPAM 0.5 MG/1
TABLET ORAL
COMMUNITY
Start: 2021-11-30

## 2021-12-02 RX ORDER — BUSPIRONE HYDROCHLORIDE 10 MG/1
TABLET ORAL 2 TIMES DAILY
COMMUNITY
Start: 2021-11-30

## 2021-12-02 RX ORDER — BUSPIRONE HYDROCHLORIDE 5 MG/1
TABLET ORAL
COMMUNITY
Start: 2020-10-29

## 2021-12-02 ASSESSMENT — PAIN SCALES - GENERAL: PAINLEVEL: NO PAIN (0)

## 2021-12-02 NOTE — NURSING NOTE
"Chief Complaint   Patient presents with     RECHECK     Follow up       Pulse 81, height 1.651 m (5' 5\"), SpO2 97 %.    Diego Trujillo, EMT  "

## 2021-12-02 NOTE — PROGRESS NOTES
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 She was initially seen on 2019 before her surgery. Please refer to 2021 note for full history.     Today, she presents for follow up. she reports:  - increased breathing issues and throat clearing  - noticed at work, [employer], that she was having more difficulty breathing and has to catch her breath while on her feet  - notices a lot of \"crackles\" in her throat that requires her to clear her throat  - feels like her symptoms are different than the last time she presented  - she denied voice changes  - no new medical events   - does not feel as bad as she did before her first surgery      Peak flow meter: 200      PAST MEDICAL HISTORY:   Past Medical History:   Diagnosis Date     Attention deficit hyperactivity disorder (ADHD), predominantly inattentive type 2015     Binge-eating disorder, mild 2015     Family history of thyroid cancer 2014     Generalized anxiety disorder 11/15/2013     Major depressive disorder, recurrent episode, in full remission (H) 2014     Menorrhagia 10/2/2012     Obesity 2014     Ovarian cyst 10/2/2012     Screening for malignant neoplasm of cervix 2014-2012 NILM  NILM, hpv negative  NILM     27 y.o. Plan:   Cotesting 2020    BV  ; Pap test history     Subglottic stenosis 2019    Added automatically from request for surgery [id number]       PAST SURGICAL HISTORY: "   Past Surgical History:   Procedure Laterality Date     GYN SURGERY      lap removal of ovarian cyst      INJECT STEROID (LOCATION) N/A 11/5/2019    Procedure: INJECTION, STEROID;  Surgeon: Nadine Ayers MD;  Location: [facility] OR     LASER CO2 LARYNGOSCOPY, COMPLEX N/A 11/5/2019    Procedure: microdirect laryngoscopy, flexible bronchoscopy, flexible CO2 laser laryngoscopy with excision of stenosis, laryngoscopy with dilation, flexible esophagoscopy;  Surgeon: Nadine Ayers MD;  Location: [facility] OR     WISDOM TEETH EXTRACTION         CURRENT MEDICATIONS:   Current Outpatient Medications:      busPIRone (BUSPAR) 5 MG tablet, Take one (1) tablet by mouth every morning AND two (2) tablets at bedtime, Disp: , Rfl:      acetaminophen (TYLENOL) 325 MG tablet, Take 2 tablets (650 mg) by mouth every 4 hours as needed for mild pain, Disp: 50 tablet, Rfl: 0     buPROPion (WELLBUTRIN XL) 300 MG 24 hr tablet, , Disp: , Rfl:      busPIRone (BUSPAR) 10 MG tablet, Take three (3) tablets by mouth every morning AND one (1) tablet at bedtime, Disp: , Rfl:      clonazePAM (KLONOPIN) 0.125 mg quarter-tab, Take 2 tablets by mouth 2 times daily, Disp: , Rfl: 1     clonazePAM (KLONOPIN) 0.5 MG tablet, Take one half (0.5) tablets by mouth twice a day AND two (2) tablets at bedtime, Disp: , Rfl:      clonazePAM (KLONOPIN) 1 MG tablet, Take 1 mg by mouth nightly as needed, Disp: , Rfl:      escitalopram (LEXAPRO) 20 MG tablet, , Disp: , Rfl:      Escitalopram Oxalate (LEXAPRO PO), Take 20 mg by mouth daily, Disp: , Rfl:      fish oil-omega-3 fatty acids 1000 MG capsule, Take 2 g by mouth daily, Disp: , Rfl:      fluticasone (FLOVENT HFA) 110 MCG/ACT inhaler, Inhale 1 puff into the lungs 2 times daily, Disp: 1 Inhaler, Rfl: 1     magnesium gluconate (MAGONATE) 250 MG tablet, Take 400 mg by mouth daily, Disp: , Rfl:      multivitamin w/minerals (MULTI-VITAMIN) tablet, Take 1 tablet by mouth daily, Disp: , Rfl:      norgestimate-ethinyl estradiol  (ORTHO-CYCLEN, SPRINTEC) 0.25-35 MG-MCG tablet, Take 1 tablet by mouth daily, Disp: , Rfl:      omeprazole (PRILOSEC) 40 MG DR capsule, Take 1 capsule (40 mg) by mouth daily before breakfast, Disp: 90 capsule, Rfl: 3     ondansetron (ZOFRAN-ODT) 4 MG ODT tab, Take 1-2 tablets (4-8 mg) by mouth every 8 hours as needed for nausea, Disp: 4 tablet, Rfl: 0     sulfamethoxazole-trimethoprim (BACTRIM DS) 800-160 MG tablet, Take 1 tablet by mouth 2 times daily, Disp: 20 tablet, Rfl: 0     sulfamethoxazole-trimethoprim (BACTRIM DS/SEPTRA DS) 800-160 MG tablet, Take 1 tablet by mouth 2 times daily, Disp: 20 tablet, Rfl: 0     traZODone (DESYREL) 50 MG tablet, Take 0.5 to 3 tabs 30-60 minutes prior to sleep as needed for insomnia, Disp: , Rfl:      TRAZODONE HCL PO, Take 50 mg by mouth At Bedtime, Disp: , Rfl:      VYVANSE 10 MG capsule, Take one (1) capsule by mouth every morning, Disp: , Rfl:     ALLERGIES: Patient has no known allergies.    SOCIAL HISTORY:    Social History     Socioeconomic History     Marital status: Single     Spouse name: Not on file     Number of children: Not on file     Years of education: Not on file     Highest education level: Not on file   Occupational History     Not on file   Tobacco Use     Smoking status: Never Smoker     Smokeless tobacco: Never Used   Substance and Sexual Activity     Alcohol use: Yes     Comment: occas     Drug use: Never     Sexual activity: Not on file   Other Topics Concern     Parent/sibling w/ CABG, MI or angioplasty before 65F 55M? Not Asked   Social History Narrative     Not on file     Social Determinants of Health     Financial Resource Strain: Not on file   Food Insecurity: Not on file   Transportation Needs: Not on file   Physical Activity: Not on file   Stress: Not on file   Social Connections: Not on file   Intimate Partner Violence: Not on file   Housing Stability: Not on file         FAMILY HISTORY:   Family History   Problem Relation Age of Onset      Depression Mother      Substance Abuse Father      Depression Maternal Grandmother      Substance Abuse Sister      Depression Other      Bipolar Disorder Other      Anxiety Disorder Other      Schizophrenia Other      Substance Abuse Other       Non-contributory for problems with anesthesia    REVIEW OF SYSTEMS:   The patient was asked a 14 point review of systems regarding constitutional symptoms, eye symptoms, ears, nose, mouth, throat symptoms, cardiovascular symptoms, respiratory symptoms, gastrointestinal symptoms, genitourinary symptoms, musculoskeletal symptoms, integumentary symptoms, neurological symptoms, psychiatric symptoms, endocrine symptoms, hematologic/lymphatic symptoms, and allergic/ immunologic symptoms.   The pertinent factors have been included in the HPI and below.  Patient Supplied Answers to Review of Systems  [facility] ROS 12/2/2021   Psychology Frequently feeling depressed or sad, Frequently feeling anxious   Ears, Nose, Throat Nasal congestion or drainage   Cardiopulmonary Cough, Breathing problems, Wheezing     Physical Examination   The patient underwent a physical examination as described below. The pertinent positive and negative findings are summarized after the description of the examination.  Constitutional: The patient's developmental and nutritional status was assessed. The patient's voice quality was assessed.  Head and Face: The head and face were inspected for deformities. The sinuses were palpated. The salivary glands were palpated. Facial muscle strength was assessed bilaterally.  Eyes: Extraocular movements and primary gaze alignment were assessed.  Ears, Nose, Mouth and Throat: The ears and nose were examined for deformities. The nasal septum, mucosa, and turbinates were inspected by anterior rhinoscopy. The lips, teeth, and gums were examined for abnormalities. The oral mucosa, tongue, palate, tonsils, lateral and posterior pharynx were inspected for the presence of  asymmetry or mucosal lesions.    Neck: The tracheal position was noted, and the neck mass palpated to determine if there were any asymmetries, abnormal neck masses, thyromegally, or thyroid nodules.  Respiratory: The nature of the breathing and chest expansion/symmetry was observed.  Cardiovascular: The patient was examined to determine the presence of any edema or jugular venous distension.  Abdomen: The contour of the abdomen was noted.  Lymphatic: The patient was examined for infraclavicular lymphadenopathy.  Musculoskeletal: The patient was inspected for the presence of skeletal deformities.  Extremities: The extremities were examined for any clubbing or cyanosis.  Skin: The skin was examined for inflammatory or neoplastic conditions.  Neurologic: The patient's orientation, mood, and affect were noted. The cranial nerve  functions were examined.  Other pertinent positive and negative findings on physical examination:   OC/OP: no lesions   Neck: no lesions, R TH tenderness to palpation     All other physical examination findings were within normal limits and noncontributory.    Procedures   Flexible laryngoscopy (CPT 76798)      Pre-procedure diagnosis: subglottic stenosis  Post-procedure diagnosis: same as above  Indication for procedure: Ms. Orona is a 31 year old female with see above  Procedure(s): Fiberoptic Laryngoscopy    Details of Procedure: After informed consent was obtained, the patient was seated in the examination chair.  The areas of the nasopharynx as well as the hypopharynx were anesthetized with topical 4% lidocaine with 0.25% phenylephrine atomizer.  Examination of the base of tongue was performed first.  Attention was directed to any evidence of masses in the area or evidence of leukoplakia or candidal infection.  Attention was directed to the epiglottis where its size and position was determined and its movement on phonation of the vowel  e .  The piriform sinuses were then inspected for any  mass lesions or pooling of secretions.  Attention was then directed to the larynx. The vocal folds were inspected for infection or any areas of leukoplakia, for masses, polypoid degeneration, or hemorrhage.  Having done this, the arytenoids and vocal processes were inspected for erythema or evidence of granuloma formation.  The posterior commissure was then inspected for evidence of inflammatory changes in the mucosa and heaping up of mucosal tissue. The patient was then instructed to say the vowel  e .  Adduction of vocal folds to the midline was observed for any evidence of paresis or paralysis of the larynx or asymmetry in rotation of the larynx to the left or right. The patient was asked to breathe and the degree of abduction was noted bilaterally.  Subglottic view of the larynx was obtained for any additional mass lesions or mucosal changes.  Finally the post cricoid was examined for evidence of pooling of secretions, as well as the pharyngeal wall mucosa.   Anesthesia type: 0.25% phenylephrine    Findings:  Anatomic/physiological deviations: RNC, grade 3, 80% narrowing subglottically   Right vocal process: No restriction of mobility   Left vocal process: No restriction of mobility  Glottal gap: Complete glottal closure  Supraglottic structures: Normal  Hypopharynx: Normal     Estimated Blood Loss: minimal  Complications: None  Disposition: Patient tolerated the procedure well              Review of Relevant Clinical Data   I personally reviewed:  Labs:  No results found for: TSH  No results found for: NA, CO2, BUN, CREAT, GLUCOSE, PHOS  No results found for: WBC, HGB, HCT, MCV, PLT  No results found for: PT, PTT, INR  No results found for: TERI  No components found for: RHEUMATOIDFACTOR,  RF  No results found for: CRP  No components found for: CKTOT, URICACID  No components found for: C3, C4, DSDNAAB, NDNAABIFA  No results found for: MPOAB    Patient reported Quality of Life (QOL) Measures   Patient Supplied  "Answers To VHI Questionnaire  Voice Handicap Index (VHI-10) 1/2/2020   My voice makes it difficult for people to hear me 1   People have difficulty understanding me in a noisy room 1   My voice difficulties restrict my personal and social life.  0   I feel left out of conversations because of my voice 0   My voice problem causes me to lose income 0   I feel as though I have to strain to produce voice 0   The clarity of my voice is unpredictable 0   My voice problem upsets me 0   My voice makes me feel handicapped 0   People ask, \"What's wrong with your voice?\" 0   VHI-10 2       Patient Supplied Answers To EAT Questionnaire  Eating Assessment Tool (EAT-10) 1/2/2020   My swallowing problem has caused me to lose weight 0   My swallowing problem interferes with my ability to go out for meals 0   Swallowing liquids takes extra effort 0   Swallowing solids takes extra effort 0   Swallowing pills takes extra effort 0   Swallowing is painful 0   The pleasure of eating is affected by my swallowing 0   When I swallow food sticks in my throat 1   I cough when I eat 1   Swallowing is stressful 0   EAT-10 2       Patient Supplied Answers To CSI Questionnaire  No flowsheet data found.      Patient Supplied Answers to Dyspnea Index Questionnaire:  Dyspnea Index 1/2/2020   1. I have trouble getting air in. 0   2. I feel tightness in my throat when I am having angel breathing problem. 0   3. It takes more effort to breathe than it used to. 0   4. Change in weather affect my breathing problem. 2   5. My breathing gets worse with stress. 2   6. I make sound/noise breathing in 1   7. I have to strain to breathe. 0   8. My shortness of breath gets worse with exercise or physical activity 1   9. My breathing problem makes me feel stressed. 1   10. My breathing problem casuses me to restrict my personal and social life. 0   Dyspnea Index Total Score 7       Impression & Plan     IMPRESSION: Ms. Orona is a 29 year old female who is being " seen for the following:     Dyspnea  - due to subglottic stenosis   - intubation history possible in 2012 due to an ovarian cyst - not sure  - diabetes - HgA1c 12/2/21 - 5.7   - autoimmune labs - from 10/21/19 - TERI, RF, ANCA - negative  - biopsy none  - imaging none  - patient symptoms with dyspnea  - scope showed grade 3 subglottic stenosis on 11/1/19  - discussed etiology of trauma (intubation), autoimmune, diabetes or idiopathic. In this case this is most likely DM and prior intubation induced  - discussed treatment with observation, conservative management with oral abx/steroids/reflux precautions, steroid injections, endoscopic procedures, open resection and bypass procedure with tracheotomy  - patient elects to proceed with endoscopic surgery  - s/p  MDL with CO2 laser resection, dilation and steroid injection on 11/5/19  - symptoms have come back for the past 6 months (reported today 12/2/21)  - scope today shows grade 3, 80% narrowing subglottically  - peak flow meter today is 200  - discussed she needs another dilation after which we will discuss long term management again to establish goals and a follow up plan  - discussed given her severity of stenosis as well as her weight, her surgery is high risk and she is at risk of a trach   Plan  - schedule surgery  - will need to discuss weight management and diabetic control after the surgery      RETURN VISIT: after surgery    Scribe Disclosure:  Miriam FONSECA, am serving as a scribe to document services personally performed by Nadine Hoffmann MD at this visit, based upon the provider's statements to me. All documentation has been reviewed by the aforementioned provider prior to being entered into the official medical record.     Scribe Preparation Attestation:  Miriam FONSECA, a scribe, prepared the chart for today's encounter.          Nadine Hoffmann MD    Laryngology    [facility]  Department of  Otolaryngology  - Head and Neck Surgery  Clin[facility] & [facility] Center  [address] [place] [id number]  Appointment line: [phone]  Fax: [phone]  [email or web address]

## 2021-12-02 NOTE — LETTER
"2021       RE: Alise Orona  [address] [facility] [address] [place] [address]     Dear Colleague,    Thank you for referring your patient, Alise Orona, to the [facility] [place] at [facility]. Please see a copy of my visit note below.        [facility]   at the [facility]   Otolaryngology Clinic     Patient: Alise Orona    MRN: [id number]    : 1990    Age/Gender: 31 year old female  Date of Service: 2021  Rendering Provider:   Nadine Hoffmann MD     Chief Complaint   Subglottic stenosis  S/p MDL with CO2 laser resection, dilation and steroid injection on 19  Interval History   HISTORY OF PRESENT ILLNESS: Ms. 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 She was initially seen on 2019 before her surgery. Please refer to 2021 note for full history.     Today, she presents for follow up. she reports:  - increased breathing issues and throat clearing  - noticed at work, [employer], that she was having more difficulty breathing and has to catch her breath while on her feet  - notices a lot of \"crackles\" in her throat that requires her to clear her throat  - feels like her symptoms are different than the last time she presented  - she denied voice changes  - no new medical events   - does not feel as bad as she did before her first surgery      Peak flow meter: 200      PAST MEDICAL HISTORY:   Past Medical History:   Diagnosis Date     Attention deficit hyperactivity disorder (ADHD), predominantly inattentive type 2015     Binge-eating disorder, mild 2015     Family history of thyroid cancer 2014     Generalized anxiety disorder 11/15/2013     Major depressive disorder, recurrent episode, in full remission (H) 2014     Menorrhagia " 10/2/2012     Obesity 12/8/2014     Ovarian cyst 10/2/2012     Screening for malignant neoplasm of cervix 12/22/2014 2010-2012 NILM 2014 NILM, hpv negative 2017 NILM     27 y.o. Plan:   Cotesting 5/2020    BV  ; Pap test history     Subglottic stenosis 11/1/2019    Added automatically from request for surgery [id number]       PAST SURGICAL HISTORY:   Past Surgical History:   Procedure Laterality Date     GYN SURGERY      lap removal of ovarian cyst      INJECT STEROID (LOCATION) N/A 11/5/2019    Procedure: INJECTION, STEROID;  Surgeon: Nadine Ayers MD;  Location: [facility] OR     LASER CO2 LARYNGOSCOPY, COMPLEX N/A 11/5/2019    Procedure: microdirect laryngoscopy, flexible bronchoscopy, flexible CO2 laser laryngoscopy with excision of stenosis, laryngoscopy with dilation, flexible esophagoscopy;  Surgeon: Nadine Ayers MD;  Location: [facility] OR     WISDOM TEETH EXTRACTION         CURRENT MEDICATIONS:   Current Outpatient Medications:      busPIRone (BUSPAR) 5 MG tablet, Take one (1) tablet by mouth every morning AND two (2) tablets at bedtime, Disp: , Rfl:      acetaminophen (TYLENOL) 325 MG tablet, Take 2 tablets (650 mg) by mouth every 4 hours as needed for mild pain, Disp: 50 tablet, Rfl: 0     buPROPion (WELLBUTRIN XL) 300 MG 24 hr tablet, , Disp: , Rfl:      busPIRone (BUSPAR) 10 MG tablet, Take three (3) tablets by mouth every morning AND one (1) tablet at bedtime, Disp: , Rfl:      clonazePAM (KLONOPIN) 0.125 mg quarter-tab, Take 2 tablets by mouth 2 times daily, Disp: , Rfl: 1     clonazePAM (KLONOPIN) 0.5 MG tablet, Take one half (0.5) tablets by mouth twice a day AND two (2) tablets at bedtime, Disp: , Rfl:      clonazePAM (KLONOPIN) 1 MG tablet, Take 1 mg by mouth nightly as needed, Disp: , Rfl:      escitalopram (LEXAPRO) 20 MG tablet, , Disp: , Rfl:      Escitalopram Oxalate (LEXAPRO PO), Take 20 mg by mouth daily, Disp: , Rfl:      fish oil-omega-3 fatty acids 1000 MG capsule, Take 2 g by mouth daily, Disp:  , Rfl:      fluticasone (FLOVENT HFA) 110 MCG/ACT inhaler, Inhale 1 puff into the lungs 2 times daily, Disp: 1 Inhaler, Rfl: 1     magnesium gluconate (MAGONATE) 250 MG tablet, Take 400 mg by mouth daily, Disp: , Rfl:      multivitamin w/minerals (MULTI-VITAMIN) tablet, Take 1 tablet by mouth daily, Disp: , Rfl:      norgestimate-ethinyl estradiol (ORTHO-CYCLEN, SPRINTEC) 0.25-35 MG-MCG tablet, Take 1 tablet by mouth daily, Disp: , Rfl:      omeprazole (PRILOSEC) 40 MG DR capsule, Take 1 capsule (40 mg) by mouth daily before breakfast, Disp: 90 capsule, Rfl: 3     ondansetron (ZOFRAN-ODT) 4 MG ODT tab, Take 1-2 tablets (4-8 mg) by mouth every 8 hours as needed for nausea, Disp: 4 tablet, Rfl: 0     sulfamethoxazole-trimethoprim (BACTRIM DS) 800-160 MG tablet, Take 1 tablet by mouth 2 times daily, Disp: 20 tablet, Rfl: 0     sulfamethoxazole-trimethoprim (BACTRIM DS/SEPTRA DS) 800-160 MG tablet, Take 1 tablet by mouth 2 times daily, Disp: 20 tablet, Rfl: 0     traZODone (DESYREL) 50 MG tablet, Take 0.5 to 3 tabs 30-60 minutes prior to sleep as needed for insomnia, Disp: , Rfl:      TRAZODONE HCL PO, Take 50 mg by mouth At Bedtime, Disp: , Rfl:      VYVANSE 10 MG capsule, Take one (1) capsule by mouth every morning, Disp: , Rfl:     ALLERGIES: Patient has no known allergies.    SOCIAL HISTORY:    Social History     Socioeconomic History     Marital status: Single     Spouse name: Not on file     Number of children: Not on file     Years of education: Not on file     Highest education level: Not on file   Occupational History     Not on file   Tobacco Use     Smoking status: Never Smoker     Smokeless tobacco: Never Used   Substance and Sexual Activity     Alcohol use: Yes     Comment: occas     Drug use: Never     Sexual activity: Not on file   Other Topics Concern     Parent/sibling w/ CABG, MI or angioplasty before 65F 55M? Not Asked   Social History Narrative     Not on file     Social Determinants of Health      Financial Resource Strain: Not on file   Food Insecurity: Not on file   Transportation Needs: Not on file   Physical Activity: Not on file   Stress: Not on file   Social Connections: Not on file   Intimate Partner Violence: Not on file   Housing Stability: Not on file         FAMILY HISTORY:   Family History   Problem Relation Age of Onset     Depression Mother      Substance Abuse Father      Depression Maternal Grandmother      Substance Abuse Sister      Depression Other      Bipolar Disorder Other      Anxiety Disorder Other      Schizophrenia Other      Substance Abuse Other       Non-contributory for problems with anesthesia    REVIEW OF SYSTEMS:   The patient was asked a 14 point review of systems regarding constitutional symptoms, eye symptoms, ears, nose, mouth, throat symptoms, cardiovascular symptoms, respiratory symptoms, gastrointestinal symptoms, genitourinary symptoms, musculoskeletal symptoms, integumentary symptoms, neurological symptoms, psychiatric symptoms, endocrine symptoms, hematologic/lymphatic symptoms, and allergic/ immunologic symptoms.   The pertinent factors have been included in the HPI and below.  Patient Supplied Answers to Review of Systems  [facility] ENT ROS 12/2/2021   Psychology Frequently feeling depressed or sad, Frequently feeling anxious   Ears, Nose, Throat Nasal congestion or drainage   Cardiopulmonary Cough, Breathing problems, Wheezing     Physical Examination   The patient underwent a physical examination as described below. The pertinent positive and negative findings are summarized after the description of the examination.  Constitutional: The patient's developmental and nutritional status was assessed. The patient's voice quality was assessed.  Head and Face: The head and face were inspected for deformities. The sinuses were palpated. The salivary glands were palpated. Facial muscle strength was assessed bilaterally.  Eyes: Extraocular movements and primary gaze alignment  were assessed.  Ears, Nose, Mouth and Throat: The ears and nose were examined for deformities. The nasal septum, mucosa, and turbinates were inspected by anterior rhinoscopy. The lips, teeth, and gums were examined for abnormalities. The oral mucosa, tongue, palate, tonsils, lateral and posterior pharynx were inspected for the presence of asymmetry or mucosal lesions.    Neck: The tracheal position was noted, and the neck mass palpated to determine if there were any asymmetries, abnormal neck masses, thyromegally, or thyroid nodules.  Respiratory: The nature of the breathing and chest expansion/symmetry was observed.  Cardiovascular: The patient was examined to determine the presence of any edema or jugular venous distension.  Abdomen: The contour of the abdomen was noted.  Lymphatic: The patient was examined for infraclavicular lymphadenopathy.  Musculoskeletal: The patient was inspected for the presence of skeletal deformities.  Extremities: The extremities were examined for any clubbing or cyanosis.  Skin: The skin was examined for inflammatory or neoplastic conditions.  Neurologic: The patient's orientation, mood, and affect were noted. The cranial nerve  functions were examined.  Other pertinent positive and negative findings on physical examination:   OC/OP: no lesions   Neck: no lesions, R TH tenderness to palpation     All other physical examination findings were within normal limits and noncontributory.    Procedures   Flexible laryngoscopy (CPT 15855)      Pre-procedure diagnosis: subglottic stenosis  Post-procedure diagnosis: same as above  Indication for procedure: Ms. Orona is a 31 year old female with see above  Procedure(s): Fiberoptic Laryngoscopy    Details of Procedure: After informed consent was obtained, the patient was seated in the examination chair.  The areas of the nasopharynx as well as the hypopharynx were anesthetized with topical 4% lidocaine with 0.25% phenylephrine atomizer.   Examination of the base of tongue was performed first.  Attention was directed to any evidence of masses in the area or evidence of leukoplakia or candidal infection.  Attention was directed to the epiglottis where its size and position was determined and its movement on phonation of the vowel  e .  The piriform sinuses were then inspected for any mass lesions or pooling of secretions.  Attention was then directed to the larynx. The vocal folds were inspected for infection or any areas of leukoplakia, for masses, polypoid degeneration, or hemorrhage.  Having done this, the arytenoids and vocal processes were inspected for erythema or evidence of granuloma formation.  The posterior commissure was then inspected for evidence of inflammatory changes in the mucosa and heaping up of mucosal tissue. The patient was then instructed to say the vowel  e .  Adduction of vocal folds to the midline was observed for any evidence of paresis or paralysis of the larynx or asymmetry in rotation of the larynx to the left or right. The patient was asked to breathe and the degree of abduction was noted bilaterally.  Subglottic view of the larynx was obtained for any additional mass lesions or mucosal changes.  Finally the post cricoid was examined for evidence of pooling of secretions, as well as the pharyngeal wall mucosa.   Anesthesia type: 0.25% phenylephrine    Findings:  Anatomic/physiological deviations: RNC, grade 3, 80% narrowing subglottically   Right vocal process: No restriction of mobility   Left vocal process: No restriction of mobility  Glottal gap: Complete glottal closure  Supraglottic structures: Normal  Hypopharynx: Normal     Estimated Blood Loss: minimal  Complications: None  Disposition: Patient tolerated the procedure well              Review of Relevant Clinical Data   I personally reviewed:  Labs:  No results found for: TSH  No results found for: NA, CO2, BUN, CREAT, GLUCOSE, PHOS  No results found for: WBC,  "HGB, HCT, MCV, PLT  No results found for: PT, PTT, INR  No results found for: TERI  No components found for: RHEUMATOIDFACTOR,  RF  No results found for: CRP  No components found for: CKTOT, URICACID  No components found for: C3, C4, DSDNAAB, NDNAABIFA  No results found for: MPOAB    Patient reported Quality of Life (QOL) Measures   Patient Supplied Answers To VHI Questionnaire  Voice Handicap Index (VHI-10) 1/2/2020   My voice makes it difficult for people to hear me 1   People have difficulty understanding me in a noisy room 1   My voice difficulties restrict my personal and social life.  0   I feel left out of conversations because of my voice 0   My voice problem causes me to lose income 0   I feel as though I have to strain to produce voice 0   The clarity of my voice is unpredictable 0   My voice problem upsets me 0   My voice makes me feel handicapped 0   People ask, \"What's wrong with your voice?\" 0   VHI-10 2       Patient Supplied Answers To EAT Questionnaire  Eating Assessment Tool (EAT-10) 1/2/2020   My swallowing problem has caused me to lose weight 0   My swallowing problem interferes with my ability to go out for meals 0   Swallowing liquids takes extra effort 0   Swallowing solids takes extra effort 0   Swallowing pills takes extra effort 0   Swallowing is painful 0   The pleasure of eating is affected by my swallowing 0   When I swallow food sticks in my throat 1   I cough when I eat 1   Swallowing is stressful 0   EAT-10 2       Patient Supplied Answers To CSI Questionnaire  No flowsheet data found.      Patient Supplied Answers to Dyspnea Index Questionnaire:  Dyspnea Index 1/2/2020   1. I have trouble getting air in. 0   2. I feel tightness in my throat when I am having angel breathing problem. 0   3. It takes more effort to breathe than it used to. 0   4. Change in weather affect my breathing problem. 2   5. My breathing gets worse with stress. 2   6. I make sound/noise breathing in 1   7. I have " to strain to breathe. 0   8. My shortness of breath gets worse with exercise or physical activity 1   9. My breathing problem makes me feel stressed. 1   10. My breathing problem casuses me to restrict my personal and social life. 0   Dyspnea Index Total Score 7       Impression & Plan     IMPRESSION: Ms. Orona is a 29 year old female who is being seen for the following:     Dyspnea  - due to subglottic stenosis   - intubation history possible in 2012 due to an ovarian cyst - not sure  - diabetes - HgA1c 12/2/21 - 5.7   - autoimmune labs - from 10/21/19 - TERI, RF, ANCA - negative  - biopsy none  - imaging none  - patient symptoms with dyspnea  - scope showed grade 3 subglottic stenosis on 11/1/19  - discussed etiology of trauma (intubation), autoimmune, diabetes or idiopathic. In this case this is most likely DM and prior intubation induced  - discussed treatment with observation, conservative management with oral abx/steroids/reflux precautions, steroid injections, endoscopic procedures, open resection and bypass procedure with tracheotomy  - patient elects to proceed with endoscopic surgery  - s/p  MDL with CO2 laser resection, dilation and steroid injection on 11/5/19  - symptoms have come back for the past 6 months (reported today 12/2/21)  - scope today shows grade 3, 80% narrowing subglottically  - peak flow meter today is 200  - discussed she needs another dilation after which we will discuss long term management again to establish goals and a follow up plan  - discussed given her severity of stenosis as well as her weight, her surgery is high risk and she is at risk of a trach   Plan  - schedule surgery  - will need to discuss weight management and diabetic control after the surgery      RETURN VISIT: after surgery    Scribe Disclosure:  I, Miriam Lomax, am serving as a scribe to document services personally performed by Nadine Hoffmann MD at this visit, based upon the provider's statements to me. All  documentation has been reviewed by the aforementioned provider prior to being entered into the official medical record.     Scribe Preparation Attestation:  Miriam FONSECA, a scribe, prepared the chart for today's encounter.          Nadine Hoffmann MD    Laryngology    [facility]  Department of  Otolaryngology - Head and Neck Surgery  [facility] [address] [address]  Appointment line: [phone]  Fax: [phone]  [email or web address]

## 2021-12-02 NOTE — PATIENT INSTRUCTIONS
1.  You were seen in the ENT Clinic today by . If you have any questions or concerns after your appointment, please call [phone]. Press option #1 for scheduling related needs. Press option #3 for Nurse advice.    2.   has recommended  the following:   - surgery.  will contact you with date options.    3.  Plan is to return to clinic for post operative follow up      Gloria Marte LPN  [phone]  [facility] - Otolaryngology

## 2021-12-07 DIAGNOSIS — Z11.59 ENCOUNTER FOR SCREENING FOR OTHER VIRAL DISEASES: ICD-10-CM

## 2021-12-18 ENCOUNTER — LAB (OUTPATIENT)
Dept: LAB | Facility: CLINIC | Age: 31
End: 2021-12-18
Attending: OTOLARYNGOLOGY
Payer: COMMERCIAL

## 2021-12-18 DIAGNOSIS — Z11.59 ENCOUNTER FOR SCREENING FOR OTHER VIRAL DISEASES: ICD-10-CM

## 2021-12-18 LAB — SARS-COV-2 RNA RESP QL NAA+PROBE: NEGATIVE

## 2021-12-18 PROCEDURE — 99000 SPECIMEN HANDLING OFFICE-LAB: CPT | Performed by: PATHOLOGY

## 2021-12-18 PROCEDURE — U0005 INFEC AGEN DETEC AMPLI PROBE: HCPCS | Mod: 90 | Performed by: PATHOLOGY

## 2021-12-18 PROCEDURE — U0003 INFECTIOUS AGENT DETECTION BY NUCLEIC ACID (DNA OR RNA); SEVERE ACUTE RESPIRATORY SYNDROME CORONAVIRUS 2 (SARS-COV-2) (CORONAVIRUS DISEASE [COVID-19]), AMPLIFIED PROBE TECHNIQUE, MAKING USE OF HIGH THROUGHPUT TECHNOLOGIES AS DESCRIBED BY CMS-2020-01-R: HCPCS | Mod: 90 | Performed by: PATHOLOGY

## 2021-12-20 ENCOUNTER — ANESTHESIA EVENT (OUTPATIENT)
Dept: SURGERY | Facility: CLINIC | Age: 31
End: 2021-12-20
Payer: COMMERCIAL

## 2021-12-22 ENCOUNTER — ANESTHESIA (OUTPATIENT)
Dept: SURGERY | Facility: CLINIC | Age: 31
End: 2021-12-22
Payer: COMMERCIAL

## 2021-12-22 ENCOUNTER — HOSPITAL ENCOUNTER (OUTPATIENT)
Facility: CLINIC | Age: 31
Discharge: HOME | End: 2021-12-22
Attending: OTOLARYNGOLOGY | Admitting: OTOLARYNGOLOGY
Payer: COMMERCIAL

## 2021-12-22 VITALS
SYSTOLIC BLOOD PRESSURE: 120 MMHG | HEIGHT: 65 IN | HEART RATE: 92 BPM | DIASTOLIC BLOOD PRESSURE: 66 MMHG | RESPIRATION RATE: 18 BRPM | WEIGHT: 280.65 LBS | OXYGEN SATURATION: 97 % | TEMPERATURE: 98.3 F | BODY MASS INDEX: 46.76 KG/M2

## 2021-12-22 DIAGNOSIS — J38.6 SUBGLOTTIC STENOSIS: Primary | ICD-10-CM

## 2021-12-22 LAB — GLUCOSE BLDC GLUCOMTR-MCNC: 73 MG/DL (ref 70–99)

## 2021-12-22 PROCEDURE — 360N000077 HC SURGERY LEVEL 4, PER MIN: Performed by: OTOLARYNGOLOGY

## 2021-12-22 PROCEDURE — 999N000141 HC STATISTIC PRE-PROCEDURE NURSING ASSESSMENT: Performed by: OTOLARYNGOLOGY

## 2021-12-22 PROCEDURE — 250N000025 HC SEVOFLURANE, PER MIN: Performed by: OTOLARYNGOLOGY

## 2021-12-22 PROCEDURE — 710N000010 HC RECOVERY PHASE 1, LEVEL 2, PER MIN: Performed by: OTOLARYNGOLOGY

## 2021-12-22 PROCEDURE — 250N000009 HC RX 250

## 2021-12-22 PROCEDURE — 370N000017 HC ANESTHESIA TECHNICAL FEE, PER MIN: Performed by: OTOLARYNGOLOGY

## 2021-12-22 PROCEDURE — 82962 GLUCOSE BLOOD TEST: CPT

## 2021-12-22 PROCEDURE — 250N000011 HC RX IP 250 OP 636: Performed by: OTOLARYNGOLOGY

## 2021-12-22 PROCEDURE — 31641 BRONCHOSCOPY TREAT BLOCKAGE: CPT | Mod: GC | Performed by: OTOLARYNGOLOGY

## 2021-12-22 PROCEDURE — 272N000001 HC OR GENERAL SUPPLY STERILE: Performed by: OTOLARYNGOLOGY

## 2021-12-22 PROCEDURE — 250N000011 HC RX IP 250 OP 636

## 2021-12-22 PROCEDURE — 250N000013 HC RX MED GY IP 250 OP 250 PS 637: Performed by: ANESTHESIOLOGY

## 2021-12-22 PROCEDURE — 710N000012 HC RECOVERY PHASE 2, PER MINUTE: Performed by: OTOLARYNGOLOGY

## 2021-12-22 PROCEDURE — 31630 BRONCHOSCOPY DILATE/FX REPR: CPT | Mod: 51 | Performed by: OTOLARYNGOLOGY

## 2021-12-22 PROCEDURE — 31573 LARGSC W/THER INJECTION: CPT | Mod: 51 | Performed by: OTOLARYNGOLOGY

## 2021-12-22 PROCEDURE — C1726 CATH, BAL DIL, NON-VASCULAR: HCPCS | Performed by: OTOLARYNGOLOGY

## 2021-12-22 PROCEDURE — 272N000002 HC OR SUPPLY OTHER OPNP: Performed by: OTOLARYNGOLOGY

## 2021-12-22 PROCEDURE — 258N000003 HC RX IP 258 OP 636

## 2021-12-22 PROCEDURE — 250N000009 HC RX 250: Performed by: OTOLARYNGOLOGY

## 2021-12-22 RX ORDER — ONDANSETRON 2 MG/ML
4 INJECTION INTRAMUSCULAR; INTRAVENOUS EVERY 30 MIN PRN
Status: DISCONTINUED | OUTPATIENT
Start: 2021-12-22 | End: 2021-12-22 | Stop reason: HOSPADM

## 2021-12-22 RX ORDER — ACETAMINOPHEN 325 MG/1
975 TABLET ORAL ONCE
Status: COMPLETED | OUTPATIENT
Start: 2021-12-22 | End: 2021-12-22

## 2021-12-22 RX ORDER — PROPOFOL 10 MG/ML
INJECTION, EMULSION INTRAVENOUS PRN
Status: DISCONTINUED | OUTPATIENT
Start: 2021-12-22 | End: 2021-12-22

## 2021-12-22 RX ORDER — LIDOCAINE HYDROCHLORIDE 20 MG/ML
INJECTION, SOLUTION INFILTRATION; PERINEURAL PRN
Status: DISCONTINUED | OUTPATIENT
Start: 2021-12-22 | End: 2021-12-22

## 2021-12-22 RX ORDER — DEXAMETHASONE SODIUM PHOSPHATE 4 MG/ML
INJECTION, SOLUTION INTRA-ARTICULAR; INTRALESIONAL; INTRAMUSCULAR; INTRAVENOUS; SOFT TISSUE PRN
Status: DISCONTINUED | OUTPATIENT
Start: 2021-12-22 | End: 2021-12-22

## 2021-12-22 RX ORDER — LIDOCAINE 40 MG/G
CREAM TOPICAL
Status: DISCONTINUED | OUTPATIENT
Start: 2021-12-22 | End: 2021-12-22 | Stop reason: HOSPADM

## 2021-12-22 RX ORDER — LABETALOL HYDROCHLORIDE 5 MG/ML
10 INJECTION, SOLUTION INTRAVENOUS
Status: DISCONTINUED | OUTPATIENT
Start: 2021-12-22 | End: 2021-12-22 | Stop reason: HOSPADM

## 2021-12-22 RX ORDER — SULFAMETHOXAZOLE/TRIMETHOPRIM 800-160 MG
1 TABLET ORAL 2 TIMES DAILY
Qty: 14 TABLET | Refills: 0 | Status: SHIPPED | OUTPATIENT
Start: 2021-12-22

## 2021-12-22 RX ORDER — TRIAMCINOLONE ACETONIDE 40 MG/ML
INJECTION, SUSPENSION INTRA-ARTICULAR; INTRAMUSCULAR PRN
Status: DISCONTINUED | OUTPATIENT
Start: 2021-12-22 | End: 2021-12-22 | Stop reason: HOSPADM

## 2021-12-22 RX ORDER — ONDANSETRON 4 MG/1
4 TABLET, ORALLY DISINTEGRATING ORAL EVERY 30 MIN PRN
Status: DISCONTINUED | OUTPATIENT
Start: 2021-12-22 | End: 2021-12-22 | Stop reason: HOSPADM

## 2021-12-22 RX ORDER — HYDROMORPHONE HCL IN WATER/PF 6 MG/30 ML
0.4 PATIENT CONTROLLED ANALGESIA SYRINGE INTRAVENOUS EVERY 5 MIN PRN
Status: DISCONTINUED | OUTPATIENT
Start: 2021-12-22 | End: 2021-12-22 | Stop reason: HOSPADM

## 2021-12-22 RX ORDER — SODIUM CHLORIDE, SODIUM LACTATE, POTASSIUM CHLORIDE, CALCIUM CHLORIDE 600; 310; 30; 20 MG/100ML; MG/100ML; MG/100ML; MG/100ML
INJECTION, SOLUTION INTRAVENOUS CONTINUOUS
Status: DISCONTINUED | OUTPATIENT
Start: 2021-12-22 | End: 2021-12-22 | Stop reason: HOSPADM

## 2021-12-22 RX ORDER — DEXMEDETOMIDINE HYDROCHLORIDE 4 UG/ML
.1-1.2 INJECTION, SOLUTION INTRAVENOUS CONTINUOUS
Status: DISCONTINUED | OUTPATIENT
Start: 2021-12-22 | End: 2021-12-22 | Stop reason: HOSPADM

## 2021-12-22 RX ORDER — ACETAMINOPHEN 325 MG/1
650 TABLET ORAL EVERY 4 HOURS PRN
Qty: 50 TABLET | Refills: 0 | Status: SHIPPED | OUTPATIENT
Start: 2021-12-22

## 2021-12-22 RX ORDER — OXYCODONE HYDROCHLORIDE 5 MG/1
5 TABLET ORAL EVERY 4 HOURS PRN
Status: DISCONTINUED | OUTPATIENT
Start: 2021-12-22 | End: 2021-12-22 | Stop reason: HOSPADM

## 2021-12-22 RX ORDER — ONDANSETRON 2 MG/ML
INJECTION INTRAMUSCULAR; INTRAVENOUS PRN
Status: DISCONTINUED | OUTPATIENT
Start: 2021-12-22 | End: 2021-12-22

## 2021-12-22 RX ORDER — LIDOCAINE HYDROCHLORIDE 40 MG/ML
SOLUTION TOPICAL PRN
Status: DISCONTINUED | OUTPATIENT
Start: 2021-12-22 | End: 2021-12-22 | Stop reason: HOSPADM

## 2021-12-22 RX ORDER — FLUTICASONE PROPIONATE 220 UG/1
2 AEROSOL, METERED RESPIRATORY (INHALATION) 2 TIMES DAILY
Qty: 12 G | Refills: 0 | Status: SHIPPED | OUTPATIENT
Start: 2021-12-22 | End: 2022-05-27

## 2021-12-22 RX ORDER — FENTANYL CITRATE 50 UG/ML
INJECTION, SOLUTION INTRAMUSCULAR; INTRAVENOUS PRN
Status: DISCONTINUED | OUTPATIENT
Start: 2021-12-22 | End: 2021-12-22

## 2021-12-22 RX ORDER — HYDRALAZINE HYDROCHLORIDE 20 MG/ML
2.5-5 INJECTION INTRAMUSCULAR; INTRAVENOUS EVERY 10 MIN PRN
Status: DISCONTINUED | OUTPATIENT
Start: 2021-12-22 | End: 2021-12-22 | Stop reason: HOSPADM

## 2021-12-22 RX ORDER — SODIUM CHLORIDE, SODIUM LACTATE, POTASSIUM CHLORIDE, CALCIUM CHLORIDE 600; 310; 30; 20 MG/100ML; MG/100ML; MG/100ML; MG/100ML
INJECTION, SOLUTION INTRAVENOUS CONTINUOUS PRN
Status: DISCONTINUED | OUTPATIENT
Start: 2021-12-22 | End: 2021-12-22

## 2021-12-22 RX ORDER — OXYCODONE HYDROCHLORIDE 5 MG/1
5 TABLET ORAL EVERY 6 HOURS PRN
Qty: 6 TABLET | Refills: 0 | Status: SHIPPED | OUTPATIENT
Start: 2021-12-22

## 2021-12-22 RX ORDER — FENTANYL CITRATE 50 UG/ML
50 INJECTION, SOLUTION INTRAMUSCULAR; INTRAVENOUS EVERY 5 MIN PRN
Status: DISCONTINUED | OUTPATIENT
Start: 2021-12-22 | End: 2021-12-22 | Stop reason: HOSPADM

## 2021-12-22 RX ADMIN — FENTANYL CITRATE 50 MCG: 50 INJECTION, SOLUTION INTRAMUSCULAR; INTRAVENOUS at 08:35

## 2021-12-22 RX ADMIN — LIDOCAINE HYDROCHLORIDE 100 MG: 20 INJECTION, SOLUTION INFILTRATION; PERINEURAL at 08:06

## 2021-12-22 RX ADMIN — ONDANSETRON 4 MG: 2 INJECTION INTRAMUSCULAR; INTRAVENOUS at 08:32

## 2021-12-22 RX ADMIN — SODIUM CHLORIDE, POTASSIUM CHLORIDE, SODIUM LACTATE AND CALCIUM CHLORIDE: 600; 310; 30; 20 INJECTION, SOLUTION INTRAVENOUS at 07:43

## 2021-12-22 RX ADMIN — PROPOFOL 50 MG: 10 INJECTION, EMULSION INTRAVENOUS at 08:07

## 2021-12-22 RX ADMIN — MIDAZOLAM 2 MG: 1 INJECTION INTRAMUSCULAR; INTRAVENOUS at 07:35

## 2021-12-22 RX ADMIN — DEXAMETHASONE SODIUM PHOSPHATE 10 MG: 4 INJECTION, SOLUTION INTRA-ARTICULAR; INTRALESIONAL; INTRAMUSCULAR; INTRAVENOUS; SOFT TISSUE at 08:32

## 2021-12-22 RX ADMIN — ACETAMINOPHEN 975 MG: 325 TABLET, FILM COATED ORAL at 07:00

## 2021-12-22 RX ADMIN — FENTANYL CITRATE 50 MCG: 50 INJECTION, SOLUTION INTRAMUSCULAR; INTRAVENOUS at 09:00

## 2021-12-22 RX ADMIN — FENTANYL CITRATE 50 MCG: 50 INJECTION, SOLUTION INTRAMUSCULAR; INTRAVENOUS at 09:07

## 2021-12-22 RX ADMIN — HYDROMORPHONE HYDROCHLORIDE 0.4 MG: 0.2 INJECTION, SOLUTION INTRAMUSCULAR; INTRAVENOUS; SUBCUTANEOUS at 09:19

## 2021-12-22 RX ADMIN — PHENYLEPHRINE HYDROCHLORIDE 100 MCG: 10 INJECTION INTRAVENOUS at 08:27

## 2021-12-22 RX ADMIN — PHENYLEPHRINE HYDROCHLORIDE 100 MCG: 10 INJECTION INTRAVENOUS at 08:15

## 2021-12-22 RX ADMIN — HYDROMORPHONE HYDROCHLORIDE 0.2 MG: 0.2 INJECTION, SOLUTION INTRAMUSCULAR; INTRAVENOUS; SUBCUTANEOUS at 09:51

## 2021-12-22 RX ADMIN — ONDANSETRON 4 MG: 2 INJECTION INTRAMUSCULAR; INTRAVENOUS at 11:40

## 2021-12-22 ASSESSMENT — MIFFLIN-ST. JEOR: SCORE: 1983.88

## 2021-12-22 NOTE — OR NURSING
Contacted Dr. Hoffmann to update on patient's  pain and concerns related to pain management at home after discontinue.  Dr. Hoffmann will e-scribe medication for home use.

## 2021-12-22 NOTE — ANESTHESIA POSTPROCEDURE EVALUATION
Patient: Alise Orona    Procedure: Procedure(s):  flexible bronchoscopy, carbon dioxide laser resection of stenosis, CRE balloon dilation  steroid injection       Diagnosis:Subglottic stenosis [J38.6]  Morbid obesity (H) [E66.01]  Diagnosis Additional Information: No value filed.    Anesthesia Type:  General    Note:  Disposition: Outpatient   Postop Pain Control: Uneventful            Sign Out: Well controlled pain   PONV: No   Neuro/Psych: Uneventful            Sign Out: Acceptable/Baseline neuro status   Airway/Respiratory: Uneventful            Sign Out: Acceptable/Baseline resp. status   CV/Hemodynamics: Uneventful            Sign Out: Acceptable CV status; No obvious hypovolemia; No obvious fluid overload   Other NRE: NONE   DID A NON-ROUTINE EVENT OCCUR? No           Last vitals:  Vitals Value Taken Time   /61 12/22/21 0945   Temp     Pulse 96 12/22/21 0950   Resp 13 12/22/21 0950   SpO2 94 % 12/22/21 0950   Vitals shown include unvalidated device data.    Electronically Signed By: David Banuelos DO  December 22, 2021  9:52 AM

## 2021-12-22 NOTE — ANESTHESIA CARE TRANSFER NOTE
Patient: Alise Orona    Procedure: Procedure(s):  flexible bronchoscopy, carbon dioxide laser resection of stenosis, CRE balloon dilation  steroid injection       Diagnosis: Subglottic stenosis [J38.6]  Morbid obesity (H) [E66.01]  Diagnosis Additional Information: No value filed.    Anesthesia Type:   General     Note:    Oropharynx: oropharynx clear of all foreign objects and spontaneously breathing  Level of Consciousness: awake and drowsy  Oxygen Supplementation: face mask  Level of Supplemental Oxygen (L/min / FiO2): 6  Independent Airway: airway patency satisfactory and stable  Dentition: dentition unchanged  Vital Signs Stable: post-procedure vital signs reviewed and stable  Report to RN Given: handoff report given  Patient transferred to: Phase II  Comments: Given 50 mcg of fentanyl for pain relief while at bedside. Breathing without signs of obstruction. Good respiratory rate.   Handoff Report: Identifed the Patient, Identified the Reponsible Provider, Reviewed the pertinent medical history, Discussed the surgical course, Reviewed Intra-OP anesthesia mangement and issues during anesthesia, Set expectations for post-procedure period and Allowed opportunity for questions and acknowledgement of understanding      Vitals:  Vitals Value Taken Time   /66 12/22/21 0900   Temp     Pulse 102 12/22/21 0904   Resp 10 12/22/21 0904   SpO2 99 % 12/22/21 0904   Vitals shown include unvalidated device data.    Electronically Signed By: Franko Christy  December 22, 2021  9:05 AM

## 2021-12-22 NOTE — DISCHARGE INSTRUCTIONS
Same-Day Surgery   Adult Discharge Orders & Instructions     For 24 hours after surgery:  1. Get plenty of rest.  A responsible adult must stay with you for at least 24 hours after you leave the hospital.   2. Pain medication can slow your reflexes. Do not drive or use heavy equipment.  If you have weakness or tingling, don't drive or use heavy equipment until this feeling goes away.  3. Mixing alcohol and pain medication can cause dizziness and slow your breathing. It can even be fatal. Do not drink alcohol while taking pain medication.  4. Avoid strenuous or risky activities.  Ask for help when climbing stairs.   5. You may feel lightheaded.  If so, sit for a few minutes before standing.  Have someone help you get up.   6. If you have nausea (feel sick to your stomach), drink only clear liquids such as apple juice, ginger ale, broth or 7-Up.  Rest may also help.  Be sure to drink enough fluids.  Move to a regular diet as you feel able. Take pain medications with a small amount of solid food, such as toast or crackers, to avoid nausea.   7. A slight fever is normal. Call the doctor if your fever is over 100 F (37.7 C) (taken under the tongue) or lasts longer than 24 hours.  8. You may have a dry mouth, muscle aches, trouble sleeping or a sore throat.  These symptoms should go away after 24 hours.  9. Do not make important or legal decisions.   Pain Management:      1. Take pain medication (if prescribed) for pain as directed by your physician.        2. WARNING: If the pain medication you have been prescribed contains Tylenol  (acetaminophen), DO NOT take additional doses of Tylenol (acetaminophen).     Call your doctor for any of the followin.  Signs of infection (fever, growing tenderness at the surgery site, severe pain, a large amount of drainage or bleeding, foul-smelling drainage, redness, swelling).    2.  It has been over 8 to 10 hours since surgery and you are still not able to urinate (pee).    3.   Headache for over 24 hours.    4.  Numbness, tingling or weakness the day after surgery (if you had spinal anesthesia).  To contact a doctor, call _____________________________________ or:      [phone] and ask for the Resident On Call for:          __________________________________________ (answered 24 hours a day)      Emergency Department:  [place] [facility]: [phone]  [place] [facility]: [phone]               Rev. 10/2014

## 2021-12-22 NOTE — OR NURSING
Dr. Hoffmann in the PACU to say Hi to Alisa post procedure. There is a photo sheet for her to take home regarding the before and after of her procedure.

## 2021-12-22 NOTE — BRIEF OP NOTE
[facility]    Brief Operative Note    Pre-operative diagnosis: Subglottic stenosis [J38.6]  Morbid obesity (H) [E66.01]  Post-operative diagnosis Same as pre-operative diagnosis    Procedure: Procedure(s):  flexible bronchoscopy, carbon dioxide laser resection of stenosis, CRE balloon dilation  steroid injection  Surgeon: Surgeon(s) and Role:     * Nadine Hoffmann MD - Primary     * Angsu Burnette MD - Resident - Assisting  Anesthesia: General   Estimated Blood Loss: <5 ml    Drains: None  Specimens: * No specimens in log *  Findings:   See operative note for details   Complications: None   Implants: * No implants in log *

## 2021-12-22 NOTE — OP NOTE
Operative Note   Otolaryngology - Head and Neck Surgery       DATE OF OPERATION:   December 22, 2021    PREOPERATIVE DIAGNOSIS:   Subglottic stenosis.      POSTOPERATIVE DIAGNOSIS:   Same    NAME OF OPERATION:   1. Flexible bronchoscopy with CO2 laser of subglottic stenosis.   2. Flexible bronchoscopy with balloon dilation of subglottic stenosis to 12 mmHg   3. Flexible bronchoscopy with injection of kenalog steroid to subglottic larynx.    ANESTHESIA  Type: General  LMA    SURGEON:   Nadine Hoffmann MD    RESIDENT SURGEON(S):   Shoaib Burnette MD    INDICATIONS FOR PROCEDURE:   The patient is a 31 year old female with a history of subglottic stenosis. She is being brought to the Operating Room for subsequent treatment of the stenosis. She underwent flex bronch with CRE balloon dilation to 12mmHg on 11/5/19. Risks, benefits, alternatives, and rationale for the procedure(s) listed above were discussed with the patient, and the patient wishes to proceed with surgery and has signed an informed consent.     FINDINGS:   1. Entire procedure done via LMA with spontaneous ventilation  2. Subglottic stenosis: 1cm distal to the vocal folds, 2cm thick, grade 3, 80 % narrowing - it starts right above the cricoid and goes distal, the most narrow segment is distally  3. CRE balloon to 12 mmHg     DESCRIPTION OF PROCEDURE:   The patient was brought into the operating room and placed supine on the operating room table. A time-out was performed. General anesthesia was induced. Then the patient was ventilated with a LMA.     The head of the bed was turned 90 degrees to the right. A flexible bronchoscope was placed through the LMA. 4% LTA was infused over the glottic entry. Once the topical anesthesia had been placed, the flexible bronchoscope was placed through the vocal cords. The patient had evidence of approximately 80% subglottic and tracheal narrowing.      The procedure began with first performing CO2 laser. The flexible waveguide CO2  laser was threaded through the bronchoscope. A laser time-out was performed. The patient's face and eyes were protected with moist towels. The oxygen was subsequently reduced to less than 30%. Radial cuts were made within the scar band starting anteriorly through the scar bridge and then more cuts were made laterally and posteriorly.     The patient was then allowed to obtain adequate saturation and the balloon dilator was then placed through the flexible bronchoscope. Dilations were performed to 12 mmHg. Once the subglottic area had been adequately dilated, the patient was once again allowed to obtain 100% saturation.     Then 1.0 ml of kenalog 40 steroid  was injected into the scar via a flexible injector circumferentially on the right and left.     Hemostasis was confirmed. This was the end of our procedure. The instrumentation was carefully. The patient was then handed back to the care of anesthesia who awoke and extubated the patient without complication.    COMPLICATIONS:   None.  .   ESTIMATED BLOOD LOSS:   Less than 10cc    DISPOSITION:   PACU.    SPECIMENS:  * No specimens in log *       PHOTODOCUMENTATION:

## 2022-01-04 ENCOUNTER — DOCUMENTATION ONLY (OUTPATIENT)
Dept: OTOLARYNGOLOGY | Facility: CLINIC | Age: 32
End: 2022-01-04
Payer: COMMERCIAL

## 2022-01-04 NOTE — PROGRESS NOTES
FMLA paperwork faxed to [employer]. Fax number [phone]. 10 pgs faxed.  Left  message for patient advising of this also and asking if pt needs original paperwork back. Writers call back number provided on  for call back.

## 2022-01-18 ENCOUNTER — OFFICE VISIT (OUTPATIENT)
Dept: OTOLARYNGOLOGY | Facility: CLINIC | Age: 32
End: 2022-01-18
Payer: COMMERCIAL

## 2022-01-18 ENCOUNTER — VIRTUAL VISIT (OUTPATIENT)
Dept: OTOLARYNGOLOGY | Facility: CLINIC | Age: 32
End: 2022-01-18
Payer: COMMERCIAL

## 2022-01-18 VITALS
DIASTOLIC BLOOD PRESSURE: 66 MMHG | WEIGHT: 280 LBS | OXYGEN SATURATION: 97 % | BODY MASS INDEX: 46.65 KG/M2 | HEART RATE: 96 BPM | HEIGHT: 65 IN | SYSTOLIC BLOOD PRESSURE: 120 MMHG

## 2022-01-18 DIAGNOSIS — J38.6 SUBGLOTTIC STENOSIS: ICD-10-CM

## 2022-01-18 DIAGNOSIS — R49.0 DYSPHONIA: Primary | ICD-10-CM

## 2022-01-18 PROCEDURE — 31575 DIAGNOSTIC LARYNGOSCOPY: CPT | Performed by: OTOLARYNGOLOGY

## 2022-01-18 PROCEDURE — 92524 BEHAVRAL QUALIT ANALYS VOICE: CPT | Mod: GN | Performed by: SPEECH-LANGUAGE PATHOLOGIST

## 2022-01-18 PROCEDURE — 99214 OFFICE O/P EST MOD 30 MIN: CPT | Mod: 25 | Performed by: OTOLARYNGOLOGY

## 2022-01-18 ASSESSMENT — MIFFLIN-ST. JEOR: SCORE: 1980.95

## 2022-01-18 ASSESSMENT — PAIN SCALES - GENERAL: PAINLEVEL: NO PAIN (0)

## 2022-01-18 NOTE — NURSING NOTE
"Chief Complaint   Patient presents with     RECHECK     Follow up       Blood pressure 120/66, pulse 96, height 1.651 m (5' 5\"), weight 127 kg (280 lb), SpO2 97 %.    Diego Trujillo, EMT  "

## 2022-01-18 NOTE — PATIENT INSTRUCTIONS
1.  You were seen in the ENT Clinic today by . If you have any questions or concerns after your appointment, please call [phone]. Press option #1 for scheduling related needs. Press option #3 for Nurse advice.    2.   has recommended  the following:   - voice therapy   -cough/throat clearing suppression therapy    3.  Plan is to return to clinic in 3 months      Gloria Marte LPN  [phone]  [facility] - Otolaryngology

## 2022-01-18 NOTE — PROGRESS NOTES
[facility] VOICE [facility]  Bravo Perez Jr., M.D., F.A.C.S.  Elsie Noriega M.D., M.P.H.  Nadine Hoffmann M.D.  Teresa Abdul, Ph.D., CCC-SLP  Otis Ledezma, Ph.D., [facility]-SLP  Chrissy Coffey M.M. (voice), M.A., CCC-SLP  Noe Dubois M.M. (voice), MGLORIA., CCC-SLP  KRISSY Conde (voice), M.S., CCC-SLP    [facility]  VOICE/SPEECH/BREATHING THERAPY  CLINICAL FOLLOW-UP AND LARYNGEAL EXAMINATION REPORT - IN PERSON    Clinician: Chrissy Coffey M.M. (voice), M.A., CCC-SLP  Seen in conjunction with: Dr. Nadine Hoffmann  Referring physician:  No ref. provider found  Patient: Alise Orona  Date of Visit: 1/18/2022    HISTORY  PATIENT INFORMATION  Alise Orona was seen for follow-up in conjunction with a visit to Dr. Nadine Hoffmann today.  Please also refer to Dr. Hoffmann's dictation.  She was last seen on 12/22/21 for surgery to improve a Hx of subglottic stenosis.  Dr. Hoffmann has asked me to join to 1) assess her cough/throat clearing severity 2) determine if there is a need for speech therapy for muscle tension dysphonia.    PROGRESS SINCE LAST VISIT  Ms. Orona reports:    Experiences mucus production since her last procedure, but breathing continues to be acceptable    Having a difficult time coordinating breath flow with phonation.    PROGRESS ON LONG-TERM GOALS: acceptable for breathing; voice is experiencing a set back    INTERVAL HX:    1/18/2022 - author: Chrissy Coffey - Hx of subglottic stenosis.  Has been working with Dr. Hoffmann since 11/1/19; early in her Hx of subglottic stenosis likely resulting from a prior intubation and GERD.  She has continued treatment with Dr. Hoffmann to help improve her airway, but presents today with voice complaints.    PATIENT REPORTED MEASURES  Patient Supplied Answers To VHI Questionnaire  Voice Handicap Index (VHI-10) 1/2/2020   My voice makes it difficult for people to hear me 1   People have difficulty understanding me in a noisy room 1   My voice difficulties restrict my  "personal and social life.  0   I feel left out of conversations because of my voice 0   My voice problem causes me to lose income 0   I feel as though I have to strain to produce voice 0   The clarity of my voice is unpredictable 0   My voice problem upsets me 0   My voice makes me feel handicapped 0   People ask, \"What's wrong with your voice?\" 0   VHI-10 2       Patient Supplied Answers To CSI Questionnaire  Cough Severity Index (CSI) 1/17/2022   My cough is worse when I lie down 0   My coughing problem causes me to restrict my personal and social life 0   I tend to avoid places because of my cough problem 0   I feel embarrassed because of my coughing problem 2   People ask, ''What's wrong?'' because I cough a lot 1   I run out of air when I cough 0   My coughing problem affects my voice 0   My coughing problem limits my physical activity 0   My coughing problem upsets me 0   People ask me if I am sick because I cough a lot 0   CSI Score 3       Patient Supplied Answers To EAT Questionnaire  Eating Assessment Tool (EAT-10) 1/17/2022   My swallowing problem has caused me to lose weight 0   My swallowing problem interferes with my ability to go out for meals 0   Swallowing liquids takes extra effort 0   Swallowing solids takes extra effort 0   Swallowing pills takes extra effort 0   Swallowing is painful 0   The pleasure of eating is affected by my swallowing 0   When I swallow food sticks in my throat 0   I cough when I eat 0   Swallowing is stressful 0   EAT-10 0       OBJECTIVE FINDINGS  PERCEPTUAL EVALUATION (CPT 63978)  POSTURE / TENSION:     upper body    neck and shoulders    BREATHING:     Breathing is limited with her ongoing subglottic stenosis, but acceptable.     Respiration is not consistently coordinated with phonation    LARYNGEAL PALPATION: n/a    VOICE:    Roughness: Mild to moderate; intermittent in mid to upper range    Breathiness: WNL    Strain: mild to moderate intermittent in mid to upper " range    Loudness    Conversational speech:  WNL    Projected speech:  WNL    Pitch:    F0/ Habitual Pitch: WNL    High pitch demonstrated today: limited upper range    Pitch glide: neurologically normal, but mildly limited in upper range    Maximum Phonation Time: not captured due to time, but appeared WNL    Singing voice:   o Singing voice type:  - Identifies as: soprano    CAPE-V Overall Severity:  30/100    COUGH/THROAT CLEARING:    Not observed    LARYNGEAL FUNCTION STUDIES (CPT 83181)  n/a    LARYNGEAL EXAMINATION  Procedure: Flexible endoscopy with chip-tip technology with stroboscopy, right nostril; topical anesthesia with 3% Lidocaine and 0.25% phenylephrine was applied.   Performed by: Dr. Hoffmann  The laryngeal and pharyngeal structures were evaluated for gross appearance, mobility, function, and focal lesions / abnormalities of the associated mucosa.  Stroboscopy was warranted to evaluate closure, symmetry, and vibratory characteristics of the vocal folds.  All findings were within normal limits with the exception of the following salient features:   This exam shows:    Laryngeal and Vocal Fold Mucosa    Essentially healthy laryngeal mucosa    Posterior commissure hypertrophy    No remarkable signs of reflux    mild presence of thickened secretions on the vocal folds and throughout the laryngeal area    Status of vocal fold mucosa:   o Enlarged vessels run along the medial portion of the superior surface of the vocal folds bilaterally.    Neurological and Functional Integrity of the Larynx    Right vocal fold demonstrates sluggish mobility    Left vocal fold appears fully functional    Airway is limited, but adequate    Right arytenoid position is limited in mobility, left arytenoid cartilage appears fully functional    Supraglottic Function and Therapy Probes    moderate anterior-posterior constriction of the supraglottic larynx during connected speech    THERAPY PROBES: Improvement was elicited with use  of forward resonant stimuli, coordination of respiration and phonation and use of yawn sigh    The addition of stroboscopy allowed evaluation of the mucosal wave: Was not completed today    The laryngeal exam was reviewed with Ms. Orona, and I provided pertinent explanations, as well as written and oral information.    IMPRESSIONS/ RECOMMENDATIONS/ PLAN  IMPRESSIONS: Alise Orona is a 31-year-old, presenting today for follow-up with Dysphonia (R49.0) and an imbalance and respiratory mechanics in the context of Subglottic Stenosis (J38.6).   Remarkable findings included:    Perceptual evaluation demonstrated: Intermittent mild to moderate roughness and strain in mid to upper range.    Laryngeal exam demonstrated: Sluggish movement of the right vocal fold.  Stenosis remains present     Primary complaint of patient today included: Dysphonia, especially with her singing voice  Therefore, we recommended a course of speech therapy to address these concerns.    STIMULABILITY: results of therapy probes during perceptual and laryngeal evaluation demonstrate improvement with  use of forward resonant stimuli, coordination of respiration and phonation and use of yawn sigh    RECOMMENDATIONS:     DURATION / FREQUENCY: 3 biweekly one-hour sessions.  A total of 6-8 sessions may be necessary.    She demonstrates a Good prognosis for improvement given adherence to therapeutic recommendations. Therapeutic     Positive indicators: positive response to therapy probes diagnosis is known to respond to treatment high level of comittment    Negative indicators: NONE    Research: This patient is not a candidate     GOALS:  Patient goal:   1. To improve and maintain a healthy voice quality  2. To understand the problem and fix it as much as possible  3. To have a normal and acceptable voice quality  4. To breathe normally and comfortably in all situations    Long-term goal(s): In 6 months, Ms. Orona will:  1. Report resolution of  dysphonia, and use of optimal voice quality to meet personal, social, and professional needs, 90% of the time during a typical week of vocal activities    This treatment plan was developed with the patient who agreed with the recommendations.    TOTAL SERVICE TIME: 60 minutes  EVALUATION OF VOICE AND RESONANCE (45796)  NO CHARGE FACILITY FEE (59295)    Chrissy Coffey M.M. (voice), MRodyA., CCC/SLP  Speech-Language Pathologist  Certificate of Vocology  [facility] Voice [facility]  [phone]  [email or web address]  Pronouns: she/her

## 2022-01-18 NOTE — LETTER
1/18/2022       RE: Alise Orona  [address] [address]     Dear Colleague,    Thank you for referring your patient, Alise Orona, to the [facility] VOICE CLINIC [place] at [facility]. Please see a copy of my visit note below.    [facility]  Bravo Perez Jr., M.D., F.A.C.S.  Elsie Noriega M.D., M.P.H.  Nadine Hoffmann M.D.  Teresa Abdul, Ph.D., CCC-SLP  Otis Ledezma, Ph.D., CCC-SLP  Chrissy Coffey M.M. (voice), MGLORIA., CCC-SLP  Noe Dubois M.M. (voice), MGLORIA., CCC-SLP  KRISSY Conde (voice), M.S., CCC-SLP    [facility]  VOICE/SPEECH/BREATHING THERAPY  CLINICAL FOLLOW-UP AND LARYNGEAL EXAMINATION REPORT - IN PERSON    Clinician: Chrissy Coffey M.M. (voice), M.KORI, CCC-SLP  Seen in conjunction with: Dr. Nadine Hoffmann  Referring physician:  No ref. provider found  Patient: Alise Orona  Date of Visit: 1/18/2022    HISTORY  PATIENT INFORMATION  Alise Orona was seen for follow-up in conjunction with a visit to Dr. Nadine Hoffmann today.  Please also refer to Dr. Hoffmann's dictation.  She was last seen on 12/22/21 for surgery to improve a Hx of subglottic stenosis.  Dr. Hoffmann has asked me to join to 1) assess her cough/throat clearing severity 2) determine if there is a need for speech therapy for muscle tension dysphonia.    PROGRESS SINCE LAST VISIT  Ms. Orona reports:    Experiences mucus production since her last procedure, but breathing continues to be acceptable    Having a difficult time coordinating breath flow with phonation.    PROGRESS ON LONG-TERM GOALS: acceptable for breathing; voice is experiencing a set back    INTERVAL HX:    1/18/2022 - author: Chrissy Coffey - Hx of subglottic stenosis.  Has been working with Dr. Hoffmann since 11/1/19; early in her Hx of subglottic stenosis likely resulting from a prior intubation and GERD.  She has continued treatment with Dr. Hoffmann to  "help improve her airway, but presents today with voice complaints.    PATIENT REPORTED MEASURES  Patient Supplied Answers To VHI Questionnaire  Voice Handicap Index (VHI-10) 1/2/2020   My voice makes it difficult for people to hear me 1   People have difficulty understanding me in a noisy room 1   My voice difficulties restrict my personal and social life.  0   I feel left out of conversations because of my voice 0   My voice problem causes me to lose income 0   I feel as though I have to strain to produce voice 0   The clarity of my voice is unpredictable 0   My voice problem upsets me 0   My voice makes me feel handicapped 0   People ask, \"What's wrong with your voice?\" 0   VHI-10 2       Patient Supplied Answers To CSI Questionnaire  Cough Severity Index (CSI) 1/17/2022   My cough is worse when I lie down 0   My coughing problem causes me to restrict my personal and social life 0   I tend to avoid places because of my cough problem 0   I feel embarrassed because of my coughing problem 2   People ask, ''What's wrong?'' because I cough a lot 1   I run out of air when I cough 0   My coughing problem affects my voice 0   My coughing problem limits my physical activity 0   My coughing problem upsets me 0   People ask me if I am sick because I cough a lot 0   CSI Score 3       Patient Supplied Answers To EAT Questionnaire  Eating Assessment Tool (EAT-10) 1/17/2022   My swallowing problem has caused me to lose weight 0   My swallowing problem interferes with my ability to go out for meals 0   Swallowing liquids takes extra effort 0   Swallowing solids takes extra effort 0   Swallowing pills takes extra effort 0   Swallowing is painful 0   The pleasure of eating is affected by my swallowing 0   When I swallow food sticks in my throat 0   I cough when I eat 0   Swallowing is stressful 0   EAT-10 0       OBJECTIVE FINDINGS  PERCEPTUAL EVALUATION (CPT 30949)  POSTURE / TENSION:     upper body    neck and " shoulders    BREATHING:     Breathing is limited with her ongoing subglottic stenosis, but acceptable.     Respiration is not consistently coordinated with phonation    LARYNGEAL PALPATION: n/a    VOICE:    Roughness: Mild to moderate; intermittent in mid to upper range    Breathiness: WNL    Strain: mild to moderate intermittent in mid to upper range    Loudness    Conversational speech:  WNL    Projected speech:  WNL    Pitch:    F0/ Habitual Pitch: WNL    High pitch demonstrated today: limited upper range    Pitch glide: neurologically normal, but mildly limited in upper range    Maximum Phonation Time: not captured due to time, but appeared WNL    Singing voice:   o Singing voice type:  - Identifies as: soprano    CAPE-V Overall Severity:  30/100    COUGH/THROAT CLEARING:    Not observed    LARYNGEAL FUNCTION STUDIES (CPT 73711)  n/a    LARYNGEAL EXAMINATION  Procedure: Flexible endoscopy with chip-tip technology with stroboscopy, right nostril; topical anesthesia with 3% Lidocaine and 0.25% phenylephrine was applied.   Performed by: Dr. Hoffmann  The laryngeal and pharyngeal structures were evaluated for gross appearance, mobility, function, and focal lesions / abnormalities of the associated mucosa.  Stroboscopy was warranted to evaluate closure, symmetry, and vibratory characteristics of the vocal folds.  All findings were within normal limits with the exception of the following salient features:   This exam shows:    Laryngeal and Vocal Fold Mucosa    Essentially healthy laryngeal mucosa    Posterior commissure hypertrophy    No remarkable signs of reflux    mild presence of thickened secretions on the vocal folds and throughout the laryngeal area    Status of vocal fold mucosa:   o Enlarged vessels run along the medial portion of the superior surface of the vocal folds bilaterally.    Neurological and Functional Integrity of the Larynx    Right vocal fold demonstrates sluggish mobility    Left vocal fold  appears fully functional    Airway is limited, but adequate    Right arytenoid position is limited in mobility, left arytenoid cartilage appears fully functional    Supraglottic Function and Therapy Probes    moderate anterior-posterior constriction of the supraglottic larynx during connected speech    THERAPY PROBES: Improvement was elicited with use of forward resonant stimuli, coordination of respiration and phonation and use of yawn sigh    The addition of stroboscopy allowed evaluation of the mucosal wave: Was not completed today    The laryngeal exam was reviewed with MsRody Adwoa, and I provided pertinent explanations, as well as written and oral information.    IMPRESSIONS/ RECOMMENDATIONS/ PLAN  IMPRESSIONS: Alise Orona is a 31-year-old, presenting today for follow-up with Dysphonia (R49.0) and an imbalance and respiratory mechanics in the context of Subglottic Stenosis (J38.6).   Remarkable findings included:    Perceptual evaluation demonstrated: Intermittent mild to moderate roughness and strain in mid to upper range.    Laryngeal exam demonstrated: Sluggish movement of the right vocal fold.  Stenosis remains present     Primary complaint of patient today included: Dysphonia, especially with her singing voice  Therefore, we recommended a course of speech therapy to address these concerns.    STIMULABILITY: results of therapy probes during perceptual and laryngeal evaluation demonstrate improvement with  use of forward resonant stimuli, coordination of respiration and phonation and use of yawn sigh    RECOMMENDATIONS:     DURATION / FREQUENCY: 3 biweekly one-hour sessions.  A total of 6-8 sessions may be necessary.    She demonstrates a Good prognosis for improvement given adherence to therapeutic recommendations. Therapeutic     Positive indicators: positive response to therapy probes diagnosis is known to respond to treatment high level of comittment    Negative indicators: NONE    Research: This  patient is not a candidate     GOALS:  Patient goal:   1. To improve and maintain a healthy voice quality  2. To understand the problem and fix it as much as possible  3. To have a normal and acceptable voice quality  4. To breathe normally and comfortably in all situations    Long-term goal(s): In 6 months, Ms. Orona will:  1. Report resolution of dysphonia, and use of optimal voice quality to meet personal, social, and professional needs, 90% of the time during a typical week of vocal activities    This treatment plan was developed with the patient who agreed with the recommendations.    TOTAL SERVICE TIME: 60 minutes  EVALUATION OF VOICE AND RESONANCE (28005)  NO CHARGE FACILITY FEE (33971)    Chrissy Coffey M.M. (voice), M.A., CCC/SLP  Speech-Language Pathologist  Certificate of Vocology  [facility] Voice [facility]  [phone]  [email or web address]  Pronouns: she/her

## 2022-01-18 NOTE — PROGRESS NOTES
[facility]   at the [facility]   Otolaryngology Clinic     Patient: Alise Orona    MRN: [id number]    : 1990    Age/Gender: 31 year old female  Date of Service: 2022  Rendering Provider:   Nadine Hoffmann MD     Chief Complaint   Subglottic stenosis  S/p MDL with CO2 laser resection, dilation and steroid injection on 19 and 2021  Interval History   HISTORY OF PRESENT ILLNESS: Ms. 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 She was initially seen on 2019 before her surgery. Please refer to 2021 note for full history.     Today, she presents for follow up. she reports:  - peak flow meter 375  - still has some mucus production and throat clearing  - certainly not anything like it was before  - breathing has been good  - would like referral to [facility] for her voice  - feels like she does not know how to breathe when she is talking   - reached out to [employer] [email or web address] but has not heard back yet   - works an active job and walks 15,000 steps a day  - HgA1C dropped down a couple of points  - talked to  about injections      PAST MEDICAL HISTORY:   Past Medical History:   Diagnosis Date     Attention deficit hyperactivity disorder (ADHD), predominantly inattentive type 2015     Binge-eating disorder, mild 2015     Family history of thyroid cancer 2014     Generalized anxiety disorder 11/15/2013     Major depressive disorder, recurrent episode, in full remission (H) 2014     Menorrhagia 10/2/2012     Obesity 2014     Ovarian cyst 10/2/2012     Screening for malignant neoplasm of cervix 2014-2012 NILM  NILM, hpv negative  NILM     27 y.o. Plan:   Cotesting 2020    BV  ; Pap test history     Subglottic stenosis 2019    Added automatically from request for surgery [id number]        PAST SURGICAL HISTORY:   Past Surgical History:   Procedure Laterality Date     GYN SURGERY      lap removal of ovarian cyst      INJECT STEROID (LOCATION) N/A 11/5/2019    Procedure: INJECTION, STEROID;  Surgeon: Nadine Ayers MD;  Location: [facility] OR     INJECT STEROID (LOCATION) N/A 12/22/2021    Procedure: steroid injection;  Surgeon: Nadine Hoffmann MD;  Location: [facility] OR     LASER CO2 LARYNGOSCOPY N/A 12/22/2021    Procedure: flexible bronchoscopy, carbon dioxide laser resection of stenosis, CRE balloon dilation;  Surgeon: Nadine Hoffmann MD;  Location: [facility] OR     LASER CO2 LARYNGOSCOPY, COMPLEX N/A 11/5/2019    Procedure: microdirect laryngoscopy, flexible bronchoscopy, flexible CO2 laser laryngoscopy with excision of stenosis, laryngoscopy with dilation, flexible esophagoscopy;  Surgeon: Nadine Ayers MD;  Location: [facility] OR     WISDOM TEETH EXTRACTION         CURRENT MEDICATIONS:   Current Outpatient Medications:      acetaminophen (TYLENOL) 325 MG tablet, Take 2 tablets (650 mg) by mouth every 4 hours as needed for mild pain, Disp: 50 tablet, Rfl: 0     acetaminophen (TYLENOL) 325 MG tablet, Take 2 tablets (650 mg) by mouth every 4 hours as needed for mild pain, Disp: 50 tablet, Rfl: 0     buPROPion (WELLBUTRIN XL) 300 MG 24 hr tablet, Take 300 mg by mouth every morning , Disp: , Rfl:      busPIRone (BUSPAR) 10 MG tablet, Take by mouth 2 times daily , Disp: , Rfl:      busPIRone (BUSPAR) 5 MG tablet, Take one (1) tablet by mouth every morning AND two (2) tablets at bedtime, Disp: , Rfl:      clonazePAM (KLONOPIN) 0.5 MG tablet, Take one half (0.5) tablets by mouth twice a day AND two (2) tablets at bedtime, Disp: , Rfl:      clonazePAM (KLONOPIN) 1 MG tablet, Take 1 mg by mouth nightly as needed, Disp: , Rfl:      Escitalopram Oxalate (LEXAPRO PO), Take 20 mg by mouth daily, Disp: , Rfl:      fish oil-omega-3 fatty acids 1000 MG capsule, Take 2 g by mouth daily, Disp: , Rfl:      fluticasone (FLOVENT HFA)  220 MCG/ACT inhaler, Inhale 2 puffs into the lungs 2 times daily, Disp: 12 g, Rfl: 0     magnesium gluconate (MAGONATE) 250 MG tablet, Take 400 mg by mouth daily, Disp: , Rfl:      multivitamin w/minerals (MULTI-VITAMIN) tablet, Take 1 tablet by mouth daily, Disp: , Rfl:      norgestimate-ethinyl estradiol (ORTHO-CYCLEN, SPRINTEC) 0.25-35 MG-MCG tablet, Take 1 tablet by mouth daily, Disp: , Rfl:      omeprazole (PRILOSEC) 40 MG DR capsule, Take 1 capsule (40 mg) by mouth daily before breakfast, Disp: 90 capsule, Rfl: 3     ondansetron (ZOFRAN-ODT) 4 MG ODT tab, Take 1-2 tablets (4-8 mg) by mouth every 8 hours as needed for nausea, Disp: 4 tablet, Rfl: 0     oxyCODONE (ROXICODONE) 5 MG tablet, Take 1 tablet (5 mg) by mouth every 6 hours as needed for severe pain, Disp: 6 tablet, Rfl: 0     sulfamethoxazole-trimethoprim (BACTRIM DS) 800-160 MG tablet, Take 1 tablet by mouth 2 times daily, Disp: 14 tablet, Rfl: 0     sulfamethoxazole-trimethoprim (BACTRIM DS) 800-160 MG tablet, Take 1 tablet by mouth 2 times daily, Disp: 20 tablet, Rfl: 0     traZODone (DESYREL) 50 MG tablet, Take 0.5 to 3 tabs 30-60 minutes prior to sleep as needed for insomnia, Disp: , Rfl:      TRAZODONE HCL PO, Take 50 mg by mouth At Bedtime, Disp: , Rfl:      VYVANSE 10 MG capsule, Take one (1) capsule by mouth every morning, Disp: , Rfl:     ALLERGIES: Patient has no known allergies.    SOCIAL HISTORY:    Social History     Socioeconomic History     Marital status: Single     Spouse name: Not on file     Number of children: Not on file     Years of education: Not on file     Highest education level: Not on file   Occupational History     Not on file   Tobacco Use     Smoking status: Never Smoker     Smokeless tobacco: Never Used   Substance and Sexual Activity     Alcohol use: Yes     Comment: occas     Drug use: Never     Sexual activity: Not on file   Other Topics Concern     Parent/sibling w/ CABG, MI or angioplasty before 65F 55M? Not Asked    Social History Narrative     Not on file     Social Determinants of Health     Financial Resource Strain: Not on file   Food Insecurity: Not on file   Transportation Needs: Not on file   Physical Activity: Not on file   Stress: Not on file   Social Connections: Not on file   Intimate Partner Violence: Not on file   Housing Stability: Not on file         FAMILY HISTORY:   Family History   Problem Relation Age of Onset     Depression Mother      Substance Abuse Father      Depression Maternal Grandmother      Substance Abuse Sister      Depression Other      Bipolar Disorder Other      Anxiety Disorder Other      Schizophrenia Other      Substance Abuse Other       Non-contributory for problems with anesthesia    REVIEW OF SYSTEMS:   The patient was asked a 14 point review of systems regarding constitutional symptoms, eye symptoms, ears, nose, mouth, throat symptoms, cardiovascular symptoms, respiratory symptoms, gastrointestinal symptoms, genitourinary symptoms, musculoskeletal symptoms, integumentary symptoms, neurological symptoms, psychiatric symptoms, endocrine symptoms, hematologic/lymphatic symptoms, and allergic/ immunologic symptoms.   The pertinent factors have been included in the HPI and below.  Patient Supplied Answers to Review of Systems  [facility] ROS 1/17/2022   Psychology -   Ears, Nose, Throat Nasal congestion or drainage   Cardiopulmonary -       Physical Examination   The patient underwent a physical examination as described below. The pertinent positive and negative findings are summarized after the description of the examination.  Constitutional: The patient's developmental and nutritional status was assessed. The patient's voice quality was assessed.  Head and Face: The head and face were inspected for deformities. The sinuses were palpated. The salivary glands were palpated. Facial muscle strength was assessed bilaterally.  Eyes: Extraocular movements and primary gaze alignment were  assessed.  Ears, Nose, Mouth and Throat: The ears and nose were examined for deformities. The nasal septum, mucosa, and turbinates were inspected by anterior rhinoscopy. The lips, teeth, and gums were examined for abnormalities. The oral mucosa, tongue, palate, tonsils, lateral and posterior pharynx were inspected for the presence of asymmetry or mucosal lesions.    Neck: The tracheal position was noted, and the neck mass palpated to determine if there were any asymmetries, abnormal neck masses, thyromegally, or thyroid nodules.  Respiratory: The nature of the breathing and chest expansion/symmetry was observed.  Cardiovascular: The patient was examined to determine the presence of any edema or jugular venous distension.  Abdomen: The contour of the abdomen was noted.  Lymphatic: The patient was examined for infraclavicular lymphadenopathy.  Musculoskeletal: The patient was inspected for the presence of skeletal deformities.  Extremities: The extremities were examined for any clubbing or cyanosis.  Skin: The skin was examined for inflammatory or neoplastic conditions.  Neurologic: The patient's orientation, mood, and affect were noted. The cranial nerve  functions were examined.  Other pertinent positive and negative findings on physical examination:   OC/OP: no lesions, uvula midline, soft palate elevates symmetrically  Neck: no lesions     All other physical examination findings were within normal limits and noncontributory.    Procedures   Flexible laryngoscopy (CPT 87083)      Pre-procedure diagnosis: subglottic stenosis  Post-procedure diagnosis: same as above  Indication for procedure: Ms. Orona is a 31 year old female with see above  Procedure(s): Fiberoptic Laryngoscopy    Details of Procedure: After informed consent was obtained, the patient was seated in the examination chair.  The areas of the nasopharynx as well as the hypopharynx were anesthetized with topical 4% lidocaine with 0.25% phenylephrine  atomizer.  Examination of the base of tongue was performed first.  Attention was directed to any evidence of masses in the area or evidence of leukoplakia or candidal infection.  Attention was directed to the epiglottis where its size and position was determined and its movement on phonation of the vowel  e .  The piriform sinuses were then inspected for any mass lesions or pooling of secretions.  Attention was then directed to the larynx. The vocal folds were inspected for infection or any areas of leukoplakia, for masses, polypoid degeneration, or hemorrhage.  Having done this, the arytenoids and vocal processes were inspected for erythema or evidence of granuloma formation.  The posterior commissure was then inspected for evidence of inflammatory changes in the mucosa and heaping up of mucosal tissue. The patient was then instructed to say the vowel  e .  Adduction of vocal folds to the midline was observed for any evidence of paresis or paralysis of the larynx or asymmetry in rotation of the larynx to the left or right. The patient was asked to breathe and the degree of abduction was noted bilaterally.  Subglottic view of the larynx was obtained for any additional mass lesions or mucosal changes.  Finally the post cricoid was examined for evidence of pooling of secretions, as well as the pharyngeal wall mucosa.   Anesthesia type: 0.25% phenylephrine    Findings:  Anatomic/physiological deviations: RNC, subglottic stenosis improved, grade 1, 20% narrowing, some mild white granulation anteriorly, right vocal fold slight paresis    Right vocal process: Little restriction of mobility   Left vocal process: No restriction of mobility  Glottal gap: Complete glottal closure  Supraglottic structures: Normal  Hypopharynx: Normal     Estimated Blood Loss: minimal  Complications: None  Disposition: Patient tolerated the procedure well                    Review of Relevant Clinical Data   I personally reviewed:  Labs:  No  "results found for: TSH  No results found for: NA, CO2, BUN, CREAT, GLUCOSE, PHOS  No results found for: WBC, HGB, HCT, MCV, PLT  No results found for: PT, PTT, INR  No results found for: TERI  No components found for: RHEUMATOIDFACTOR,  RF  No results found for: CRP  No components found for: CKTOT, URICACID  No components found for: C3, C4, DSDNAAB, NDNAABIFA  No results found for: MPOAB     Patient reported Quality of Life (QOL) Measures   Patient Supplied Answers To VHI Questionnaire  Voice Handicap Index (VHI-10) 1/2/2020   My voice makes it difficult for people to hear me 1   People have difficulty understanding me in a noisy room 1   My voice difficulties restrict my personal and social life.  0   I feel left out of conversations because of my voice 0   My voice problem causes me to lose income 0   I feel as though I have to strain to produce voice 0   The clarity of my voice is unpredictable 0   My voice problem upsets me 0   My voice makes me feel handicapped 0   People ask, \"What's wrong with your voice?\" 0   VHI-10 2         Patient Supplied Answers To EAT Questionnaire  Eating Assessment Tool (EAT-10) 1/17/2022   My swallowing problem has caused me to lose weight 0   My swallowing problem interferes with my ability to go out for meals 0   Swallowing liquids takes extra effort 0   Swallowing solids takes extra effort 0   Swallowing pills takes extra effort 0   Swallowing is painful 0   The pleasure of eating is affected by my swallowing 0   When I swallow food sticks in my throat 0   I cough when I eat 0   Swallowing is stressful 0   EAT-10 0         Patient Supplied Answers To CSI Questionnaire  Cough Severity Index (CSI) 1/17/2022   My cough is worse when I lie down 0   My coughing problem causes me to restrict my personal and social life 0   I tend to avoid places because of my cough problem 0   I feel embarrassed because of my coughing problem 2   People ask, ''What's wrong?'' because I cough a lot 1   I " run out of air when I cough 0   My coughing problem affects my voice 0   My coughing problem limits my physical activity 0   My coughing problem upsets me 0   People ask me if I am sick because I cough a lot 0   CSI Score 3         Patient Supplied Answers to Dyspnea Index Questionnaire:  Dyspnea Index 2022   1. I have trouble getting air in. 2   2. I feel tightness in my throat when I am having angel breathing problem. 1   3. It takes more effort to breathe than it used to. 1   4. Change in weather affect my breathing problem. 3   5. My breathing gets worse with stress. 1   6. I make sound/noise breathing in 2   7. I have to strain to breathe. 1   8. My shortness of breath gets worse with exercise or physical activity 2   9. My breathing problem makes me feel stressed. 2   10. My breathing problem casuses me to restrict my personal and social life. 0   Dyspnea Index Total Score 15       Impression & Plan     IMPRESSION: Ms. Orona is a 31 year old female who is being seen for the followin.    Dyspnea  - due to subglottic stenosis   - intubation history possible in  due to an ovarian cyst - not sure  - diabetes - HgA1c 21 - 5.7   - autoimmune labs - from 10/21/19 - TERI, RF, ANCA - negative  - biopsy none  - imaging none  - patient symptoms with dyspnea  - scope showed grade 3 subglottic stenosis on 19  - discussed etiology of trauma (intubation), autoimmune, diabetes or idiopathic. In this case this is most likely DM and prior intubation induced  - discussed treatment with observation, conservative management with oral abx/steroids/reflux precautions, steroid injections, endoscopic procedures, open resection and bypass procedure with tracheotomy  - patient elects to proceed with endoscopic surgery  - s/p  MDL with CO2 laser resection, dilation and steroid injection on 19  - symptoms have come back for the past 6 months (reported today 21)  - scope today shows grade 3, 80% narrowing  subglottically  - peak flow meter today is 200  - discussed she needs another dilation after which we will discuss long term management again to establish goals and a follow up plan  - discussed given her severity of stenosis as well as her weight, her surgery is high risk and she is at risk of a trach   - s/p flex bronch with CO2 laser and dilation to 12 mmHg 12/22/2021  - symptoms today 01/18/2022 are improved with better breathing, peak flow meter is 375  - scope today 01/18/2022 shows subglottic stenosis improved, grade 1, 20% narrowing, some mild white granulation anteriorly, right vocal fold slight paresis, minimal restriction on the right   - discussed weight management and diabetic control   - discussed steroid injection, risks, benefits and alternatives were discussed  Plan  - schedule steroid injection, if covered by insurance, reached out to [facility] for CPT code  - referral for weight management to [facility] team  - management of HgA1c  - monthly peak flow meters      2. Dysphonia  - feels like she does not know how to breathe when she is talking   - had voice complaints in 2019 when she was first seen  - strobe at the time showed supraglottic hyperfunction, asymmetric vocal fold vibration and thickening   - scope today shows supraglottic hyperfunction and right vocal fold paresis   - will need close observation for the paresis    Plan  - voice therapy    - cough/throat clearing suppression therapy  - follow up in 3 months to assess vocal fold mobility           RETURN VISIT: 3 months     Scribe Disclosure:  I, Heather Mckinney am serving as a scribe to document services personally performed by Nadine Hoffmann MD at this visit, based upon the provider's statements to me. All documentation has been reviewed by the aforementioned provider prior to being entered into the official medical record.     Nadine Hoffmann MD    Laryngology    [facility]  Department of  Otolaryngology -  Head and Neck Surgery  Clinics & Surgery Center  [address] [place] [id number]  Appointment line: [phone]  Fax: [phone]  [email or web address]

## 2022-01-18 NOTE — LETTER
2022       RE: Alise Orona  [address] [place] [id number]     Dear Colleague,    Thank you for referring your patient, Alise Orona, to the [facility] EAR NOSE AND THROAT CLINIC [place] at [facility]. Please see a copy of my visit note below.        [facility]   at the [facility]   Otolaryngology Clinic     Patient: Alise Orona    MRN: [id number]    : 1990    Age/Gender: 31 year old female  Date of Service: 2022  Rendering Provider:   Nadine Hoffmann MD     Chief Complaint   Subglottic stenosis  S/p MDL with CO2 laser resection, dilation and steroid injection on 19 and 2021  Interval History   HISTORY OF PRESENT ILLNESS: Ms. 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 She was initially seen on 2019 before her surgery. Please refer to 2021 note for full history.     Today, she presents for follow up. she reports:  - peak flow meter 375  - still has some mucus production and throat clearing  - certainly not anything like it was before  - breathing has been good  - would like referral to SLP for her voice  - feels like she does not know how to breathe when she is talking   - reached out to [facility] bariatric but has not heard back yet   - works an active job and walks 15,000 steps a day  - HgA1C dropped down a couple of points  - talked to  about injections      PAST MEDICAL HISTORY:   Past Medical History:   Diagnosis Date     Attention deficit hyperactivity disorder (ADHD), predominantly inattentive type 2015     Binge-eating disorder, mild 2015     Family history of thyroid cancer 2014     Generalized anxiety disorder 11/15/2013     Major depressive disorder, recurrent episode, in full remission  (H) 4/16/2014     Menorrhagia 10/2/2012     Obesity 12/8/2014     Ovarian cyst 10/2/2012     Screening for malignant neoplasm of cervix 12/22/2014 2010-2012 NILM 2014 NILM, hpv negative 2017 NILM     27 y.o. Plan:   Cotesting 5/2020    BV  ; Pap test history     Subglottic stenosis 11/1/2019    Added automatically from request for surgery [id number]       PAST SURGICAL HISTORY:   Past Surgical History:   Procedure Laterality Date     GYN SURGERY      lap removal of ovarian cyst      INJECT STEROID (LOCATION) N/A 11/5/2019    Procedure: INJECTION, STEROID;  Surgeon: Nadine Ayers MD;  Location: [facility] OR     INJECT STEROID (LOCATION) N/A 12/22/2021    Procedure: steroid injection;  Surgeon: Nadine Hoffmann MD;  Location: [facility] OR     LASER CO2 LARYNGOSCOPY N/A 12/22/2021    Procedure: flexible bronchoscopy, carbon dioxide laser resection of stenosis, CRE balloon dilation;  Surgeon: Nadine Hoffmann MD;  Location: [facility] OR     LASER CO2 LARYNGOSCOPY, COMPLEX N/A 11/5/2019    Procedure: microdirect laryngoscopy, flexible bronchoscopy, flexible CO2 laser laryngoscopy with excision of stenosis, laryngoscopy with dilation, flexible esophagoscopy;  Surgeon: Nadine Ayers MD;  Location: [facility] OR     WISDOM TEETH EXTRACTION         CURRENT MEDICATIONS:   Current Outpatient Medications:      acetaminophen (TYLENOL) 325 MG tablet, Take 2 tablets (650 mg) by mouth every 4 hours as needed for mild pain, Disp: 50 tablet, Rfl: 0     acetaminophen (TYLENOL) 325 MG tablet, Take 2 tablets (650 mg) by mouth every 4 hours as needed for mild pain, Disp: 50 tablet, Rfl: 0     buPROPion (WELLBUTRIN XL) 300 MG 24 hr tablet, Take 300 mg by mouth every morning , Disp: , Rfl:      busPIRone (BUSPAR) 10 MG tablet, Take by mouth 2 times daily , Disp: , Rfl:      busPIRone (BUSPAR) 5 MG tablet, Take one (1) tablet by mouth every morning AND two (2) tablets at bedtime, Disp: , Rfl:      clonazePAM (KLONOPIN) 0.5 MG tablet, Take one half (0.5) tablets by  mouth twice a day AND two (2) tablets at bedtime, Disp: , Rfl:      clonazePAM (KLONOPIN) 1 MG tablet, Take 1 mg by mouth nightly as needed, Disp: , Rfl:      Escitalopram Oxalate (LEXAPRO PO), Take 20 mg by mouth daily, Disp: , Rfl:      fish oil-omega-3 fatty acids 1000 MG capsule, Take 2 g by mouth daily, Disp: , Rfl:      fluticasone (FLOVENT HFA) 220 MCG/ACT inhaler, Inhale 2 puffs into the lungs 2 times daily, Disp: 12 g, Rfl: 0     magnesium gluconate (MAGONATE) 250 MG tablet, Take 400 mg by mouth daily, Disp: , Rfl:      multivitamin w/minerals (MULTI-VITAMIN) tablet, Take 1 tablet by mouth daily, Disp: , Rfl:      norgestimate-ethinyl estradiol (ORTHO-CYCLEN, SPRINTEC) 0.25-35 MG-MCG tablet, Take 1 tablet by mouth daily, Disp: , Rfl:      omeprazole (PRILOSEC) 40 MG DR capsule, Take 1 capsule (40 mg) by mouth daily before breakfast, Disp: 90 capsule, Rfl: 3     ondansetron (ZOFRAN-ODT) 4 MG ODT tab, Take 1-2 tablets (4-8 mg) by mouth every 8 hours as needed for nausea, Disp: 4 tablet, Rfl: 0     oxyCODONE (ROXICODONE) 5 MG tablet, Take 1 tablet (5 mg) by mouth every 6 hours as needed for severe pain, Disp: 6 tablet, Rfl: 0     sulfamethoxazole-trimethoprim (BACTRIM DS) 800-160 MG tablet, Take 1 tablet by mouth 2 times daily, Disp: 14 tablet, Rfl: 0     sulfamethoxazole-trimethoprim (BACTRIM DS) 800-160 MG tablet, Take 1 tablet by mouth 2 times daily, Disp: 20 tablet, Rfl: 0     traZODone (DESYREL) 50 MG tablet, Take 0.5 to 3 tabs 30-60 minutes prior to sleep as needed for insomnia, Disp: , Rfl:      TRAZODONE HCL PO, Take 50 mg by mouth At Bedtime, Disp: , Rfl:      VYVANSE 10 MG capsule, Take one (1) capsule by mouth every morning, Disp: , Rfl:     ALLERGIES: Patient has no known allergies.    SOCIAL HISTORY:    Social History     Socioeconomic History     Marital status: Single     Spouse name: Not on file     Number of children: Not on file     Years of education: Not on file     Highest education level:  Not on file   Occupational History     Not on file   Tobacco Use     Smoking status: Never Smoker     Smokeless tobacco: Never Used   Substance and Sexual Activity     Alcohol use: Yes     Comment: occas     Drug use: Never     Sexual activity: Not on file   Other Topics Concern     Parent/sibling w/ CABG, MI or angioplasty before 65F 55M? Not Asked   Social History Narrative     Not on file     Social Determinants of Health     Financial Resource Strain: Not on file   Food Insecurity: Not on file   Transportation Needs: Not on file   Physical Activity: Not on file   Stress: Not on file   Social Connections: Not on file   Intimate Partner Violence: Not on file   Housing Stability: Not on file         FAMILY HISTORY:   Family History   Problem Relation Age of Onset     Depression Mother      Substance Abuse Father      Depression Maternal Grandmother      Substance Abuse Sister      Depression Other      Bipolar Disorder Other      Anxiety Disorder Other      Schizophrenia Other      Substance Abuse Other       Non-contributory for problems with anesthesia    REVIEW OF SYSTEMS:   The patient was asked a 14 point review of systems regarding constitutional symptoms, eye symptoms, ears, nose, mouth, throat symptoms, cardiovascular symptoms, respiratory symptoms, gastrointestinal symptoms, genitourinary symptoms, musculoskeletal symptoms, integumentary symptoms, neurological symptoms, psychiatric symptoms, endocrine symptoms, hematologic/lymphatic symptoms, and allergic/ immunologic symptoms.   The pertinent factors have been included in the HPI and below.  Patient Supplied Answers to Review of Systems  [facility] ROS 1/17/2022   Psychology -   Ears, Nose, Throat Nasal congestion or drainage   Cardiopulmonary -       Physical Examination   The patient underwent a physical examination as described below. The pertinent positive and negative findings are summarized after the description of the examination.  Constitutional: The  patient's developmental and nutritional status was assessed. The patient's voice quality was assessed.  Head and Face: The head and face were inspected for deformities. The sinuses were palpated. The salivary glands were palpated. Facial muscle strength was assessed bilaterally.  Eyes: Extraocular movements and primary gaze alignment were assessed.  Ears, Nose, Mouth and Throat: The ears and nose were examined for deformities. The nasal septum, mucosa, and turbinates were inspected by anterior rhinoscopy. The lips, teeth, and gums were examined for abnormalities. The oral mucosa, tongue, palate, tonsils, lateral and posterior pharynx were inspected for the presence of asymmetry or mucosal lesions.    Neck: The tracheal position was noted, and the neck mass palpated to determine if there were any asymmetries, abnormal neck masses, thyromegally, or thyroid nodules.  Respiratory: The nature of the breathing and chest expansion/symmetry was observed.  Cardiovascular: The patient was examined to determine the presence of any edema or jugular venous distension.  Abdomen: The contour of the abdomen was noted.  Lymphatic: The patient was examined for infraclavicular lymphadenopathy.  Musculoskeletal: The patient was inspected for the presence of skeletal deformities.  Extremities: The extremities were examined for any clubbing or cyanosis.  Skin: The skin was examined for inflammatory or neoplastic conditions.  Neurologic: The patient's orientation, mood, and affect were noted. The cranial nerve  functions were examined.  Other pertinent positive and negative findings on physical examination:   OC/OP: no lesions, uvula midline, soft palate elevates symmetrically  Neck: no lesions     All other physical examination findings were within normal limits and noncontributory.    Procedures   Flexible laryngoscopy (CPT 78264)      Pre-procedure diagnosis: subglottic stenosis  Post-procedure diagnosis: same as above  Indication for  procedure: Ms. Orona is a 31 year old female with see above  Procedure(s): Fiberoptic Laryngoscopy    Details of Procedure: After informed consent was obtained, the patient was seated in the examination chair.  The areas of the nasopharynx as well as the hypopharynx were anesthetized with topical 4% lidocaine with 0.25% phenylephrine atomizer.  Examination of the base of tongue was performed first.  Attention was directed to any evidence of masses in the area or evidence of leukoplakia or candidal infection.  Attention was directed to the epiglottis where its size and position was determined and its movement on phonation of the vowel  e .  The piriform sinuses were then inspected for any mass lesions or pooling of secretions.  Attention was then directed to the larynx. The vocal folds were inspected for infection or any areas of leukoplakia, for masses, polypoid degeneration, or hemorrhage.  Having done this, the arytenoids and vocal processes were inspected for erythema or evidence of granuloma formation.  The posterior commissure was then inspected for evidence of inflammatory changes in the mucosa and heaping up of mucosal tissue. The patient was then instructed to say the vowel  e .  Adduction of vocal folds to the midline was observed for any evidence of paresis or paralysis of the larynx or asymmetry in rotation of the larynx to the left or right. The patient was asked to breathe and the degree of abduction was noted bilaterally.  Subglottic view of the larynx was obtained for any additional mass lesions or mucosal changes.  Finally the post cricoid was examined for evidence of pooling of secretions, as well as the pharyngeal wall mucosa.   Anesthesia type: 0.25% phenylephrine    Findings:  Anatomic/physiological deviations: RNC, subglottic stenosis improved, grade 1, 20% narrowing, some mild white granulation anteriorly, right vocal fold slight paresis    Right vocal process: Little restriction of mobility  "  Left vocal process: No restriction of mobility  Glottal gap: Complete glottal closure  Supraglottic structures: Normal  Hypopharynx: Normal     Estimated Blood Loss: minimal  Complications: None  Disposition: Patient tolerated the procedure well                    Review of Relevant Clinical Data   I personally reviewed:  Labs:  No results found for: TSH  No results found for: NA, CO2, BUN, CREAT, GLUCOSE, PHOS  No results found for: WBC, HGB, HCT, MCV, PLT  No results found for: PT, PTT, INR  No results found for: TERI  No components found for: RHEUMATOIDFACTOR,  RF  No results found for: CRP  No components found for: CKTOT, URICACID  No components found for: C3, C4, DSDNAAB, NDNAABIFA  No results found for: MPOAB     Patient reported Quality of Life (QOL) Measures   Patient Supplied Answers To VHI Questionnaire  Voice Handicap Index (VHI-10) 1/2/2020   My voice makes it difficult for people to hear me 1   People have difficulty understanding me in a noisy room 1   My voice difficulties restrict my personal and social life.  0   I feel left out of conversations because of my voice 0   My voice problem causes me to lose income 0   I feel as though I have to strain to produce voice 0   The clarity of my voice is unpredictable 0   My voice problem upsets me 0   My voice makes me feel handicapped 0   People ask, \"What's wrong with your voice?\" 0   VHI-10 2         Patient Supplied Answers To EAT Questionnaire  Eating Assessment Tool (EAT-10) 1/17/2022   My swallowing problem has caused me to lose weight 0   My swallowing problem interferes with my ability to go out for meals 0   Swallowing liquids takes extra effort 0   Swallowing solids takes extra effort 0   Swallowing pills takes extra effort 0   Swallowing is painful 0   The pleasure of eating is affected by my swallowing 0   When I swallow food sticks in my throat 0   I cough when I eat 0   Swallowing is stressful 0   EAT-10 0         Patient Supplied Answers To " CSI Questionnaire  Cough Severity Index (CSI) 2022   My cough is worse when I lie down 0   My coughing problem causes me to restrict my personal and social life 0   I tend to avoid places because of my cough problem 0   I feel embarrassed because of my coughing problem 2   People ask, ''What's wrong?'' because I cough a lot 1   I run out of air when I cough 0   My coughing problem affects my voice 0   My coughing problem limits my physical activity 0   My coughing problem upsets me 0   People ask me if I am sick because I cough a lot 0   CSI Score 3         Patient Supplied Answers to Dyspnea Index Questionnaire:  Dyspnea Index 2022   1. I have trouble getting air in. 2   2. I feel tightness in my throat when I am having angel breathing problem. 1   3. It takes more effort to breathe than it used to. 1   4. Change in weather affect my breathing problem. 3   5. My breathing gets worse with stress. 1   6. I make sound/noise breathing in 2   7. I have to strain to breathe. 1   8. My shortness of breath gets worse with exercise or physical activity 2   9. My breathing problem makes me feel stressed. 2   10. My breathing problem casuses me to restrict my personal and social life. 0   Dyspnea Index Total Score 15       Impression & Plan     IMPRESSION: Ms. Orona is a 31 year old female who is being seen for the followin.    Dyspnea  - due to subglottic stenosis   - intubation history possible in  due to an ovarian cyst - not sure  - diabetes - HgA1c 21 - 5.7   - autoimmune labs - from 10/21/19 - TERI, RF, ANCA - negative  - biopsy none  - imaging none  - patient symptoms with dyspnea  - scope showed grade 3 subglottic stenosis on 19  - discussed etiology of trauma (intubation), autoimmune, diabetes or idiopathic. In this case this is most likely DM and prior intubation induced  - discussed treatment with observation, conservative management with oral abx/steroids/reflux precautions, steroid  injections, endoscopic procedures, open resection and bypass procedure with tracheotomy  - patient elects to proceed with endoscopic surgery  - s/p  MDL with CO2 laser resection, dilation and steroid injection on 11/5/19  - symptoms have come back for the past 6 months (reported today 12/2/21)  - scope today shows grade 3, 80% narrowing subglottically  - peak flow meter today is 200  - discussed she needs another dilation after which we will discuss long term management again to establish goals and a follow up plan  - discussed given her severity of stenosis as well as her weight, her surgery is high risk and she is at risk of a trach   - s/p flex bronch with CO2 laser and dilation to 12 mmHg 12/22/2021  - symptoms today 01/18/2022 are improved with better breathing, peak flow meter is 375  - scope today 01/18/2022 shows subglottic stenosis improved, grade 1, 20% narrowing, some mild white granulation anteriorly, right vocal fold slight paresis, minimal restriction on the right   - discussed weight management and diabetic control   - discussed steroid injection, risks, benefits and alternatives were discussed  Plan  - schedule steroid injection, if covered by insurance, reached out to [facility] for CPT code  - referral for weight management to [facility] team  - management of HgA1c  - monthly peak flow meters      2. Dysphonia  - feels like she does not know how to breathe when she is talking   - had voice complaints in 2019 when she was first seen  - strobe at the time showed supraglottic hyperfunction, asymmetric vocal fold vibration and thickening   - scope today shows supraglottic hyperfunction and right vocal fold paresis   - will need close observation for the paresis    Plan  - voice therapy    - cough/throat clearing suppression therapy  - follow up in 3 months to assess vocal fold mobility           RETURN VISIT: 3 months     Scribe Disclosure:  I, Heather Mckinney am serving as a scribe to document services  personally performed by Nadine Hoffmann MD at this visit, based upon the provider's statements to me. All documentation has been reviewed by the aforementioned provider prior to being entered into the official medical record.     Nadine Hoffmann MD    Laryngology    [facility]  Department of  Otolaryngology - Head and Neck Surgery  Clinics & Surgery Center  [address] [place] [address]  Appointment line: [phone]  Fax: [phone]  [email or web address]      Again, thank you for allowing me to participate in the care of your patient.      Sincerely,    Nadine Hoffmann MD

## 2022-01-20 ENCOUNTER — OFFICE VISIT (OUTPATIENT)
Dept: OTOLARYNGOLOGY | Facility: CLINIC | Age: 32
End: 2022-01-20
Payer: COMMERCIAL

## 2022-01-20 DIAGNOSIS — J38.6 SUBGLOTTIC STENOSIS: ICD-10-CM

## 2022-01-20 DIAGNOSIS — R49.0 DYSPHONIA: Primary | ICD-10-CM

## 2022-01-20 PROCEDURE — 92507 TX SP LANG VOICE COMM INDIV: CPT | Mod: GN | Performed by: SPEECH-LANGUAGE PATHOLOGIST

## 2022-01-20 NOTE — PATIENT INSTRUCTIONS
"After Visit Summary    Patient: Alisa Orona  Date of Visit: 2022    These notes are also available in your MyChart. Please take a few moments to find them under \"Past Appointments\" in the [employer] system, as Chrissy will start to phase out e-mail communications.    Order of today's appointment: 2-3x/day    Breathing:     In the morning and evening (twice daily) for 2-5 minutes:   o Breathe while lying on your back with your face and knees up. Hands on tummy and chest.  Take a breath in with rounded lips and exhale with a  shhh    o Inhale  = Inflate; exhale = deflate  o 3x each: try breathin in/8 out,  3/4  o Throughout the day (2-3x/day for just a couple minutes) check breathing while keeping shoulders relaxed (riding to and from school, etc.)  o Breath Pacing:     Coordinate sipping breath with counting:    Breathe in through rounded lips with tongue behind lower teeth or touching at the bump behind upper teeth    Sip breath \"1\"    Sip breath \"1-2\"    Sip breath \"1-2-3\"    Sip breath \"1-2-3-4\"... up to 10    Rescue Breathing Techniques:  1. Breathing in through rounded lips and out with a \"sh\"  2. Breathing in through the nose and out with \"sh\"  3. Repeated sniffs/inhales through rounded lips and out with a \"sh\"       Breathing Tips:  ? Tongue behind the lower teeth  ? Tongue up when exposed to cold air or warm air  ? Keep shoulders down and chest relaxed      Semi-Occluded Vocal Tract Exercise   Bubbles (straw in 1 to 1.5\" of water) 2-3x/day for 30-60 seconds. Count to 6 or 8 in your head with each trial:  1-2x: blow 10-15 seconds with no voice and keep bubbles consistent.  3x: blow bubbles and add a sustained  who  or an  oo  (Ab3 - comfortable speaking pitch)  3x: blow bubbles and vary   High to low (descending glide or yawn and sigh)   who  gliding up and down            Up and down like a sine wave**  3x: blow bubbles on a sustained/ varied pitch soft to loud to soft (messa di voce)    1-2x: Happy " "birthday bubbles (keep connected)    These exercises are great for:    *Instructed on the importance of using these exercises as a warm-up / cool down,  and to calibrate the voice throughout the day to an easy, flowing voice quality with less roughness.    *tissue mobilization exercise - Improving the condition and pliability/ flexibility of the vocal folds.    *Abdominal breathing and applying optimal breath flow to speech/singing and reduce a \"gravelly\" voice quality.    Harish Pearson \"Straws will save your life\" on [employer] (example of straw phonation with water resistance):   https://www.[employer].com/search?q=harish+pearson+straws&oq=harish&aqs=chrome.[email or web address]&sourceid=ePaisa [employer] | Anywherekirsten&ie=UTF-8      Perfect piano valerie - FREE      Phrases for Blending   fall over   go into   put upon   leave on   the only   lose it   see it   the other   win it   do it    not even   that's enough   put on   not any   leave open   down under  cold as   one of   not old   the ice   she's ill   look at   he's ill   then add   the end   yes and   so it   high up   one at   Ven[place] is    Fall_over_the_chair                     Go_(w)into the store  Leave_on_the_lights                    The(e)_(y)only one  See_it_over_there                        The(e)_(y)other day  Do_it_now     Not_even her mom  Put(d)_on_your_shoes         Not_any more  Down_under_the_stairs  Cold_as ice  Not_old_enough    the ice is cold  Look_at_the_sky    he's ill with the flu  The_end_of_the_story       yes_and no  High_up_on_the_shelf     one at a time          1. Call your mom.        INSTRUCTIONS FOR LIFE  2. Read between the lines.   3. Spend some time alone.  4. Be gentle with the earth.  5. Mind your own business.  6. Talk slowly, but think quickly.  7. Believe in love at first sight.  8. Memorize your favorite poem.  9. Trust in God, but lock your car.  10. Learn the rules, and then break some.  11. Never laugh at anyone's dreams.  12. When you lose, don't " lose the lesson.  13. Don't  people by their relatives.  14. When you say  I love you,  mean it.  15. Read more books and watch less TV.  16. [place]. There's immeasurable power in it.  17. Never interrupt when you are being flattered.  18. Remember that your character is your jaylon.  19. In disagreements, fight fairly. No name calling.  20. Don't let a little dispute injure a great friendship.  21. Say  Bless you  when you hear someone sneeze.  22. Approach love and cooking with reckless abandon.  23. Remember that silence is sometimes the best answer.  24. Once a year, go someplace you've never been before.  25. Be engaged at least six months before you get .  26. When you say  I am sorry,  look the person in the eye.  27. Give people more than they expect and do it cheerfully.  28. Share your knowledge. It's a way to achieve immortality.  29. Open your arms to change, but don't let go of your values.  30.  your success by what you had to give up in order to get it.  31. Remember that great love and great achievements involve great risk.  32. Don't believe all you hear, spend all you have or sleep all you want.  33. Smile when picking up the phone, the caller will hear it in your voice.  34. Remember that not getting what you want is sometimes a stroke of luck.  35. When you realize you've made a mistake, take immediate steps to correct it.  36. Don't trust a person who doesn't close their eyes when you kiss them.  37. In disagreements with loved ones, deal with the current situation. Don't bring up the past.  38. Love deeply and passionately. You might get hurt but it's the only way to live life completely.  39. Remember the 3 R's: Respect for self; Respect for others; Responsibility for all your actions.   40. A loving atmosphere in your home is so important. Do everything you can to create a tranquil, harmonious home.  41. Live a good honorable life. Then when you get older and think back,  you'll get to enjoy it a second time.  42. When someone asks you a question you don't want to answer, smile and ask,  Why do you want to know?   43. If you make a lot of money, put it to use helping others while you are living. That is wealth's greatest satisfaction.  44. Remember that the best relationship is one where your love for each other is greater than your need for each other.  45.  a person you love to talk to. As you get older, their conversational skills will be as important as any other.     When the sunlight strikes raindrops in the air, they act like a prism and form a rainbow.  The     rainbow is a division of white light into many beautiful colors.  These take the shape of a long     round arch, with its path high above, and its two ends apparently beyond the horizon.  There is,     according to legend, a boiling pot of gold at one end.  People look, but no one ever finds it.      When a man looks for something beyond his reach, his friends say he is looking for the pot of     gold at the end of the rainbow.    Chrissy Coffey M.M. (voice), M.A., CCC/SLP  Speech-Language Pathologist  [facility] Certified Vocologist  [facility]  [email or web address]  she/her

## 2022-01-20 NOTE — LETTER
"1/20/2022       RE: Alise Orona  [address] [id number]     Dear Colleague,    Thank you for referring your patient, Alise Orona, to the [facility] [place] at [facility]. Please see a copy of my visit note below.      [facility] VOICE [facility]  THERAPY NOTE (CPT 54773) -IN PERSON    Patient: Alise Orona  Date of Service: 1/20/2022  [facility] VOICE [facility]  THERAPY NOTE (CPT 78229)  Patient: Alise Orona  Date of Service: 1/20/2022  Referring physician: Dr. 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   Remarkable findings included:    Perceptual evaluation demonstrated: Intermittent mild to moderate roughness and strain in mid to upper range.    Laryngeal exam demonstrated: Sluggish movement of the right vocal fold.  Stenosis remains present     Primary complaint of patient today included: Dysphonia, especially with her singing voice  Therefore, we recommended a course of speech therapy to address these concerns.\"    SUBJECTIVE:  Since the last appointment, Ms. Orona reports the following:     Overall she reports that symptoms are stable; this is her initial therapy appointment    OBJECTIVE:  Ms. Orona presents today with the following:  ? Roughness: Mild to moderate; intermittent in mid to upper range  ? Breathiness: WNL  ? Strain: mild to moderate intermittent in mid to upper range  ? Loudness    Conversational speech:  WNL    Projected speech:  WNL  ? Pitch:  ? F0/ Habitual Pitch: WNL  ? High pitch demonstrated today: limited upper range    Pitch glide: neurologically normal, but mildly limited in upper range    PATIENT REPORTED MEASURES:  Patient Supplied Answers To SLP QOL " "Questionnaire  No flowsheet data found.    THERAPEUTIC ACTIVITIES    Exercises to promote optimal respiratory mechanics    she demonstrated clavicular/neck/shoulder involvement in inhalation    Demonstrated difficulty allowing abdominal relaxation for inhalation    Practiced in a prone and supine position on the massage table, with tactile cue of a hand on the low rib-cage to facilitate awareness of low respiratory engagement.  Progressed to even stand positions today.    With clinician support, patient was able to demonstrate improved abdominal relaxation and engagement on inhalation    Optimal exhalation using inward engagement of the abdominal wall with no corresponding collapse of the upper chest cavity was trained using the pulsed \"sh\" task    [place] a respiratory pacing exercise; this was helpful    acceptable improvement in airflow and respiratory mechanics    Semi-Occluded Vocal Tract (SOVT) exercises instructed to reduce laryngeal tension, promote vocal fold pliability, and coordinate respiration and phonation    Straw phonation with water resistance was found to be most facilitating     Sustained phonation, and voice vs. voiceless productions used to promote easy voicing and raise awareness of laryngeal tension    Ascending and descending glides utilized to promote vocal fold pliability    \"Messa di voce\", gradual crescendo and decrescendo to vary medial compression was also utilized to promote vocal fold pliability.    Instructed on the benefits of using these exercises for improved coordination of breath flow with phonation and tissue mobilization.    Instructed on the importance of using these exercises as a warm-up / cool down,  and to re-calibrate the voice throughout the day.    Exercises in techniques for improved airflow during phonation    Speech material with /ju/ glides was facilitating at the word level.    Progressed to easy onset/ flow and blending phrases    Instructed with a yawn and sigh " pattern; this was helpful.    [place] techniques to reduce glottal bob and improve breath flow; negative practice improved awareness today.    Instructed in techniques to improve length of utterance with reduced effort for optimal carryover.  o Instructed with phrases of increasing length    Developed a mental checklist of factors to help trouble shoot moments of difficulty during daily speaking tasks.    Counseling and Education:    Asked many questions about the nature of her symptoms, and I answered all of these thoroughly.    A revised regimen for home practice was instructed.    I provided a AVS and handouts of today's therapeutic activities to facilitate practice.    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  Right vocal fold also appeared mildly sluggish in mobility, but it was not clear if it was related to insulin balance or neurologic issue.   Ms. Orona demonstrated good learning of therapeutic activities designed to improve her restriction the portion of her breath left phonation for speech.  We will continue to work on this and acting at her singing voice in a future session.    PLAN: I will see Ms. Orona in 3-4 weeks, at which point we will continue therapy.   For practice goals see AVS.     TOTAL SERVICE TIME: 60 minutes  TREATMENT (87137): 60 minutes  NO CHARGE FACILITY FEE (24201)    Chrissy Coffey M.M. (voice), M.A., CCC/SLP  Speech-Language Pathologist  Certificate of Vocology  [facility]  [phone]  [email or web address]  Pronouns: she/her        Again, thank you for allowing me to participate in the care of your patient.      Sincerely,    Chrissy Coffey, SLP

## 2022-01-21 ENCOUNTER — PREP FOR PROCEDURE (OUTPATIENT)
Dept: OTOLARYNGOLOGY | Facility: CLINIC | Age: 32
End: 2022-01-21
Payer: COMMERCIAL

## 2022-01-21 DIAGNOSIS — J38.6 SUBGLOTTIC STENOSIS: Primary | ICD-10-CM

## 2022-01-24 ENCOUNTER — PATIENT OUTREACH (OUTPATIENT)
Dept: OTOLARYNGOLOGY | Facility: CLINIC | Age: 32
End: 2022-01-24
Payer: COMMERCIAL

## 2022-01-24 DIAGNOSIS — Z76.89 ENCOUNTER FOR WEIGHT MANAGEMENT: Primary | ICD-10-CM

## 2022-01-24 NOTE — PROGRESS NOTES
Reached out to patient to discuss steroid injection. Possible procedure date of this Wednesday 1/16/22. Patient would need a Covid test if she is able to proceed. Provided direct call back number to discuss.    Aneta Bonilla RN on 1/24/2022 at 9:52 AM

## 2022-01-26 ENCOUNTER — DOCUMENTATION ONLY (OUTPATIENT)
Dept: OTOLARYNGOLOGY | Facility: CLINIC | Age: 32
End: 2022-01-26
Payer: COMMERCIAL

## 2022-01-26 NOTE — PROGRESS NOTES
Referral for weight management faxed to Fabiana at [facility]. Fax number [phone]. 2 pgs faxed.

## 2022-01-28 ENCOUNTER — MYC MEDICAL ADVICE (OUTPATIENT)
Dept: OTOLARYNGOLOGY | Facility: CLINIC | Age: 32
End: 2022-01-28
Payer: COMMERCIAL

## 2022-01-28 ENCOUNTER — PREP FOR PROCEDURE (OUTPATIENT)
Dept: OTOLARYNGOLOGY | Facility: CLINIC | Age: 32
End: 2022-01-28
Payer: COMMERCIAL

## 2022-01-31 ENCOUNTER — TELEPHONE (OUTPATIENT)
Dept: OTOLARYNGOLOGY | Facility: CLINIC | Age: 32
End: 2022-01-31
Payer: COMMERCIAL

## 2022-02-01 ENCOUNTER — ALLIED HEALTH/NURSE VISIT (OUTPATIENT)
Dept: OTOLARYNGOLOGY | Facility: CLINIC | Age: 32
End: 2022-02-01
Payer: COMMERCIAL

## 2022-02-01 DIAGNOSIS — Z11.59 ENCOUNTER FOR SCREENING FOR OTHER VIRAL DISEASES: ICD-10-CM

## 2022-02-01 DIAGNOSIS — J38.6 SUBGLOTTIC STENOSIS: Primary | ICD-10-CM

## 2022-02-01 LAB — SARS-COV-2 RNA RESP QL NAA+PROBE: NEGATIVE

## 2022-02-01 PROCEDURE — U0003 INFECTIOUS AGENT DETECTION BY NUCLEIC ACID (DNA OR RNA); SEVERE ACUTE RESPIRATORY SYNDROME CORONAVIRUS 2 (SARS-COV-2) (CORONAVIRUS DISEASE [COVID-19]), AMPLIFIED PROBE TECHNIQUE, MAKING USE OF HIGH THROUGHPUT TECHNOLOGIES AS DESCRIBED BY CMS-2020-01-R: HCPCS | Mod: 90 | Performed by: PATHOLOGY

## 2022-02-01 PROCEDURE — 99000 SPECIMEN HANDLING OFFICE-LAB: CPT | Performed by: PATHOLOGY

## 2022-02-01 PROCEDURE — 99207 PR NO CHARGE NURSE ONLY: CPT

## 2022-02-01 PROCEDURE — U0005 INFEC AGEN DETEC AMPLI PROBE: HCPCS | Mod: 90 | Performed by: PATHOLOGY

## 2022-02-01 NOTE — TELEPHONE ENCOUNTER
Left voicemail for patient regarding doing steroid injection tomorrow and need for Covid test to be done today. Offered patient to come in to clinic to be swabbed. Provided direct call back number and asked to call back as soon as possible.    Aneta Bonilla RN on 2/1/2022 at 9:38 AM

## 2022-02-01 NOTE — PROGRESS NOTES
Patient swabbed in clinic and sent down to lab for tomorrow's procedure. Patient has no further questions at this time.    Aneta Bonilla RN on 2/1/2022 at 1:19 PM

## 2022-02-01 NOTE — TELEPHONE ENCOUNTER
Patient returned call and confirmed procedure tomorrow. Patient will come to clinic at 1:00pm for Covid test. Patient has no further questions at this time and in agreement to plan.    Aneta Bonilla RN on 2/1/2022 at 11:50 AM

## 2022-02-02 ENCOUNTER — PREP FOR PROCEDURE (OUTPATIENT)
Dept: OTOLARYNGOLOGY | Facility: CLINIC | Age: 32
End: 2022-02-02

## 2022-02-02 ENCOUNTER — HOSPITAL ENCOUNTER (OUTPATIENT)
Facility: AMBULATORY SURGERY CENTER | Age: 32
Discharge: HOME | End: 2022-02-02
Attending: OTOLARYNGOLOGY | Admitting: OTOLARYNGOLOGY
Payer: COMMERCIAL

## 2022-02-02 VITALS
HEART RATE: 74 BPM | TEMPERATURE: 98 F | BODY MASS INDEX: 46.65 KG/M2 | WEIGHT: 280 LBS | DIASTOLIC BLOOD PRESSURE: 68 MMHG | OXYGEN SATURATION: 98 % | RESPIRATION RATE: 18 BRPM | SYSTOLIC BLOOD PRESSURE: 122 MMHG | HEIGHT: 65 IN

## 2022-02-02 DIAGNOSIS — J38.6 SUBGLOTTIC STENOSIS: Primary | ICD-10-CM

## 2022-02-02 PROCEDURE — 31573 LARGSC W/THER INJECTION: CPT | Performed by: OTOLARYNGOLOGY

## 2022-02-02 PROCEDURE — 31573 LARGSC W/THER INJECTION: CPT

## 2022-02-02 RX ORDER — LIDOCAINE HYDROCHLORIDE 40 MG/ML
SOLUTION TOPICAL PRN
Status: DISCONTINUED | OUTPATIENT
Start: 2022-02-02 | End: 2022-02-02 | Stop reason: HOSPADM

## 2022-02-02 RX ORDER — FLUTICASONE PROPIONATE 50 MCG
2 SPRAY, SUSPENSION (ML) NASAL 2 TIMES DAILY
Qty: 9.9 ML | Refills: 3 | Status: SHIPPED | OUTPATIENT
Start: 2022-02-02 | End: 2022-03-04

## 2022-02-02 RX ORDER — TRIAMCINOLONE ACETONIDE 40 MG/ML
INJECTION, SUSPENSION INTRA-ARTICULAR; INTRAMUSCULAR PRN
Status: DISCONTINUED | OUTPATIENT
Start: 2022-02-02 | End: 2022-02-02 | Stop reason: HOSPADM

## 2022-02-02 ASSESSMENT — MIFFLIN-ST. JEOR: SCORE: 1980.95

## 2022-02-02 NOTE — OP NOTE
Operative Note   Otolaryngology - Head and Neck Surgery       DATE OF OPERATION:   February 2, 2022    PREOPERATIVE DIAGNOSIS:   Subglottic stenosis      POSTOPERATIVE DIAGNOSIS:   Same    NAME OF OPERATION:   Flexible bronchoscopy and steroid injection for subglottic stenosis      ANESTHESIA  Type: local    SURGEON:   Nadine Hoffmann MD    INDICATIONS FOR PROCEDURE:   The patient is a 31 year old female with a history of subglottic stenosis. She is being brought to the Operating Room for subsequent treatment of the stenosis. She underwent flex bronch with CRE balloon dilation to 12mmHg on 11/5/19 and 12/22/21. Risks, benefits, alternatives, and rationale for the procedure(s) listed above were discussed with the patient, and the patient wishes to proceed with surgery and has signed an informed consent.     FINDINGS:   Grade 1, 20% stenosis seen today. This was stable from last assessment.  >1.0 cc of Kenalog administered  >Jericho and Mainstem Bronchi: unremarkable     VT2 did not fit, used vision science scope via the left nostril above the inferior turbinate  Has scar band starting to reform     DESCRIPTION OF PROCEDURE:   The patient was brought into the operating room and  seated upright in the chair. Topical anesthesia was achieved by spraying 4% topical lidocaine directly onto the vocal folds and trachea. After adequate anesthesia was achieved, the flexible bronchoscope was passed through the glottis into the trachea.  Abnormalities were noted.  The jericho was visualized, followed by further insertion of the scope to the main stem bronchus level to evaluate the bronchi as well as the orifices of the sub-segmental bronchi. Then Kenalog steroid was injected into the stenosis in several quadrants. Having completed this the operation was completed and the scope withdrawn atraumatically.     COMPLICATIONS:   None.  .   ESTIMATED BLOOD LOSS:   Less than 10cc    DISPOSITION:   PACU.    SPECIMENS:  * No specimens in log  *       PHOTODOCUMENTATION:

## 2022-02-02 NOTE — PROGRESS NOTES
"  [facility] VOICE [facility]  THERAPY NOTE (CPT 91585) -IN PERSON    Patient: Alise Orona  Date of Service: 1/20/2022  [facility]  THERAPY NOTE (CPT 61075)  Patient: Alise Orona  Date of Service: 1/20/2022  Referring physician: Dr. 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   Remarkable findings included:    Perceptual evaluation demonstrated: Intermittent mild to moderate roughness and strain in mid to upper range.    Laryngeal exam demonstrated: Sluggish movement of the right vocal fold.  Stenosis remains present     Primary complaint of patient today included: Dysphonia, especially with her singing voice  Therefore, we recommended a course of speech therapy to address these concerns.\"    SUBJECTIVE:  Since the last appointment, Ms. Orona reports the following:     Overall she reports that symptoms are stable; this is her initial therapy appointment    OBJECTIVE:  Ms. Orona presents today with the following:  ? Roughness: Mild to moderate; intermittent in mid to upper range  ? Breathiness: WNL  ? Strain: mild to moderate intermittent in mid to upper range  ? Loudness    Conversational speech:  WNL    Projected speech:  WNL  ? Pitch:  ? F0/ Habitual Pitch: WNL  ? High pitch demonstrated today: limited upper range    Pitch glide: neurologically normal, but mildly limited in upper range    PATIENT REPORTED MEASURES:  Patient Supplied Answers To SLP QOL Questionnaire  No flowsheet data found.    THERAPEUTIC ACTIVITIES    Exercises to promote optimal respiratory mechanics    she demonstrated clavicular/neck/shoulder involvement in inhalation    Demonstrated difficulty allowing abdominal relaxation for inhalation    Practiced in a prone and supine position on the massage table, with tactile cue " "of a hand on the low rib-cage to facilitate awareness of low respiratory engagement.  Progressed to even stand positions today.    With clinician support, patient was able to demonstrate improved abdominal relaxation and engagement on inhalation    Optimal exhalation using inward engagement of the abdominal wall with no corresponding collapse of the upper chest cavity was trained using the pulsed \"sh\" task    [place] a respiratory pacing exercise; this was helpful    acceptable improvement in airflow and respiratory mechanics    Semi-Occluded Vocal Tract (SOVT) exercises instructed to reduce laryngeal tension, promote vocal fold pliability, and coordinate respiration and phonation    Straw phonation with water resistance was found to be most facilitating     Sustained phonation, and voice vs. voiceless productions used to promote easy voicing and raise awareness of laryngeal tension    Ascending and descending glides utilized to promote vocal fold pliability    \"Messa di voce\", gradual crescendo and decrescendo to vary medial compression was also utilized to promote vocal fold pliability.    Instructed on the benefits of using these exercises for improved coordination of breath flow with phonation and tissue mobilization.    Instructed on the importance of using these exercises as a warm-up / cool down,  and to re-calibrate the voice throughout the day.    Exercises in techniques for improved airflow during phonation    Speech material with /ju/ glides was facilitating at the word level.    Progressed to easy onset/ flow and blending phrases    Instructed with a yawn and sigh pattern; this was helpful.    [place] techniques to reduce glottal bob and improve breath flow; negative practice improved awareness today.    Instructed in techniques to improve length of utterance with reduced effort for optimal carryover.  o Instructed with phrases of increasing length    Developed a mental checklist of factors to help " trouble shoot moments of difficulty during daily speaking tasks.    Counseling and Education:    Asked many questions about the nature of her symptoms, and I answered all of these thoroughly.    A revised regimen for home practice was instructed.    I provided a AVS and handouts of today's therapeutic activities to facilitate practice.    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  Right vocal fold also appeared mildly sluggish in mobility, but it was not clear if it was related to insulin balance or neurologic issue.   Ms. Orona demonstrated good learning of therapeutic activities designed to improve her restriction the portion of her breath left phonation for speech.  We will continue to work on this and acting at her singing voice in a future session.    PLAN: I will see Ms. Orona in 3-4 weeks, at which point we will continue therapy.   For practice goals see AVS.     TOTAL SERVICE TIME: 60 minutes  TREATMENT (23193): 60 minutes  NO CHARGE FACILITY FEE (31129)    Chrissy Coffey M.M. (voice), M.A., CCC/SLP  Speech-Language Pathologist  Certificate of Vocology  [facility]  [phone]  [email or web address]  Pronouns: she/her

## 2022-02-07 ENCOUNTER — TELEPHONE (OUTPATIENT)
Dept: OTOLARYNGOLOGY | Facility: CLINIC | Age: 32
End: 2022-02-07

## 2022-02-16 DIAGNOSIS — Z11.59 ENCOUNTER FOR SCREENING FOR OTHER VIRAL DISEASES: Primary | ICD-10-CM

## 2022-03-16 ENCOUNTER — LAB (OUTPATIENT)
Dept: LAB | Facility: CLINIC | Age: 32
End: 2022-03-16
Attending: OTOLARYNGOLOGY
Payer: COMMERCIAL

## 2022-03-16 DIAGNOSIS — Z11.59 ENCOUNTER FOR SCREENING FOR OTHER VIRAL DISEASES: ICD-10-CM

## 2022-03-16 LAB — SARS-COV-2 RNA RESP QL NAA+PROBE: NEGATIVE

## 2022-03-16 PROCEDURE — U0005 INFEC AGEN DETEC AMPLI PROBE: HCPCS | Mod: 90 | Performed by: PATHOLOGY

## 2022-03-16 PROCEDURE — U0003 INFECTIOUS AGENT DETECTION BY NUCLEIC ACID (DNA OR RNA); SEVERE ACUTE RESPIRATORY SYNDROME CORONAVIRUS 2 (SARS-COV-2) (CORONAVIRUS DISEASE [COVID-19]), AMPLIFIED PROBE TECHNIQUE, MAKING USE OF HIGH THROUGHPUT TECHNOLOGIES AS DESCRIBED BY CMS-2020-01-R: HCPCS | Mod: 90 | Performed by: PATHOLOGY

## 2022-03-16 PROCEDURE — 99000 SPECIMEN HANDLING OFFICE-LAB: CPT | Performed by: PATHOLOGY

## 2022-03-18 ENCOUNTER — PREP FOR PROCEDURE (OUTPATIENT)
Dept: OTOLARYNGOLOGY | Facility: CLINIC | Age: 32
End: 2022-03-18

## 2022-03-18 ENCOUNTER — HOSPITAL ENCOUNTER (OUTPATIENT)
Facility: AMBULATORY SURGERY CENTER | Age: 32
Discharge: HOME | End: 2022-03-18
Attending: OTOLARYNGOLOGY | Admitting: OTOLARYNGOLOGY
Payer: COMMERCIAL

## 2022-03-18 VITALS
BODY MASS INDEX: 44.98 KG/M2 | DIASTOLIC BLOOD PRESSURE: 87 MMHG | TEMPERATURE: 97 F | WEIGHT: 270 LBS | OXYGEN SATURATION: 98 % | RESPIRATION RATE: 18 BRPM | HEIGHT: 65 IN | SYSTOLIC BLOOD PRESSURE: 139 MMHG | HEART RATE: 92 BPM

## 2022-03-18 DIAGNOSIS — Z11.59 ENCOUNTER FOR SCREENING FOR OTHER VIRAL DISEASES: Primary | ICD-10-CM

## 2022-03-18 DIAGNOSIS — J38.6 SUBGLOTTIC STENOSIS: Primary | ICD-10-CM

## 2022-03-18 PROCEDURE — 31573 LARGSC W/THER INJECTION: CPT | Mod: GC | Performed by: OTOLARYNGOLOGY

## 2022-03-18 PROCEDURE — 31573 LARGSC W/THER INJECTION: CPT

## 2022-03-18 RX ORDER — LIDOCAINE HYDROCHLORIDE 40 MG/ML
SOLUTION TOPICAL PRN
Status: DISCONTINUED | OUTPATIENT
Start: 2022-03-18 | End: 2022-03-18 | Stop reason: HOSPADM

## 2022-03-18 RX ORDER — TRIAMCINOLONE ACETONIDE 40 MG/ML
INJECTION, SUSPENSION INTRA-ARTICULAR; INTRAMUSCULAR PRN
Status: DISCONTINUED | OUTPATIENT
Start: 2022-03-18 | End: 2022-03-18 | Stop reason: HOSPADM

## 2022-03-18 NOTE — OP NOTE
Operative Note   Otolaryngology - Head and Neck Surgery       DATE OF OPERATION:   March 18, 2022    PREOPERATIVE DIAGNOSIS:   Subglottic stenosis      POSTOPERATIVE DIAGNOSIS:   Same    NAME OF OPERATION:   Flexible bronchoscopy and steroid injection for subglottic stenosis      ANESTHESIA  Type: local    SURGEON:   Nadine Hoffmann MD    RESIDENT SURGEON(S):   Celsa Wyatt MD    INDICATIONS FOR PROCEDURE:   The patient is a 31 year old female with a history of subglottic stenosis. She is being brought to the Operating Room for subsequent treatment of the stenosis. She underwent flex bronch with CRE balloon dilation to 12mmHg on 11/5/19, 12/22/21 and 2/2/22. Risks, benefits, alternatives, and rationale for the procedure(s) listed above were discussed with the patient, and the patient wishes to proceed with surgery and has signed an informed consent.   FINDINGS:   Grade 1, 10% stenosis seen today. This was stable from last assessment. Used vt2 via the LNC, kept scope in, distal small scar band was erythematous  >1.0 cc of Kenalog administered  >Jericho and Mainstem Bronchi: unremarkable     DESCRIPTION OF PROCEDURE:   The patient was brought into the operating room and  seated upright in the chair. Topical anesthesia was achieved by spraying 4% topical lidocaine directly onto the vocal folds and trachea. After adequate anesthesia was achieved, the flexible bronchoscope was passed through the glottis into the trachea.  Abnormalities were noted.  The jericho was visualized, followed by further insertion of the scope to the main stem bronchus level to evaluate the bronchi as well as the orifices of the sub-segmental bronchi. Then Kenalog steroid was injected into the stenosis in several quadrants. Having completed this the operation was completed and the scope withdrawn atraumatically.     Post-op plan: repeat injection in 1 month, continue flonase, is set up with weight management already, peak flow is  375    COMPLICATIONS:   None.  .   ESTIMATED BLOOD LOSS:   Less than 10cc    DISPOSITION:   PACU.    SPECIMENS:  * No specimens in log *       PHOTODOCUMENTATION:

## 2022-04-01 ENCOUNTER — PATIENT OUTREACH (OUTPATIENT)
Dept: OTOLARYNGOLOGY | Facility: CLINIC | Age: 32
End: 2022-04-01
Payer: COMMERCIAL

## 2022-04-01 NOTE — PROGRESS NOTES
Reached out to patient regarding upcoming procedure with Dr. Hoffmann on 4/15/22. Left voicemail for patient with information and informed her that we have also sent her a my chart message with this info.Encouraged to call back with any questions or concerns.    Aneta Bonilla RN on 4/1/2022 at 11:17 AM

## 2022-04-12 ENCOUNTER — LAB (OUTPATIENT)
Dept: LAB | Facility: CLINIC | Age: 32
End: 2022-04-12
Attending: OTOLARYNGOLOGY
Payer: COMMERCIAL

## 2022-04-12 DIAGNOSIS — Z11.59 ENCOUNTER FOR SCREENING FOR OTHER VIRAL DISEASES: ICD-10-CM

## 2022-04-12 LAB — SARS-COV-2 RNA RESP QL NAA+PROBE: NEGATIVE

## 2022-04-12 PROCEDURE — U0003 INFECTIOUS AGENT DETECTION BY NUCLEIC ACID (DNA OR RNA); SEVERE ACUTE RESPIRATORY SYNDROME CORONAVIRUS 2 (SARS-COV-2) (CORONAVIRUS DISEASE [COVID-19]), AMPLIFIED PROBE TECHNIQUE, MAKING USE OF HIGH THROUGHPUT TECHNOLOGIES AS DESCRIBED BY CMS-2020-01-R: HCPCS | Mod: 90 | Performed by: PATHOLOGY

## 2022-04-12 PROCEDURE — 99000 SPECIMEN HANDLING OFFICE-LAB: CPT | Performed by: PATHOLOGY

## 2022-04-12 PROCEDURE — U0005 INFEC AGEN DETEC AMPLI PROBE: HCPCS | Mod: 90 | Performed by: PATHOLOGY

## 2022-04-15 ENCOUNTER — PREP FOR PROCEDURE (OUTPATIENT)
Dept: OTOLARYNGOLOGY | Facility: CLINIC | Age: 32
End: 2022-04-15
Payer: COMMERCIAL

## 2022-04-15 ENCOUNTER — HOSPITAL ENCOUNTER (OUTPATIENT)
Facility: AMBULATORY SURGERY CENTER | Age: 32
Discharge: HOME | End: 2022-04-15
Attending: OTOLARYNGOLOGY | Admitting: OTOLARYNGOLOGY
Payer: COMMERCIAL

## 2022-04-15 VITALS
HEART RATE: 86 BPM | HEIGHT: 65 IN | DIASTOLIC BLOOD PRESSURE: 82 MMHG | BODY MASS INDEX: 44.15 KG/M2 | OXYGEN SATURATION: 98 % | SYSTOLIC BLOOD PRESSURE: 133 MMHG | TEMPERATURE: 97.7 F | RESPIRATION RATE: 16 BRPM | WEIGHT: 265 LBS

## 2022-04-15 PROCEDURE — 31622 DX BRONCHOSCOPE/WASH: CPT

## 2022-04-15 PROCEDURE — 31622 DX BRONCHOSCOPE/WASH: CPT | Performed by: OTOLARYNGOLOGY

## 2022-04-15 RX ORDER — LIDOCAINE HYDROCHLORIDE 40 MG/ML
SOLUTION TOPICAL PRN
Status: DISCONTINUED | OUTPATIENT
Start: 2022-04-15 | End: 2022-04-15 | Stop reason: HOSPADM

## 2022-04-15 NOTE — OP NOTE
Operative Note   Otolaryngology - Head and Neck Surgery       DATE OF OPERATION:   April 15, 2022    PREOPERATIVE DIAGNOSIS:   Subglottic stenosis      POSTOPERATIVE DIAGNOSIS:   Same    NAME OF OPERATION:   Flexible bronchoscopy with attempted steroid injection    ANESTHESIA  Type: local    SURGEON:   Nadine Hoffmann MD      INDICATIONS FOR PROCEDURE:   The patient is a 31 year old female with a history of subglottic stenosis. She is being brought to the Operating Room for subsequent treatment of the stenosis. She underwent flex bronch with CRE balloon dilation to 12mmHg on 11/5/19, 12/22/21 and 2/2/22. Now she is doing an awakes steroid injection series, first one on 3/18/22 and now comes for the second one. Risks, benefits, alternatives, and rationale for the procedure(s) listed above were discussed with the patient, and the patient wishes to proceed with surgery and has signed an informed consent.     FINDINGS:   Grade 1, 10% stenosis seen today. This was stable from last assessment.  >no cc of Kenalog administered because patient di not tolerate the camera past the vocal folds - did VT2 through the LNC, gave 7cc of 4% lidocaine, still has distal small scar band, had some nose bleeding, pledgets placed  >Jericho and Mainstem Bronchi: unremarkable     DESCRIPTION OF PROCEDURE:   The patient was brought into the operating room and  seated upright in the chair. Topical anesthesia was achieved by spraying 4% topical lidocaine directly onto the vocal folds and trachea. After adequate anesthesia was achieved, the flexible bronchoscope was passed through the glottis into the trachea.  Abnormalities were noted.  The jericho was visualized, followed by further insertion of the scope to the main stem bronchus level to evaluate the bronchi as well as the orifices of the sub-segmental bronchi. Then Kenalog steroid was injected into the stenosis in several quadrants. Having completed this the operation was completed and the scope  withdrawn atraumatically.     Post-op plan: will do daily voice therapy execises and attempt repeat injection  In the office on 4/29.    COMPLICATIONS:   None.  .   ESTIMATED BLOOD LOSS:   Less than 10cc    DISPOSITION:   PACU.    SPECIMENS:  * No specimens in log *       PHOTODOCUMENTATION:

## 2022-04-15 NOTE — DISCHARGE INSTRUCTIONS
[facility]  Home Care Following Your Procedure  Call a doctor if you have signs of infection (fever, growing tenderness at the surgery site, a large amount of drainage or bleeding, severe pain, foul-smelling drainage, redness, swelling).         Tylenol/Acetaminophen Consumption  To help encourage the safe use of acetaminophen, the makers of TYLENOL  have lowered the maximum daily dose for single-ingredient Extra Strength TYLENOL  (acetaminophen) products sold in the [place] from 8 pills per day (4,000 mg) to 6 pills per day (3,000 mg). The dosing interval has also changed from 2 pills every 4-6 hours to 2 pills every 6 hours.  If you feel your pain relief is insufficient, you may take Tylenol/Acetaminophen in addition to your narcotic pain medication.   Be careful not to exceed 3,000 mg of Tylenol/Acetaminophen in a 24 hour period from all sources.  If you are taking extra strength Tylenol/acetaminophen (500 mg), the maximum dose is 6 tablets in 24 hours.  If you are taking regular strength acetaminophen (325 mg), the maximum dose is 9 tablets in 24 hours.  Your doctor is:  Dr. Nadine Hoffmann, ENT Otolaryngology: [phone]                                    Or dial [phone] and ask for the resident on call for:  ENT Otolaryngology  For emergency care, call the:  [place]:  [phone] (TTY for hearing impaired: [phone])

## 2022-04-22 ENCOUNTER — PATIENT OUTREACH (OUTPATIENT)
Dept: OTOLARYNGOLOGY | Facility: CLINIC | Age: 32
End: 2022-04-22

## 2022-04-22 NOTE — PROGRESS NOTES
Left voicemail asking patient to read my chart sent by Dr. Hoffmann regarding getting her steroid injection rescheduled. Patient can also call back to discuss, whichever is more convenient for her.    Aneta Bonilla RN on 4/22/2022 at 4:09 PM

## 2022-04-27 NOTE — PROGRESS NOTES
Left additional voicemail for patient. Asked patient to reply to my chart so that we can work on finding her a new time for steroid injection. Asked her to reply or call back as soon as she can so that we can get her back in in a timely manner.     Aneta Bonilla RN on 4/27/2022 at 2:47 PM

## 2022-04-30 DIAGNOSIS — J31.0 CHRONIC RHINITIS: Primary | ICD-10-CM

## 2022-04-30 RX ORDER — FLUTICASONE PROPIONATE 50 MCG
2 SPRAY, SUSPENSION (ML) NASAL DAILY
Qty: 9.9 ML | Refills: 3 | Status: SHIPPED | OUTPATIENT
Start: 2022-04-30

## 2022-05-02 ENCOUNTER — PREP FOR PROCEDURE (OUTPATIENT)
Dept: OTOLARYNGOLOGY | Facility: CLINIC | Age: 32
End: 2022-05-02
Payer: COMMERCIAL

## 2022-05-02 DIAGNOSIS — J38.6 SUBGLOTTIC STENOSIS: Primary | ICD-10-CM

## 2022-05-15 ENCOUNTER — HEALTH MAINTENANCE LETTER (OUTPATIENT)
Age: 32
End: 2022-05-15

## 2022-05-16 DIAGNOSIS — Z11.59 ENCOUNTER FOR SCREENING FOR OTHER VIRAL DISEASES: Primary | ICD-10-CM

## 2022-05-24 ENCOUNTER — LAB (OUTPATIENT)
Dept: LAB | Facility: CLINIC | Age: 32
End: 2022-05-24
Attending: OTOLARYNGOLOGY
Payer: COMMERCIAL

## 2022-05-24 DIAGNOSIS — Z11.59 ENCOUNTER FOR SCREENING FOR OTHER VIRAL DISEASES: ICD-10-CM

## 2022-05-24 LAB — SARS-COV-2 RNA RESP QL NAA+PROBE: NEGATIVE

## 2022-05-24 PROCEDURE — 99000 SPECIMEN HANDLING OFFICE-LAB: CPT | Performed by: PATHOLOGY

## 2022-05-24 PROCEDURE — U0003 INFECTIOUS AGENT DETECTION BY NUCLEIC ACID (DNA OR RNA); SEVERE ACUTE RESPIRATORY SYNDROME CORONAVIRUS 2 (SARS-COV-2) (CORONAVIRUS DISEASE [COVID-19]), AMPLIFIED PROBE TECHNIQUE, MAKING USE OF HIGH THROUGHPUT TECHNOLOGIES AS DESCRIBED BY CMS-2020-01-R: HCPCS | Mod: 90 | Performed by: PATHOLOGY

## 2022-05-24 PROCEDURE — U0005 INFEC AGEN DETEC AMPLI PROBE: HCPCS | Mod: 90 | Performed by: PATHOLOGY

## 2022-05-27 ENCOUNTER — HOSPITAL ENCOUNTER (OUTPATIENT)
Facility: AMBULATORY SURGERY CENTER | Age: 32
Discharge: HOME | End: 2022-05-27
Attending: OTOLARYNGOLOGY | Admitting: OTOLARYNGOLOGY
Payer: COMMERCIAL

## 2022-05-27 ENCOUNTER — PREP FOR PROCEDURE (OUTPATIENT)
Dept: OTOLARYNGOLOGY | Facility: CLINIC | Age: 32
End: 2022-05-27
Payer: COMMERCIAL

## 2022-05-27 VITALS
HEART RATE: 81 BPM | WEIGHT: 270 LBS | RESPIRATION RATE: 16 BRPM | DIASTOLIC BLOOD PRESSURE: 93 MMHG | TEMPERATURE: 97 F | BODY MASS INDEX: 44.98 KG/M2 | OXYGEN SATURATION: 99 % | SYSTOLIC BLOOD PRESSURE: 142 MMHG | HEIGHT: 65 IN

## 2022-05-27 DIAGNOSIS — J38.6 SUBGLOTTIC STENOSIS: ICD-10-CM

## 2022-05-27 DIAGNOSIS — J38.6 SUBGLOTTIC STENOSIS: Primary | ICD-10-CM

## 2022-05-27 PROCEDURE — 31573 LARGSC W/THER INJECTION: CPT

## 2022-05-27 PROCEDURE — 31573 LARGSC W/THER INJECTION: CPT | Performed by: OTOLARYNGOLOGY

## 2022-05-27 RX ORDER — TRIAMCINOLONE ACETONIDE 40 MG/ML
INJECTION, SUSPENSION INTRA-ARTICULAR; INTRAMUSCULAR PRN
Status: DISCONTINUED | OUTPATIENT
Start: 2022-05-27 | End: 2022-05-27 | Stop reason: HOSPADM

## 2022-05-27 RX ORDER — LIDOCAINE HYDROCHLORIDE 40 MG/ML
SOLUTION TOPICAL PRN
Status: DISCONTINUED | OUTPATIENT
Start: 2022-05-27 | End: 2022-05-27 | Stop reason: HOSPADM

## 2022-05-27 RX ORDER — FLUTICASONE PROPIONATE 220 UG/1
2 AEROSOL, METERED RESPIRATORY (INHALATION) 2 TIMES DAILY
Qty: 12 G | Refills: 3 | Status: SHIPPED | OUTPATIENT
Start: 2022-05-27

## 2022-05-27 NOTE — DISCHARGE INSTRUCTIONS
[facility]  Home Care Following Your Procedure  Call a doctor if you have signs of infection (fever, growing tenderness at the surgery site, a large amount of drainage or bleeding, severe pain, foul-smelling drainage, redness, swelling).    No eating or drinking for 1 hour-- until 11 AM. Then you can resume eating and drinking as usual    Tylenol/Acetaminophen Consumption  To help encourage the safe use of acetaminophen, the makers of TYLENOL  have lowered the maximum daily dose for single-ingredient Extra Strength TYLENOL  (acetaminophen) products sold in the [place] from 8 pills per day (4,000 mg) to 6 pills per day (3,000 mg). The dosing interval has also changed from 2 pills every 4-6 hours to 2 pills every 6 hours.  If you feel your pain relief is insufficient, you may take Tylenol/Acetaminophen in addition to your narcotic pain medication.   Be careful not to exceed 3,000 mg of Tylenol/Acetaminophen in a 24 hour period from all sources.  If you are taking extra strength Tylenol/acetaminophen (500 mg), the maximum dose is 6 tablets in 24 hours.  If you are taking regular strength acetaminophen (325 mg), the maximum dose is 9 tablets in 24 hours.  Your doctor is:  Dr. Nadine Hoffmann, ENT Otolaryngology: [phone]                                    Or dial [phone] and ask for the resident on call for:  ENT Otolaryngology  For emergency care, call the:  [place]:  [phone] (TTY for hearing impaired: [phone])

## 2022-06-14 ENCOUNTER — LAB (OUTPATIENT)
Dept: LAB | Facility: CLINIC | Age: 32
End: 2022-06-14
Payer: COMMERCIAL

## 2022-06-14 DIAGNOSIS — Z20.822 ENCOUNTER FOR LABORATORY TESTING FOR COVID-19 VIRUS: ICD-10-CM

## 2022-06-14 LAB — SARS-COV-2 RNA RESP QL NAA+PROBE: NEGATIVE

## 2022-06-14 PROCEDURE — 99000 SPECIMEN HANDLING OFFICE-LAB: CPT | Performed by: PATHOLOGY

## 2022-06-14 PROCEDURE — U0005 INFEC AGEN DETEC AMPLI PROBE: HCPCS | Mod: 90 | Performed by: PATHOLOGY

## 2022-06-14 PROCEDURE — U0003 INFECTIOUS AGENT DETECTION BY NUCLEIC ACID (DNA OR RNA); SEVERE ACUTE RESPIRATORY SYNDROME CORONAVIRUS 2 (SARS-COV-2) (CORONAVIRUS DISEASE [COVID-19]), AMPLIFIED PROBE TECHNIQUE, MAKING USE OF HIGH THROUGHPUT TECHNOLOGIES AS DESCRIBED BY CMS-2020-01-R: HCPCS | Mod: 90 | Performed by: PATHOLOGY

## 2022-06-17 ENCOUNTER — HOSPITAL ENCOUNTER (OUTPATIENT)
Facility: AMBULATORY SURGERY CENTER | Age: 32
Discharge: HOME | End: 2022-06-17
Attending: OTOLARYNGOLOGY | Admitting: OTOLARYNGOLOGY
Payer: COMMERCIAL

## 2022-06-17 ENCOUNTER — PREP FOR PROCEDURE (OUTPATIENT)
Dept: OTOLARYNGOLOGY | Facility: CLINIC | Age: 32
End: 2022-06-17

## 2022-06-17 VITALS
TEMPERATURE: 97.2 F | DIASTOLIC BLOOD PRESSURE: 66 MMHG | WEIGHT: 256 LBS | RESPIRATION RATE: 16 BRPM | OXYGEN SATURATION: 95 % | HEART RATE: 82 BPM | HEIGHT: 65 IN | BODY MASS INDEX: 42.65 KG/M2 | SYSTOLIC BLOOD PRESSURE: 110 MMHG

## 2022-06-17 DIAGNOSIS — J38.6 SUBGLOTTIC STENOSIS: Primary | ICD-10-CM

## 2022-06-17 PROCEDURE — 31573 LARGSC W/THER INJECTION: CPT

## 2022-06-17 PROCEDURE — 31573 LARGSC W/THER INJECTION: CPT | Performed by: OTOLARYNGOLOGY

## 2022-06-17 RX ORDER — LIDOCAINE HYDROCHLORIDE 40 MG/ML
SOLUTION TOPICAL PRN
Status: DISCONTINUED | OUTPATIENT
Start: 2022-06-17 | End: 2022-06-17 | Stop reason: HOSPADM

## 2022-06-17 RX ORDER — TRIAMCINOLONE ACETONIDE 40 MG/ML
INJECTION, SUSPENSION INTRA-ARTICULAR; INTRAMUSCULAR PRN
Status: DISCONTINUED | OUTPATIENT
Start: 2022-06-17 | End: 2022-06-17 | Stop reason: HOSPADM

## 2022-06-17 NOTE — DISCHARGE INSTRUCTIONS
[facility]  Home Care Following Your Procedure  Call a doctor if you have signs of infection (fever, growing tenderness at the surgery site, a large amount of drainage or bleeding, severe pain, foul-smelling drainage, redness, swelling).         Tylenol/Acetaminophen Consumption  To help encourage the safe use of acetaminophen, the makers of TYLENOL  have lowered the maximum daily dose for single-ingredient Extra Strength TYLENOL  (acetaminophen) products sold in the [place] from 8 pills per day (4,000 mg) to 6 pills per day (3,000 mg). The dosing interval has also changed from 2 pills every 4-6 hours to 2 pills every 6 hours.  If you feel your pain relief is insufficient, you may take Tylenol/Acetaminophen in addition to your narcotic pain medication.   Be careful not to exceed 3,000 mg of Tylenol/Acetaminophen in a 24 hour period from all sources.  If you are taking extra strength Tylenol/acetaminophen (500 mg), the maximum dose is 6 tablets in 24 hours.  If you are taking regular strength acetaminophen (325 mg), the maximum dose is 9 tablets in 24 hours.  Your doctor is:  Dr. Nadine Hoffmann, ENT Otolaryngology: [phone]                                    Or dial [phone] and ask for the resident on call for:  ENT Otolaryngology  For emergency care, call the:  [place]:  [phone] (TTY for hearing impaired: [phone])

## 2022-06-17 NOTE — OP NOTE
Operative Note   Otolaryngology - Head and Neck Surgery       DATE OF OPERATION:   June 17, 2022    PREOPERATIVE DIAGNOSIS:   Subglottic stenosis      POSTOPERATIVE DIAGNOSIS:   Same    NAME OF OPERATION:   Flexible bronchoscopy and steroid injection for subglottic stenosis      ANESTHESIA  Type: local    SURGEON:   Nadine Hoffmann MD     INDICATIONS FOR PROCEDURE:   The patient is a 31 year old female with a history of subglottic stenosis. She is being brought to the Operating Room for subsequent treatment of the stenosis. She underwent flex bronch with CRE balloon dilation to 12mmHg on 11/5/19, 12/22/21, 2/2/22. Now she is doing an awakes steroid injection series, first one on 3/18/22, second one on 4/15/22,third one on 5/27/22. She returns for a third. Risks, benefits, alternatives, and rationale for the procedure(s) listed above were discussed with the patient, and the patient wishes to proceed with surgery and has signed an informed consent.       FINDINGS:   Grade 1 10% stenosis seen today. This was stable from last assessment. 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 Topical anesthesia was achieved by spraying 4% topical lidocaine directly onto the vocal folds and trachea. After adequate anesthesia was achieved, the flexible bronchoscope was passed through the glottis into the trachea.  Abnormalities were noted.  The jericho was visualized, followed by further insertion of the scope to the main stem bronchus level to evaluate the bronchi as well as the orifices of the sub-segmental bronchi. Then Kenalog steroid was injected into  the stenosis in several quadrants. Having completed this the operation was completed and the scope withdrawn atraumatically.     Post-op plan: repeat injection on 7/15    COMPLICATIONS:   None.  .   ESTIMATED BLOOD LOSS:   Less than 10cc    DISPOSITION:   PACU.    SPECIMENS:  * No specimens in log *       PHOTODOCUMENTATION:

## 2022-07-12 ENCOUNTER — LAB (OUTPATIENT)
Dept: LAB | Facility: CLINIC | Age: 32
End: 2022-07-12
Payer: COMMERCIAL

## 2022-07-12 DIAGNOSIS — Z20.822 ENCOUNTER FOR LABORATORY TESTING FOR COVID-19 VIRUS: ICD-10-CM

## 2022-07-12 LAB — SARS-COV-2 RNA RESP QL NAA+PROBE: NEGATIVE

## 2022-07-12 PROCEDURE — U0003 INFECTIOUS AGENT DETECTION BY NUCLEIC ACID (DNA OR RNA); SEVERE ACUTE RESPIRATORY SYNDROME CORONAVIRUS 2 (SARS-COV-2) (CORONAVIRUS DISEASE [COVID-19]), AMPLIFIED PROBE TECHNIQUE, MAKING USE OF HIGH THROUGHPUT TECHNOLOGIES AS DESCRIBED BY CMS-2020-01-R: HCPCS | Mod: 90 | Performed by: PATHOLOGY

## 2022-07-12 PROCEDURE — U0005 INFEC AGEN DETEC AMPLI PROBE: HCPCS | Mod: 90 | Performed by: PATHOLOGY

## 2022-07-12 PROCEDURE — 99000 SPECIMEN HANDLING OFFICE-LAB: CPT | Performed by: PATHOLOGY

## 2022-07-15 ENCOUNTER — HOSPITAL ENCOUNTER (OUTPATIENT)
Facility: AMBULATORY SURGERY CENTER | Age: 32
Discharge: HOME | End: 2022-07-15
Attending: OTOLARYNGOLOGY | Admitting: OTOLARYNGOLOGY
Payer: COMMERCIAL

## 2022-07-15 ENCOUNTER — PREP FOR PROCEDURE (OUTPATIENT)
Dept: OTOLARYNGOLOGY | Facility: CLINIC | Age: 32
End: 2022-07-15

## 2022-07-15 VITALS
RESPIRATION RATE: 16 BRPM | SYSTOLIC BLOOD PRESSURE: 117 MMHG | TEMPERATURE: 97.4 F | DIASTOLIC BLOOD PRESSURE: 73 MMHG | OXYGEN SATURATION: 96 %

## 2022-07-15 DIAGNOSIS — J38.6 SUBGLOTTIC STENOSIS: Primary | ICD-10-CM

## 2022-07-15 PROCEDURE — 31573 LARGSC W/THER INJECTION: CPT | Mod: GC | Performed by: OTOLARYNGOLOGY

## 2022-07-15 PROCEDURE — 31573 LARGSC W/THER INJECTION: CPT

## 2022-07-15 RX ORDER — LIDOCAINE HYDROCHLORIDE 40 MG/ML
SOLUTION TOPICAL PRN
Status: DISCONTINUED | OUTPATIENT
Start: 2022-07-15 | End: 2022-07-15 | Stop reason: HOSPADM

## 2022-07-15 RX ORDER — TRIAMCINOLONE ACETONIDE 40 MG/ML
INJECTION, SUSPENSION INTRA-ARTICULAR; INTRAMUSCULAR PRN
Status: DISCONTINUED | OUTPATIENT
Start: 2022-07-15 | End: 2022-07-15 | Stop reason: HOSPADM

## 2022-07-15 NOTE — OP NOTE
Operative Note   Otolaryngology - Head and Neck Surgery       DATE OF OPERATION:   July 15, 2022    PREOPERATIVE DIAGNOSIS:   Subglottic stenosis      POSTOPERATIVE DIAGNOSIS:   Same    NAME OF OPERATION:   Flexible bronchoscopy and steroid injection for subglottic stenosis      ANESTHESIA  Type: local    SURGEON:   Nadine Hoffmann MD    RESIDENT SURGEON(S):   Demian Gordon MD    INDICATIONS FOR PROCEDURE:   The patient is a 31 year old female with a history of subglottic stenosis. She is being brought to the Operating Room for subsequent treatment of the stenosis. She underwent flex bronch with CRE balloon dilation to 12mmHg on 11/5/19, 12/22/21, 2/2/22. Now she is doing an awakes steroid injection series, first one on 3/18/22, second one on 4/15/22,third one on 5/27/22. She had a third one on 6/17/22. Risks, benefits, alternatives, and rationale for the procedure(s) listed above were discussed with the patient, and the patient wishes to proceed with surgery and has signed an informed consent.     FINDINGS:   Grade 1, 20% stenosis seen today. This was stable from last assessment. 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 Topical anesthesia was achieved by spraying 4% topical lidocaine directly onto the vocal folds and trachea. After adequate anesthesia was achieved, the flexible bronchoscope was passed through the glottis into the trachea.  Abnormalities were noted.  The jericho was visualized, followed by further insertion of the scope to the main stem bronchus level to evaluate the bronchi as well as the orifices of the sub-segmental bronchi. Then Kenalog steroid was injected into the stenosis in  several quadrants. Having completed this the operation was completed and the scope withdrawn atraumatically.     Post-op plan: repeat injection on 8/23 at 7:30    COMPLICATIONS:   None.  .   ESTIMATED BLOOD LOSS:   Less than 10cc    DISPOSITION:   PACU.    SPECIMENS:  * No specimens in log *       PHOTODOCUMENTATION:

## 2022-07-15 NOTE — DISCHARGE INSTRUCTIONS
[facility]  Home Care Following Your Procedure  Call a doctor if you have signs of infection (fever, growing tenderness at the surgery site, a large amount of drainage or bleeding, severe pain, foul-smelling drainage, redness, swelling).         Tylenol/Acetaminophen Consumption  To help encourage the safe use of acetaminophen, the makers of TYLENOL  have lowered the maximum daily dose for single-ingredient Extra Strength TYLENOL  (acetaminophen) products sold in the [place] from 8 pills per day (4,000 mg) to 6 pills per day (3,000 mg). The dosing interval has also changed from 2 pills every 4-6 hours to 2 pills every 6 hours.  If you feel your pain relief is insufficient, you may take Tylenol/Acetaminophen in addition to your narcotic pain medication.   Be careful not to exceed 3,000 mg of Tylenol/Acetaminophen in a 24 hour period from all sources.  If you are taking extra strength Tylenol/acetaminophen (500 mg), the maximum dose is 6 tablets in 24 hours.  If you are taking regular strength acetaminophen (325 mg), the maximum dose is 9 tablets in 24 hours.  Your doctor is:  Dr. Nadine Hoffmann, ENT Otolaryngology: [phone]                                    Or dial [phone] and ask for the resident on call for:  ENT Otolaryngology  For emergency care, call the:  [place]:  [phone] (TTY for hearing impaired: [phone])

## 2022-08-23 ENCOUNTER — OFFICE VISIT (OUTPATIENT)
Dept: OTOLARYNGOLOGY | Facility: CLINIC | Age: 32
End: 2022-08-23
Payer: COMMERCIAL

## 2022-08-23 VITALS — DIASTOLIC BLOOD PRESSURE: 86 MMHG | HEIGHT: 65 IN | BODY MASS INDEX: 42.6 KG/M2 | SYSTOLIC BLOOD PRESSURE: 122 MMHG

## 2022-08-23 DIAGNOSIS — J38.6 SUBGLOTTIC STENOSIS: Primary | ICD-10-CM

## 2022-08-23 PROCEDURE — 31573 LARGSC W/THER INJECTION: CPT | Performed by: OTOLARYNGOLOGY

## 2022-08-23 PROCEDURE — 99207 PR NO CHARGE LOS: CPT | Performed by: OTOLARYNGOLOGY

## 2022-08-23 RX ORDER — TRIAMCINOLONE ACETONIDE 40 MG/ML
40 INJECTION, SUSPENSION INTRA-ARTICULAR; INTRAMUSCULAR ONCE
Status: COMPLETED | OUTPATIENT
Start: 2022-08-23 | End: 2022-08-23

## 2022-08-23 RX ADMIN — TRIAMCINOLONE ACETONIDE 40 MG: 40 INJECTION, SUSPENSION INTRA-ARTICULAR; INTRAMUSCULAR at 13:43

## 2022-08-23 ASSESSMENT — PAIN SCALES - GENERAL: PAINLEVEL: NO PAIN (0)

## 2022-08-23 NOTE — PROGRESS NOTES
Operative Note   Otolaryngology - Head and Neck Surgery       DATE OF OPERATION:   August 23, 2022    PREOPERATIVE DIAGNOSIS:   subglottic stenosis      POSTOPERATIVE DIAGNOSIS:   Same    NAME OF OPERATION:   Flexible bronchoscopy and steroid injection for subglottic stenosis      ANESTHESIA  Type: local    SURGEON:   Nadine Hoffmann MD     INDICATIONS FOR PROCEDURE:   The patient is a 31 year old female with a history of subglottic stenosis. She is being brought to the Operating Room for subsequent treatment of the stenosis. She underwent flex bronch with CRE balloon dilation to 12mmHg on 11/5/19, 12/22/21, 2/2/22. Now she is doing an awakes steroid injection series, first one on 3/18/22, second one on 4/15/22,third one on 5/27/22. She had a fourth one on 6/17/22 and a fifth one on 7/15/22. Risks, benefits, alternatives, and rationale for the procedure(s) listed above were discussed with the patient, and the patient wishes to proceed with surgery and has signed an informed consent.     FINDINGS:     Grade 1, 20% stenosis seen today. This was stable from last assessment. 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 Topical anesthesia was achieved by spraying 4% topical lidocaine directly onto the vocal folds and trachea. After adequate anesthesia was achieved, the flexible bronchoscope was passed through the glottis into the trachea.  Abnormalities were noted.  The jericho was visualized, followed by further insertion of the scope to the main stem bronchus level to evaluate the bronchi as well as the orifices of the sub-segmental bronchi. Then Kenalog steroid was injected into the stenosis in several  quadrants. Having completed this the operation was completed and the scope withdrawn atraumatically.     Post-op plan: Peak flow spirometry is stable at 375. We discussed continued weight loss management and she is in the final stage of approval for bariatric surgery. We discussed repeat injection versus observation, will proceed with 3 month observation and possible steroid injection at that time    Follow-up: Friday November 18th @ 11 AM     COMPLICATIONS:   None.  .   ESTIMATED BLOOD LOSS:   Less than 10cc    DISPOSITION:   PACU.    SPECIMENS:  * Cannot find log *       PHOTODOCUMENTATION:

## 2022-08-23 NOTE — LETTER
8/23/2022       RE: Alise Orona  [address] [address]     Dear Colleague,    Thank you for referring your patient, Alise Orona, to the [facility] [place] at [facility]. Please see a copy of my visit note below.    Operative Note   Otolaryngology - Head and Neck Surgery       DATE OF OPERATION:   August 23, 2022    PREOPERATIVE DIAGNOSIS:   subglottic stenosis      POSTOPERATIVE DIAGNOSIS:   Same    NAME OF OPERATION:   Flexible bronchoscopy and steroid injection for subglottic stenosis      ANESTHESIA  Type: local    SURGEON:   Nadine Hoffmann MD     INDICATIONS FOR PROCEDURE:   The patient is a 31 year old female with a history of subglottic stenosis. She is being brought to the Operating Room for subsequent treatment of the stenosis. She underwent flex bronch with CRE balloon dilation to 12mmHg on 11/5/19, 12/22/21, 2/2/22. Now she is doing an awakes steroid injection series, first one on 3/18/22, second one on 4/15/22,third one on 5/27/22. She had a fourth one on 6/17/22 and a fifth one on 7/15/22. Risks, benefits, alternatives, and rationale for the procedure(s) listed above were discussed with the patient, and the patient wishes to proceed with surgery and has signed an informed consent.     FINDINGS:     Grade 1, 20% stenosis seen today. This was stable from last assessment. 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 Topical anesthesia was achieved by spraying 4% topical lidocaine directly onto the vocal folds and trachea. After adequate anesthesia was achieved,  the flexible bronchoscope was passed through the glottis into the trachea.  Abnormalities were noted.  The jericho was visualized, followed by further insertion of the scope to the main stem bronchus level to evaluate the bronchi as well as the orifices of the sub-segmental bronchi. Then Kenalog steroid was injected into the stenosis in several quadrants. Having completed this the operation was completed and the scope withdrawn atraumatically.     Post-op plan: Peak flow spirometry is stable at 375. We discussed continued weight loss management and she is in the final stage of approval for bariatric surgery. We discussed repeat injection versus observation, will proceed with 3 month observation and possible steroid injection at that time    Follow-up: Friday November 18th @ 11 AM     COMPLICATIONS:   None.  .   ESTIMATED BLOOD LOSS:   Less than 10cc    DISPOSITION:   PACU.    SPECIMENS:  * Cannot find log *       PHOTODOCUMENTATION:                      Again, thank you for allowing me to participate in the care of your patient.      Sincerely,    Nadine Hoffmann MD

## 2022-08-23 NOTE — PATIENT INSTRUCTIONS
You were seen in the ENT Clinic today by Dr. Hoffmann. If you have any questions or concerns after your appointment, please contact us (see below)      2.   Please return to clinic   Friday November 18th @ 11 AM         How to Contact Us:  Send a [employer] message to your provider. Our team will respond to you via [employer] Occasionally, we will need to call you to get further information.  For urgent matters (Monday-Friday), call the ENT Clinic: [phone] and speak with a call center team member - they will route your call appropriately.   If you'd like to speak directly with a nurse, please find our contact information below. We do our best to check voicemail frequently throughout the day, and will work to call you back within 1-2 days. For urgent matters, please use the general clinic phone numbers listed above.      STEPHANIE Dawson  ENT RN Care Coordinator      Gloria SOTO LPN  Direct: [phone]

## 2022-08-23 NOTE — PROGRESS NOTES
[facility]   at the [facility]   Otolaryngology Clinic     Patient: Alise Orona    MRN: [id number]    : 1990    Age/Gender: 32 year old female  Date of Service: 2022  Rendering Provider:   Nadine Hoffmann MD     Chief Complaint   Subglottic stenosis   Dyspnea  CRE balloon dilation to 12mmHg on 19, 21, 22  S/p awake steroid, 3/18/22, 4/15/22, 22, 22, 7/15/22  Interval History   HISTORY OF PRESENT ILLNESS: Ms. Orona is a 32 year old female is being followed for subglottic stenosis. she was initially seen on 2019. Please refer to this note for full history.     Of note she is now s/p injection on 7/15/22    Today, she presents for follow up. she reports:  - ***     PAST MEDICAL HISTORY:   Past Medical History:   Diagnosis Date     Attention deficit hyperactivity disorder (ADHD), predominantly inattentive type 2015     Binge-eating disorder, mild 2015     Family history of thyroid cancer 2014     Generalized anxiety disorder 11/15/2013     Major depressive disorder, recurrent episode, in full remission (H) 2014     Menorrhagia 10/2/2012     Obesity 2014     Ovarian cyst 10/2/2012     Screening for malignant neoplasm of cervix 2014-2012 NILM  NILM, hpv negative  NILM     27 y.o. Plan:   Cotesting 2020    BV  ; Pap test history     Subglottic stenosis 2019    Added automatically from request for surgery [id number]       PAST SURGICAL HISTORY:   Past Surgical History:   Procedure Laterality Date     GYN SURGERY      lap removal of ovarian cyst      INJECT STEROID (LOCATION) N/A 2019    Procedure: INJECTION, STEROID;  Surgeon: Nadine Ayers MD;  Location: [facility] OR     INJECT STEROID (LOCATION) N/A 2021    Procedure: steroid injection;  Surgeon: Nadine Hoffmann MD;  Location: [facility] OR     LARYNGOSCOPY, FLEXIBLE WITH INJECTION N/A 2022    Procedure: LARYNGOSCOPY, FLEXIBLE WITH INJECTION;   Surgeon: Nadine Hoffmann MD;  Location: [facility] OR     LARYNGOSCOPY, FLEXIBLE WITH INJECTION N/A 3/18/2022    Procedure: LARYNGOSCOPY, FLEXIBLE WITH INJECTION;  Surgeon: Nadine Hoffmann MD;  Location: [facility] OR     LARYNGOSCOPY, FLEXIBLE WITH INJECTION N/A 4/15/2022    Procedure: LARYNGOSCOPY, FLEXIBLE;  Surgeon: Nadine Hoffmann MD;  Location: [facility] OR     LARYNGOSCOPY, FLEXIBLE WITH INJECTION N/A 5/27/2022    Procedure: LARYNGOSCOPY, FLEXIBLE WITH INJECTION;  Surgeon: Nadine Hoffmann MD;  Location: [facility] OR     LARYNGOSCOPY, FLEXIBLE WITH INJECTION N/A 6/17/2022    Procedure: LARYNGOSCOPY, FLEXIBLE WITH INJECTION;  Surgeon: Nadine Hoffmann MD;  Location: [facility] OR     LARYNGOSCOPY, FLEXIBLE WITH INJECTION N/A 7/15/2022    Procedure: LARYNGOSCOPY, FLEXIBLE WITH INJECTION;  Surgeon: Nadine Hoffmann MD;  Location: [facility] OR     LASER CO2 LARYNGOSCOPY N/A 12/22/2021    Procedure: flexible bronchoscopy, carbon dioxide laser resection of stenosis, CRE balloon dilation;  Surgeon: Nadine Hoffmann MD;  Location: [facility] OR     LASER CO2 LARYNGOSCOPY, COMPLEX N/A 11/5/2019    Procedure: microdirect laryngoscopy, flexible bronchoscopy, flexible CO2 laser laryngoscopy with excision of stenosis, laryngoscopy with dilation, flexible esophagoscopy;  Surgeon: Nadine Ayers MD;  Location: [facility] OR     WISDOM TEETH EXTRACTION         CURRENT MEDICATIONS:   Current Outpatient Medications:      acetaminophen (TYLENOL) 325 MG tablet, Take 2 tablets (650 mg) by mouth every 4 hours as needed for mild pain, Disp: 50 tablet, Rfl: 0     acetaminophen (TYLENOL) 325 MG tablet, Take 2 tablets (650 mg) by mouth every 4 hours as needed for mild pain, Disp: 50 tablet, Rfl: 0     buPROPion (WELLBUTRIN XL) 300 MG 24 hr tablet, Take 300 mg by mouth every morning , Disp: , Rfl:      busPIRone (BUSPAR) 10 MG tablet, Take by mouth 2 times daily , Disp: , Rfl:      busPIRone (BUSPAR) 5 MG tablet, Take one (1) tablet by mouth every morning AND two (2) tablets at bedtime, Disp: , Rfl:       clonazePAM (KLONOPIN) 0.5 MG tablet, Take one half (0.5) tablets by mouth twice a day AND two (2) tablets at bedtime, Disp: , Rfl:      clonazePAM (KLONOPIN) 1 MG tablet, Take 1 mg by mouth nightly as needed, Disp: , Rfl:      Escitalopram Oxalate (LEXAPRO PO), Take 20 mg by mouth daily, Disp: , Rfl:      fluticasone (FLONASE) 50 MCG/ACT nasal spray, Spray 2 sprays into both nostrils daily, Disp: 9.9 mL, Rfl: 3     fluticasone (FLOVENT HFA) 220 MCG/ACT inhaler, Inhale 2 puffs into the lungs 2 times daily, Disp: 12 g, Rfl: 3     magnesium gluconate (MAGONATE) 250 MG tablet, Take 400 mg by mouth daily, Disp: , Rfl:      norgestimate-ethinyl estradiol (ORTHO-CYCLEN, SPRINTEC) 0.25-35 MG-MCG tablet, Take 1 tablet by mouth daily, Disp: , Rfl:      omeprazole (PRILOSEC) 40 MG DR capsule, Take 1 capsule (40 mg) by mouth daily before breakfast, Disp: 90 capsule, Rfl: 3     ondansetron (ZOFRAN-ODT) 4 MG ODT tab, Take 1-2 tablets (4-8 mg) by mouth every 8 hours as needed for nausea, Disp: 4 tablet, Rfl: 0     oxyCODONE (ROXICODONE) 5 MG tablet, Take 1 tablet (5 mg) by mouth every 6 hours as needed for severe pain, Disp: 6 tablet, Rfl: 0     sulfamethoxazole-trimethoprim (BACTRIM DS) 800-160 MG tablet, Take 1 tablet by mouth 2 times daily, Disp: 14 tablet, Rfl: 0     sulfamethoxazole-trimethoprim (BACTRIM DS) 800-160 MG tablet, Take 1 tablet by mouth 2 times daily, Disp: 20 tablet, Rfl: 0     VYVANSE 10 MG capsule, Take one (1) capsule by mouth every morning, Disp: , Rfl:     ALLERGIES: Patient has no known allergies.    SOCIAL HISTORY:    Social History     Socioeconomic History     Marital status: Single     Spouse name: Not on file     Number of children: Not on file     Years of education: Not on file     Highest education level: Not on file   Occupational History     Not on file   Tobacco Use     Smoking status: Never Smoker     Smokeless tobacco: Never Used   Substance and Sexual Activity     Alcohol use: Yes      Comment: occas     Drug use: Never     Sexual activity: Not on file   Other Topics Concern     Parent/sibling w/ CABG, MI or angioplasty before 65F 55M? Not Asked   Social History Narrative     Not on file     Social Determinants of Health     Financial Resource Strain: Not on file   Food Insecurity: Not on file   Transportation Needs: Not on file   Physical Activity: Not on file   Stress: Not on file   Social Connections: Not on file   Intimate Partner Violence: Not on file   Housing Stability: Not on file         FAMILY HISTORY:   Family History   Problem Relation Age of Onset     Depression Mother      Substance Abuse Father      Depression Maternal Grandmother      Substance Abuse Sister      Depression Other      Bipolar Disorder Other      Anxiety Disorder Other      Schizophrenia Other      Substance Abuse Other       Non-contributory for problems with anesthesia    REVIEW OF SYSTEMS:   The patient was asked a 14 point review of systems regarding constitutional symptoms, eye symptoms, ears, nose, mouth, throat symptoms, cardiovascular symptoms, respiratory symptoms, gastrointestinal symptoms, genitourinary symptoms, musculoskeletal symptoms, integumentary symptoms, neurological symptoms, psychiatric symptoms, endocrine symptoms, hematologic/lymphatic symptoms, and allergic/ immunologic symptoms.   The pertinent factors have been included in the HPI and below.  Patient Supplied Answers to Review of Systems   ENT ROS 1/17/2022   Psychology: -   Ears, Nose, Throat: Nasal congestion or drainage   Cardiopulmonary: -       Physical Examination   The patient underwent a physical examination as described below. The pertinent positive and negative findings are summarized after the description of the examination.  Constitutional: The patient's developmental and nutritional status was assessed. The patient's voice quality was assessed.  Head and Face: The head and face were inspected for deformities. The sinuses were  palpated. The salivary glands were palpated. Facial muscle strength was assessed bilaterally.  Eyes: Extraocular movements and primary gaze alignment were assessed.  Ears, Nose, Mouth and Throat: The ears and nose were examined for deformities. The nasal septum, mucosa, and turbinates were inspected by anterior rhinoscopy. The lips, teeth, and gums were examined for abnormalities. The oral mucosa, tongue, palate, tonsils, lateral and posterior pharynx were inspected for the presence of asymmetry or mucosal lesions.    Neck: The tracheal position was noted, and the neck mass palpated to determine if there were any asymmetries, abnormal neck masses, thyromegally, or thyroid nodules.  Respiratory: The nature of the breathing and chest expansion/symmetry was observed.  Cardiovascular: The patient was examined to determine the presence of any edema or jugular venous distension.  Abdomen: The contour of the abdomen was noted.  Lymphatic: The patient was examined for infraclavicular lymphadenopathy.  Musculoskeletal: The patient was inspected for the presence of skeletal deformities.  Extremities: The extremities were examined for any clubbing or cyanosis.  Skin: The skin was examined for inflammatory or neoplastic conditions.  Neurologic: The patient's orientation, mood, and affect were noted. The cranial nerve  functions were examined.  Other pertinent positive and negative findings on physical examination:   OC/OP: no lesions, uvula midline, soft palate elevates symmetrically   Neck: no lesions, no TH tenderness to palpation  ***  All other physical examination findings were within normal limits and noncontributory.    Procedures   ***    Review of Relevant Clinical Data   I personally reviewed:  Labs:  No results found for: TSH  No results found for: NA, CO2, BUN, CREAT, GLUCOSE, PHOS  No results found for: WBC, HGB, HCT, MCV, PLT  No results found for: PT, PTT, INR  No results found for: TERI  No components found for:  "RHEUMATOIDFACTOR,  RF  No results found for: CRP  No components found for: CKTOT, URICACID  No components found for: C3, C4, DSDNAAB, NDNAABIFA  No results found for: MPOAB    Patient reported Quality of Life (QOL) Measures   Patient Supplied Answers To VHI Questionnaire  Voice Handicap Index (VHI-10) 1/2/2020   My voice makes it difficult for people to hear me 1   People have difficulty understanding me in a noisy room 1   My voice difficulties restrict my personal and social life.  0   I feel left out of conversations because of my voice 0   My voice problem causes me to lose income 0   I feel as though I have to strain to produce voice 0   The clarity of my voice is unpredictable 0   My voice problem upsets me 0   My voice makes me feel handicapped 0   People ask, \"What's wrong with your voice?\" 0   VHI-10 2         Patient Supplied Answers To EAT Questionnaire  Eating Assessment Tool (EAT-10) 1/17/2022   My swallowing problem has caused me to lose weight 0   My swallowing problem interferes with my ability to go out for meals 0   Swallowing liquids takes extra effort 0   Swallowing solids takes extra effort 0   Swallowing pills takes extra effort 0   Swallowing is painful 0   The pleasure of eating is affected by my swallowing 0   When I swallow food sticks in my throat 0   I cough when I eat 0   Swallowing is stressful 0   EAT-10 0         Patient Supplied Answers To CSI Questionnaire  Cough Severity Index (CSI) 1/18/2022   My cough is worse when I lie down 0   My coughing problem causes me to restrict my personal and social life 0   I tend to avoid places because of my cough problem 0   I feel embarrassed because of my coughing problem 2   People ask, ''What's wrong?'' because I cough a lot 1   I run out of air when I cough 0   My coughing problem affects my voice 0   My coughing problem limits my physical activity 2   My coughing problem upsets me 2   People ask me if I am sick because I cough a lot 1   CSI " Score 8         Impression & Plan     IMPRESSION: Ms. Orona is a 32 year old female who is being seen for the following:       Dyspnea  - due to subglottic stenosis   - intubation history possible in 2012 due to an ovarian cyst - not sure  - diabetes - HgA1c 12/2/21 - 5.7   - autoimmune labs - from 10/21/19 - TERI, RF, ANCA - negative  - biopsy none  - imaging none  - patient symptoms with dyspnea  - scope showed grade 3 subglottic stenosis on 11/1/19  - discussed etiology of trauma (intubation), autoimmune, diabetes or idiopathic. In this case this is most likely DM and prior intubation induced  - discussed treatment with observation, conservative management with oral abx/steroids/reflux precautions, steroid injections, endoscopic procedures, open resection and bypass procedure with tracheotomy  - patient elects to proceed with endoscopic surgery  - s/p  MDL with CO2 laser resection, dilation and steroid injection on 11/5/19  - symptoms have come back for the past 6 months (reported today 12/2/21)  - scope today shows grade 3, 80% narrowing subglottically  - peak flow meter today is 200  - discussed she needs another dilation after which we will discuss long term management again to establish goals and a follow up plan  - discussed given her severity of stenosis as well as her weight, her surgery is high risk and she is at risk of a trach   - s/p flex bronch with CO2 laser and dilation to 12 mmHg 12/22/2021  - symptoms today 01/18/2022 are improved with better breathing, peak flow meter is 375  - scope today 01/18/2022 shows subglottic stenosis improved, grade 1, 20% narrowing, some mild white granulation anteriorly, right vocal fold slight paresis, minimal restriction on the right   - discussed weight management and diabetic control   - discussed steroid injection, risks, benefits and alternatives were discussed  - S/p awake steroid, 3/18/22, 4/15/22, 5/27/22, 6/17/22, 7/15/22  - symptoms 8/23/2022 are ***  -  scope shows ***  Plan  - ***    RETURN VISIT: ***    Scribe Disclosure:  I, Kit Hays am serving as a scribe to document services personally performed by Nadine Hoffmann MD at this visit, based upon the provider's statements to me. All documentation has been reviewed by the aforementioned provider prior to being entered into the official medical record.     Nadine Hoffmann MD    Laryngology    [facility]  Department of  Otolaryngology - Head and Neck Surgery  [facility] [place] [address]  Appointment line: [phone]  Fax: [phone]  [email or web address]

## 2022-08-23 NOTE — ADDENDUM NOTE
Addended by: RONNI MARRERO on: 8/23/2022 01:45 PM     Modules accepted: Orders     same name as above

## 2022-09-11 ENCOUNTER — HEALTH MAINTENANCE LETTER (OUTPATIENT)
Age: 32
End: 2022-09-11

## 2022-11-17 ENCOUNTER — TELEPHONE (OUTPATIENT)
Dept: OTOLARYNGOLOGY | Facility: CLINIC | Age: 32
End: 2022-11-17

## 2022-11-17 NOTE — TELEPHONE ENCOUNTER
Left vm message for patient in regards to rescheduling appt scheduled for 11/18 as provider will be unavailable. Writers call back number provided on vm, also gave patient the option of responding to my chart message from provider.

## 2022-12-14 NOTE — PROGRESS NOTES
[facility]   at the [facility]   Otolaryngology Clinic     Patient: Alise Orona    MRN: [id number]    : 1990    Age/Gender: 32 year old female  Date of Service: 12/15/2022  Rendering Provider:   Nadine Hoffmann MD     Chief Complaint   Subglottic stenosis  S/p MDL with CO2 laser resection, dilation and steroid injection on 19, 2021, 22  S/p steroid injection 3/18/22, 4/15/22, 22, 22, 7/15/22, 22   Interval History   HISTORY OF PRESENT ILLNESS: Ms. Orona is a 32 year old female is being followed for subglottic stenosis. s/p MDL with CO2 laser resection and dilation with steroid injection on 11/15/19. She was initially seen on 2019 before her surgery. Please refer to 2021 note for full history.     Today, she presents for follow up. she reports:  - peak flow is 375  - is undergoing gastrectomy sleeve on Monday ()     PAST MEDICAL HISTORY:   Past Medical History:   Diagnosis Date     Attention deficit hyperactivity disorder (ADHD), predominantly inattentive type 2015     Binge-eating disorder, mild 2015     Family history of thyroid cancer 2014     Generalized anxiety disorder 11/15/2013     Major depressive disorder, recurrent episode, in full remission (H) 2014     Menorrhagia 10/2/2012     Obesity 2014     Ovarian cyst 10/2/2012     Screening for malignant neoplasm of cervix 2014-2012 NILM  NILM, hpv negative  NILM     27 y.o. Plan:   Cotesting 2020    BV  ; Pap test history     Subglottic stenosis 2019    Added automatically from request for surgery [id number]       PAST SURGICAL HISTORY:   Past Surgical History:   Procedure Laterality Date     GYN SURGERY      lap removal of ovarian cyst      INJECT STEROID (LOCATION) N/A 2019    Procedure: INJECTION, STEROID;  Surgeon: Nadine Ayers MD;  Location: [facility] OR     INJECT STEROID (LOCATION) N/A 2021    Procedure:  steroid injection;  Surgeon: Nadine Hoffmann MD;  Location: [facility] OR     LARYNGOSCOPY, FLEXIBLE WITH INJECTION N/A 2/2/2022    Procedure: LARYNGOSCOPY, FLEXIBLE WITH INJECTION;  Surgeon: Nadine Hoffmann MD;  Location: [facility] OR     LARYNGOSCOPY, FLEXIBLE WITH INJECTION N/A 3/18/2022    Procedure: LARYNGOSCOPY, FLEXIBLE WITH INJECTION;  Surgeon: Nadine Hoffmann MD;  Location: [facility] OR     LARYNGOSCOPY, FLEXIBLE WITH INJECTION N/A 4/15/2022    Procedure: LARYNGOSCOPY, FLEXIBLE;  Surgeon: Nadine Hoffmann MD;  Location: [facility] OR     LARYNGOSCOPY, FLEXIBLE WITH INJECTION N/A 5/27/2022    Procedure: LARYNGOSCOPY, FLEXIBLE WITH INJECTION;  Surgeon: Nadine Hoffmann MD;  Location: [facility] OR     LARYNGOSCOPY, FLEXIBLE WITH INJECTION N/A 6/17/2022    Procedure: LARYNGOSCOPY, FLEXIBLE WITH INJECTION;  Surgeon: Nadine Hoffmann MD;  Location: [facility] OR     LARYNGOSCOPY, FLEXIBLE WITH INJECTION N/A 7/15/2022    Procedure: LARYNGOSCOPY, FLEXIBLE WITH INJECTION;  Surgeon: Nadine Hoffmann MD;  Location: [facility] OR     LASER CO2 LARYNGOSCOPY N/A 12/22/2021    Procedure: flexible bronchoscopy, carbon dioxide laser resection of stenosis, CRE balloon dilation;  Surgeon: Nadine Hoffmann MD;  Location: [facility] OR     LASER CO2 LARYNGOSCOPY, COMPLEX N/A 11/5/2019    Procedure: microdirect laryngoscopy, flexible bronchoscopy, flexible CO2 laser laryngoscopy with excision of stenosis, laryngoscopy with dilation, flexible esophagoscopy;  Surgeon: Nadine Ayers MD;  Location: [facility] OR     WISDOM TEETH EXTRACTION         CURRENT MEDICATIONS:   Current Outpatient Medications:      acetaminophen (TYLENOL) 325 MG tablet, Take 2 tablets (650 mg) by mouth every 4 hours as needed for mild pain, Disp: 50 tablet, Rfl: 0     acetaminophen (TYLENOL) 325 MG tablet, Take 2 tablets (650 mg) by mouth every 4 hours as needed for mild pain, Disp: 50 tablet, Rfl: 0     buPROPion (WELLBUTRIN XL) 300 MG 24 hr tablet, Take 300 mg by mouth every morning , Disp: , Rfl:      busPIRone (BUSPAR) 10 MG  tablet, Take by mouth 2 times daily , Disp: , Rfl:      busPIRone (BUSPAR) 5 MG tablet, Take one (1) tablet by mouth every morning AND two (2) tablets at bedtime, Disp: , Rfl:      clonazePAM (KLONOPIN) 0.5 MG tablet, Take one half (0.5) tablets by mouth twice a day AND two (2) tablets at bedtime, Disp: , Rfl:      clonazePAM (KLONOPIN) 1 MG tablet, Take 1 mg by mouth nightly as needed, Disp: , Rfl:      Escitalopram Oxalate (LEXAPRO PO), Take 20 mg by mouth daily, Disp: , Rfl:      fluticasone (FLONASE) 50 MCG/ACT nasal spray, Spray 2 sprays into both nostrils daily, Disp: 9.9 mL, Rfl: 3     fluticasone (FLOVENT HFA) 220 MCG/ACT inhaler, Inhale 2 puffs into the lungs 2 times daily, Disp: 12 g, Rfl: 3     magnesium gluconate (MAGONATE) 250 MG tablet, Take 400 mg by mouth daily, Disp: , Rfl:      norgestimate-ethinyl estradiol (ORTHO-CYCLEN, SPRINTEC) 0.25-35 MG-MCG tablet, Take 1 tablet by mouth daily, Disp: , Rfl:      omeprazole (PRILOSEC) 40 MG DR capsule, Take 1 capsule (40 mg) by mouth daily before breakfast, Disp: 90 capsule, Rfl: 3     ondansetron (ZOFRAN-ODT) 4 MG ODT tab, Take 1-2 tablets (4-8 mg) by mouth every 8 hours as needed for nausea, Disp: 4 tablet, Rfl: 0     oxyCODONE (ROXICODONE) 5 MG tablet, Take 1 tablet (5 mg) by mouth every 6 hours as needed for severe pain, Disp: 6 tablet, Rfl: 0     sulfamethoxazole-trimethoprim (BACTRIM DS) 800-160 MG tablet, Take 1 tablet by mouth 2 times daily, Disp: 14 tablet, Rfl: 0     sulfamethoxazole-trimethoprim (BACTRIM DS) 800-160 MG tablet, Take 1 tablet by mouth 2 times daily, Disp: 20 tablet, Rfl: 0     VYVANSE 10 MG capsule, Take one (1) capsule by mouth every morning, Disp: , Rfl:     ALLERGIES: Patient has no known allergies.    SOCIAL HISTORY:    Social History     Socioeconomic History     Marital status: Single     Spouse name: Not on file     Number of children: Not on file     Years of education: Not on file     Highest education level: Not on file    Occupational History     Not on file   Tobacco Use     Smoking status: Never     Smokeless tobacco: Never   Substance and Sexual Activity     Alcohol use: Yes     Comment: occas     Drug use: Never     Sexual activity: Not on file   Other Topics Concern     Parent/sibling w/ CABG, MI or angioplasty before 65F 55M? Not Asked   Social History Narrative     Not on file     Social Determinants of Health     Financial Resource Strain: Not on file   Food Insecurity: Not on file   Transportation Needs: Not on file   Physical Activity: Not on file   Stress: Not on file   Social Connections: Not on file   Intimate Partner Violence: Not on file   Housing Stability: Not on file         FAMILY HISTORY:   Family History   Problem Relation Age of Onset     Depression Mother      Substance Abuse Father      Depression Maternal Grandmother      Substance Abuse Sister      Depression Other      Bipolar Disorder Other      Anxiety Disorder Other      Schizophrenia Other      Substance Abuse Other       Non-contributory for problems with anesthesia    REVIEW OF SYSTEMS:   The patient was asked a 14 point review of systems regarding constitutional symptoms, eye symptoms, ears, nose, mouth, throat symptoms, cardiovascular symptoms, respiratory symptoms, gastrointestinal symptoms, genitourinary symptoms, musculoskeletal symptoms, integumentary symptoms, neurological symptoms, psychiatric symptoms, endocrine symptoms, hematologic/lymphatic symptoms, and allergic/ immunologic symptoms.   The pertinent factors have been included in the HPI and below.  Patient Supplied Answers to Review of Systems   ENT ROS 1/17/2022   Psychology: -   Ears, Nose, Throat: Nasal congestion or drainage   Cardiopulmonary: -       Physical Examination   The patient underwent a physical examination as described below. The pertinent positive and negative findings are summarized after the description of the examination.  Constitutional: The patient's  developmental and nutritional status was assessed. The patient's voice quality was assessed.  Head and Face: The head and face were inspected for deformities. The sinuses were palpated. The salivary glands were palpated. Facial muscle strength was assessed bilaterally.  Eyes: Extraocular movements and primary gaze alignment were assessed.  Ears, Nose, Mouth and Throat: The ears and nose were examined for deformities. The nasal septum, mucosa, and turbinates were inspected by anterior rhinoscopy. The lips, teeth, and gums were examined for abnormalities. The oral mucosa, tongue, palate, tonsils, lateral and posterior pharynx were inspected for the presence of asymmetry or mucosal lesions.    Neck: The tracheal position was noted, and the neck mass palpated to determine if there were any asymmetries, abnormal neck masses, thyromegally, or thyroid nodules.  Respiratory: The nature of the breathing and chest expansion/symmetry was observed.  Cardiovascular: The patient was examined to determine the presence of any edema or jugular venous distension.  Abdomen: The contour of the abdomen was noted.  Lymphatic: The patient was examined for infraclavicular lymphadenopathy.  Musculoskeletal: The patient was inspected for the presence of skeletal deformities.  Extremities: The extremities were examined for any clubbing or cyanosis.  Skin: The skin was examined for inflammatory or neoplastic conditions.  Neurologic: The patient's orientation, mood, and affect were noted. The cranial nerve  functions were examined.  Other pertinent positive and negative findings on physical examination:   OC/OP: no lesions, uvula midline, soft palate elevates symmetrically   Neck: no lesions, no TH tenderness to palpation  All other physical examination findings were within normal limits and noncontributory.    Procedures   Flexible laryngoscopy (CPT 57438)      Pre-procedure diagnosis: dysphonia  Post-procedure diagnosis: same as  above  Indication for procedure: Ms. Orona is a 32 year old female with see above  Procedure(s): Fiberoptic Laryngoscopy    Details of Procedure: After informed consent was obtained, the patient was seated in the examination chair.  The areas of the nasopharynx as well as the hypopharynx were anesthetized with topical 4% lidocaine with 0.25% phenylephrine atomizer.  Examination of the base of tongue was performed first.  Attention was directed to any evidence of masses in the area or evidence of leukoplakia or candidal infection.  Attention was directed to the epiglottis where its size and position was determined and its movement on phonation of the vowel  e .  The piriform sinuses were then inspected for any mass lesions or pooling of secretions.  Attention was then directed to the larynx. The vocal folds were inspected for infection or any areas of leukoplakia, for masses, polypoid degeneration, or hemorrhage.  Having done this, the arytenoids and vocal processes were inspected for erythema or evidence of granuloma formation.  The posterior commissure was then inspected for evidence of inflammatory changes in the mucosa and heaping up of mucosal tissue. The patient was then instructed to say the vowel  e .  Adduction of vocal folds to the midline was observed for any evidence of paresis or paralysis of the larynx or asymmetry in rotation of the larynx to the left or right. The patient was asked to breathe and the degree of abduction was noted bilaterally.  Subglottic view of the larynx was obtained for any additional mass lesions or mucosal changes.  Finally the post cricoid was examined for evidence of pooling of secretions, as well as the pharyngeal wall mucosa.   Anesthesia type: 0.25% phenylephrine    Findings:  Anatomic/physiological deviations: RNC, grade 2 60% stenosis   Right vocal process: No restriction of mobility   Left vocal process: No restriction of mobility  Glottal gap: Complete glottal  "closure  Supraglottic structures: Normal  Hypopharynx: Normal     Estimated Blood Loss: minimal  Complications: None  Disposition: Patient tolerated the procedure well               Review of Relevant Clinical Data   I personally reviewed:    Labs:  No results found for: TSH  No results found for: NA, CO2, BUN, CREAT, GLUCOSE, PHOS  No results found for: WBC, HGB, HCT, MCV, PLT  No results found for: PT, PTT, INR  No results found for: TERI  No components found for: RHEUMATOIDFACTOR,  RF  No results found for: CRP  No components found for: CKTOT, URICACID  No components found for: C3, C4, DSDNAAB, NDNAABIFA  No results found for: MPOAB    Patient reported Quality of Life (QOL) Measures   Patient Supplied Answers To VHI Questionnaire  Voice Handicap Index (VHI-10) 1/2/2020   My voice makes it difficult for people to hear me 1   People have difficulty understanding me in a noisy room 1   My voice difficulties restrict my personal and social life.  0   I feel left out of conversations because of my voice 0   My voice problem causes me to lose income 0   I feel as though I have to strain to produce voice 0   The clarity of my voice is unpredictable 0   My voice problem upsets me 0   My voice makes me feel handicapped 0   People ask, \"What's wrong with your voice?\" 0   VHI-10 2         Patient Supplied Answers To EAT Questionnaire  Eating Assessment Tool (EAT-10) 1/17/2022   My swallowing problem has caused me to lose weight 0   My swallowing problem interferes with my ability to go out for meals 0   Swallowing liquids takes extra effort 0   Swallowing solids takes extra effort 0   Swallowing pills takes extra effort 0   Swallowing is painful 0   The pleasure of eating is affected by my swallowing 0   When I swallow food sticks in my throat 0   I cough when I eat 0   Swallowing is stressful 0   EAT-10 0         Patient Supplied Answers To CSI Questionnaire  Cough Severity Index (CSI) 1/18/2022   My cough is worse when I " lie down 0   My coughing problem causes me to restrict my personal and social life 0   I tend to avoid places because of my cough problem 0   I feel embarrassed because of my coughing problem 2   People ask, ''What's wrong?'' because I cough a lot 1   I run out of air when I cough 0   My coughing problem affects my voice 0   My coughing problem limits my physical activity 2   My coughing problem upsets me 2   People ask me if I am sick because I cough a lot 1   CSI Score 8         Patient Supplied Answers to Dyspnea Index Questionnaire:  Dyspnea Index 2022   1. I have trouble getting air in. 1   2. I feel tightness in my throat when I am having angel breathing problem. 0   3. It takes more effort to breathe than it used to. 2   4. Change in weather affect my breathing problem. 3   5. My breathing gets worse with stress. 1   6. I make sound/noise breathing in 2   7. I have to strain to breathe. 1   8. My shortness of breath gets worse with exercise or physical activity 3   9. My breathing problem makes me feel stressed. 2   10. My breathing problem casuses me to restrict my personal and social life. 0   Dyspnea Index Total Score 15       Impression & Plan     IMPRESSION: Ms. Orona is a 32 year old female who is being seen for the followin.    Dyspnea  - due to subglottic stenosis   - intubation history possible in  due to an ovarian cyst - not sure  - diabetes - HgA1c 21 - 5.7   - autoimmune labs - from 10/21/19 - TERI, RF, ANCA - negative  - biopsy none  - imaging none  - patient symptoms with dyspnea  - scope showed grade 3 subglottic stenosis on 19  - discussed etiology of trauma (intubation), autoimmune, diabetes or idiopathic. In this case this is most likely DM and prior intubation induced  - discussed treatment with observation, conservative management with oral abx/steroids/reflux precautions, steroid injections, endoscopic procedures, open resection and bypass procedure with  tracheotomy  - patient elects to proceed with endoscopic surgery  - s/p  MDL with CO2 laser resection, dilation and steroid injection on 11/5/19  - symptoms have come back for the past 6 months (reported today 12/2/21)  - scope today shows grade 3, 80% narrowing subglottically  - peak flow meter today is 200  - discussed she needs another dilation after which we will discuss long term management again to establish goals and a follow up plan  - discussed given her severity of stenosis as well as her weight, her surgery is high risk and she is at risk of a trach   - s/p flex bronch with CO2 laser and dilation to 12 mmHg 12/22/2021  - symptoms today 01/18/2022 are improved with better breathing, peak flow meter is 375  - scope today 01/18/2022 shows subglottic stenosis improved, grade 1, 20% narrowing, some mild white granulation anteriorly, right vocal fold slight paresis, minimal restriction on the right   - discussed weight management and diabetic control   - discussed steroid injection, risks, benefits and alternatives were discussed  - symptoms 12/14/2022 are good with stable peak flow of 375  - she is undergoing vertical gastric sleeve on 12/19/22  - scope shows grade 2 60% narrowing  - discussed that there is some narrowing and concern for safely placing endotracheal tube through. Would recommend using an LMA if possible and if not there is the risk of not being able to intubate and having to abort the procedure.   - As such would recommend predilation prior to surgery. Since her surgery is on Monday she would need this tomorrow.   - discussed that with her current stenosis a size 5 intubation may not work on Monday and therefore we could pre-dilate her tomorrow prior to surgery  - called and spoke to her anesthesia team and they informed me they are unable to use LMA and would prefer size 6.0 trach. Therefore we will proceed with predilation to prepare for passage of size 6.0 tube ( [facility] (# is  [phone]))  Plan  - surgery tomorrow  - also provide letter for anesthesia: Patient with subglottic stenosis would recommend intubation with size 6.0 or less as well as perioperative steroids. Will recommend dexamethasone 10 mg IV     2. Dysphonia  - feels like she does not know how to breathe when she is talking   - had voice complaints in 2019 when she was first seen  - strobe at the time showed supraglottic hyperfunction, asymmetric vocal fold vibration and thickening   - scope today shows supraglottic hyperfunction and right vocal fold paresis   - will need close observation for the paresis   Plan  - observation      RETURN VISIT: Surgery tomorrow    Scribe Disclosure:  I, Kit Hays, am serving as a scribe to document services personally performed by Nadine Hoffmann MD based on data collection and the provider's statements to me.     Nadine Hoffmann MD    Laryngology    [facility]  Department of  Otolaryngology - Head and Neck Surgery  Clinics & Surgery Center  [address] [place] [address]  Appointment line: [phone]  Fax: [phone]  [email or web address]

## 2022-12-15 ENCOUNTER — OFFICE VISIT (OUTPATIENT)
Dept: OTOLARYNGOLOGY | Facility: CLINIC | Age: 32
End: 2022-12-15
Payer: COMMERCIAL

## 2022-12-15 ENCOUNTER — PREP FOR PROCEDURE (OUTPATIENT)
Dept: OTOLARYNGOLOGY | Facility: CLINIC | Age: 32
End: 2022-12-15

## 2022-12-15 VITALS
OXYGEN SATURATION: 93 % | BODY MASS INDEX: 43 KG/M2 | SYSTOLIC BLOOD PRESSURE: 128 MMHG | HEART RATE: 88 BPM | WEIGHT: 258.1 LBS | DIASTOLIC BLOOD PRESSURE: 72 MMHG | HEIGHT: 65 IN

## 2022-12-15 DIAGNOSIS — J38.6 SUBGLOTTIC STENOSIS: Primary | ICD-10-CM

## 2022-12-15 PROCEDURE — 31575 DIAGNOSTIC LARYNGOSCOPY: CPT | Performed by: OTOLARYNGOLOGY

## 2022-12-15 PROCEDURE — 99215 OFFICE O/P EST HI 40 MIN: CPT | Mod: 25 | Performed by: OTOLARYNGOLOGY

## 2022-12-15 RX ORDER — MOLNUPIRAVIR 200 MG/1
CAPSULE ORAL
COMMUNITY
Start: 2022-11-12 | End: 2022-12-15

## 2022-12-15 RX ORDER — PIMECROLIMUS 10 MG/G
CREAM TOPICAL 2 TIMES DAILY
COMMUNITY
Start: 2022-09-30

## 2022-12-15 RX ORDER — CEFAZOLIN SODIUM 2 G/50ML
2 SOLUTION INTRAVENOUS SEE ADMIN INSTRUCTIONS
Status: CANCELLED | OUTPATIENT
Start: 2022-12-15

## 2022-12-15 RX ORDER — CEFAZOLIN SODIUM 2 G/50ML
2 SOLUTION INTRAVENOUS
Status: CANCELLED | OUTPATIENT
Start: 2022-12-15

## 2022-12-15 ASSESSMENT — PAIN SCALES - GENERAL: PAINLEVEL: NO PAIN (0)

## 2022-12-15 NOTE — PATIENT INSTRUCTIONS
Surgery Teaching    1. Someone from our scheduling department will call you within approximately one week to get you scheduled with your provider for surgery. If no one has called you in one week, please notify us.    2. You must have a physical exam (called  history and physical ) within 30 days of surgery. You may complete this with your primary care provider.   A. If your provider is outside of the [facility] network please have them complete the preoperative forms provided to you in the surgery packet you will be mailed and be sure to have your provider fax them to the appropriate location prior to surgery. For surgery at the [facility] the fax number is:[phone]. For surgery at the [facility] the fax number is [phone].  B. In some cases we may have you see our Preoperative Assessment Center. If we have expressed this to you, our  will set up your appointment with them when they call to set up your surgery.    3. For same-day surgery, you must arrange for an adult to take you home from the Center. An adult must stay with you for the first 24 hours after surgery. You cannot drive for 24 hours.     4. Ask your doctor what medicines are safe before surgery. For over the counter medications and supplements it is advised that you do NOT TAKE MOTRIN, IBUPROFEN, ASPIRIN, ALEVE, GARLIC SUPPLEMENTS or FISH OIL x 7 days prior to surgery (to prevent excess bleeding and bruising at time of surgery). If your provider advises you to take any medication the morning of surgery you should take this with a sip of water.    5. A few days prior to surgery a nurse will call you to review your health history and instructions for before and after surgery. They will give you your final arrival time based upon your scheduled arrival time for surgery.    6. Call the surgical team if there's any change in your health prior to surgery. Things you should call for include but are not limited to signs of a cold or the flu (sore  throat, runny nose, cough, rash, fever). Other things to notify them for is for any open wounds (cuts, scrapes, scratches) near to the surgery site.    7. If you drink alcohol, stop drinking alcohol at least 24 hours before surgery.    8. If you smoke, stop or at least cut down on smoking 24 hours before surgery.    9.Take a bath or shower the night before and the morning of surgery (as told by your surgeon). Use an antiseptic soap. If your doctor does not give you special soap, buy Hibiclens or Chica-Stat at the drug store or ask the pharmacist to suggest a brand. You will wash with this from the neck down, washing your hair and face as you would normally.   A. When you are done with your shower please be sure to use clean towels to dry with, have clean linens on your bed, and put on clean clothes each time.   B. DO NOT put on lotion, powder, perfume, deodorant or make-up after bathing.    10. You can eat a normal meal the night before surgery. Do not eat any solid foods or drink any milk products for 8 hours before surgery.     11. You may drink clear liquids until 2 hours before surgery. Clear liquids include water, Gatorade, apple juice and liquids you can see through.    12. No eating or drinking 2 hours prior to surgery until after surgery. Your post op team will review any diet limitations you might have and when you can start eating and drinking again after surgery.      If you have any questions before or after surgery please call:    SARAVANAN Palacio  [facility]  Department of Otolaryngology  [phone]

## 2022-12-15 NOTE — LETTER
December 15, 2022    RE:  Alise Orona                              [address] [address]      To Whom It May Concern:    This letter is written to document that Alise Orona is under the medical care of Dr. Nadine Hoffmann of the [facility] at the [facility] outpatient clinics for the treatment of subglottic stenosis.    In the the event of any procedures under anesthesia, it is my recommendation that the smallest endotracheal tube possible be used(size <6 if possible) to provide adequate ventilation. Please limit anesthesia time if all possible.    I would also recommend perioperative steroids, dexamethasone 10 mg IV.    If there are questions or concerns regarding the plan of care, please contact the [facility] Clinic at [phone].    Sincerely,    Nadine Hoffmann MD    Laryngology    [facility]  Department of  Otolaryngology - Head and Neck Surgery  Clinics & Surgery Center  [address] [place] [address]  Appointment line: [phone]  Fax: [phone]

## 2022-12-15 NOTE — LETTER
12/15/2022       RE: Alise Orona  [address] [facility] [address] [place] [id number]     Dear Colleague,    Thank you for referring your patient, Alise Orona, to the [facility] EAR NOSE AND THROAT CLINIC [place] [facility]. Please see a copy of my visit note below.        [facility]   at the [facility]   Otolaryngology Clinic     Patient: Alise Orona    MRN: [id number]    : 1990    Age/Gender: 32 year old female  Date of Service: 12/15/2022  Rendering Provider:   Nadine Hoffmann MD     Chief Complaint   Subglottic stenosis  S/p MDL with CO2 laser resection, dilation and steroid injection on 19, 2021, 22  S/p steroid injection 3/18/22, 4/15/22, 22, 22, 7/15/22, 22   Interval History   HISTORY OF PRESENT ILLNESS: Ms. Orona is a 32 year old female is being followed for subglottic stenosis. s/p MDL with CO2 laser resection and dilation with steroid injection on 11/15/19. She was initially seen on 2019 before her surgery. Please refer to 2021 note for full history.     Today, she presents for follow up. she reports:  - peak flow is 375  - is undergoing gastrectomy sleeve on Monday ()     PAST MEDICAL HISTORY:   Past Medical History:   Diagnosis Date     Attention deficit hyperactivity disorder (ADHD), predominantly inattentive type 2015     Binge-eating disorder, mild 2015     Family history of thyroid cancer 2014     Generalized anxiety disorder 11/15/2013     Major depressive disorder, recurrent episode, in full remission (H) 2014     Menorrhagia 10/2/2012     Obesity 2014     Ovarian cyst 10/2/2012     Screening for malignant neoplasm of cervix 2014-2012 NILM 2014 NILM, hpv negative  NILM     27 y.o. Plan:   Cotesting 2020    BV  ; Pap test history     Subglottic stenosis 2019    Added automatically from  request for surgery [id number]       PAST SURGICAL HISTORY:   Past Surgical History:   Procedure Laterality Date     GYN SURGERY      lap removal of ovarian cyst      INJECT STEROID (LOCATION) N/A 11/5/2019    Procedure: INJECTION, STEROID;  Surgeon: Nadine Ayers MD;  Location: [facility] OR     INJECT STEROID (LOCATION) N/A 12/22/2021    Procedure: steroid injection;  Surgeon: Nadine Hoffmann MD;  Location: [facility] OR     LARYNGOSCOPY, FLEXIBLE WITH INJECTION N/A 2/2/2022    Procedure: LARYNGOSCOPY, FLEXIBLE WITH INJECTION;  Surgeon: Nadine Hoffmann MD;  Location: [facility] OR     LARYNGOSCOPY, FLEXIBLE WITH INJECTION N/A 3/18/2022    Procedure: LARYNGOSCOPY, FLEXIBLE WITH INJECTION;  Surgeon: Nadine Hoffmann MD;  Location: [facility] OR     LARYNGOSCOPY, FLEXIBLE WITH INJECTION N/A 4/15/2022    Procedure: LARYNGOSCOPY, FLEXIBLE;  Surgeon: Nadine Hoffmann MD;  Location: [facility] OR     LARYNGOSCOPY, FLEXIBLE WITH INJECTION N/A 5/27/2022    Procedure: LARYNGOSCOPY, FLEXIBLE WITH INJECTION;  Surgeon: Nadine Hoffmann MD;  Location: [facility] OR     LARYNGOSCOPY, FLEXIBLE WITH INJECTION N/A 6/17/2022    Procedure: LARYNGOSCOPY, FLEXIBLE WITH INJECTION;  Surgeon: Nadine Hoffmann MD;  Location: [facility] OR     LARYNGOSCOPY, FLEXIBLE WITH INJECTION N/A 7/15/2022    Procedure: LARYNGOSCOPY, FLEXIBLE WITH INJECTION;  Surgeon: Nadine Hoffmann MD;  Location: [facility] OR     LASER CO2 LARYNGOSCOPY N/A 12/22/2021    Procedure: flexible bronchoscopy, carbon dioxide laser resection of stenosis, CRE balloon dilation;  Surgeon: Nadine Hoffmann MD;  Location: [facility] OR     LASER CO2 LARYNGOSCOPY, COMPLEX N/A 11/5/2019    Procedure: microdirect laryngoscopy, flexible bronchoscopy, flexible CO2 laser laryngoscopy with excision of stenosis, laryngoscopy with dilation, flexible esophagoscopy;  Surgeon: Nadine Ayers MD;  Location: [facility] OR     WISDOM TEETH EXTRACTION         CURRENT MEDICATIONS:   Current Outpatient Medications:      acetaminophen (TYLENOL) 325 MG tablet, Take 2 tablets (650 mg)  by mouth every 4 hours as needed for mild pain, Disp: 50 tablet, Rfl: 0     acetaminophen (TYLENOL) 325 MG tablet, Take 2 tablets (650 mg) by mouth every 4 hours as needed for mild pain, Disp: 50 tablet, Rfl: 0     buPROPion (WELLBUTRIN XL) 300 MG 24 hr tablet, Take 300 mg by mouth every morning , Disp: , Rfl:      busPIRone (BUSPAR) 10 MG tablet, Take by mouth 2 times daily , Disp: , Rfl:      busPIRone (BUSPAR) 5 MG tablet, Take one (1) tablet by mouth every morning AND two (2) tablets at bedtime, Disp: , Rfl:      clonazePAM (KLONOPIN) 0.5 MG tablet, Take one half (0.5) tablets by mouth twice a day AND two (2) tablets at bedtime, Disp: , Rfl:      clonazePAM (KLONOPIN) 1 MG tablet, Take 1 mg by mouth nightly as needed, Disp: , Rfl:      Escitalopram Oxalate (LEXAPRO PO), Take 20 mg by mouth daily, Disp: , Rfl:      fluticasone (FLONASE) 50 MCG/ACT nasal spray, Spray 2 sprays into both nostrils daily, Disp: 9.9 mL, Rfl: 3     fluticasone (FLOVENT HFA) 220 MCG/ACT inhaler, Inhale 2 puffs into the lungs 2 times daily, Disp: 12 g, Rfl: 3     magnesium gluconate (MAGONATE) 250 MG tablet, Take 400 mg by mouth daily, Disp: , Rfl:      norgestimate-ethinyl estradiol (ORTHO-CYCLEN, SPRINTEC) 0.25-35 MG-MCG tablet, Take 1 tablet by mouth daily, Disp: , Rfl:      omeprazole (PRILOSEC) 40 MG DR capsule, Take 1 capsule (40 mg) by mouth daily before breakfast, Disp: 90 capsule, Rfl: 3     ondansetron (ZOFRAN-ODT) 4 MG ODT tab, Take 1-2 tablets (4-8 mg) by mouth every 8 hours as needed for nausea, Disp: 4 tablet, Rfl: 0     oxyCODONE (ROXICODONE) 5 MG tablet, Take 1 tablet (5 mg) by mouth every 6 hours as needed for severe pain, Disp: 6 tablet, Rfl: 0     sulfamethoxazole-trimethoprim (BACTRIM DS) 800-160 MG tablet, Take 1 tablet by mouth 2 times daily, Disp: 14 tablet, Rfl: 0     sulfamethoxazole-trimethoprim (BACTRIM DS) 800-160 MG tablet, Take 1 tablet by mouth 2 times daily, Disp: 20 tablet, Rfl: 0     VYVANSE 10 MG  capsule, Take one (1) capsule by mouth every morning, Disp: , Rfl:     ALLERGIES: Patient has no known allergies.    SOCIAL HISTORY:    Social History     Socioeconomic History     Marital status: Single     Spouse name: Not on file     Number of children: Not on file     Years of education: Not on file     Highest education level: Not on file   Occupational History     Not on file   Tobacco Use     Smoking status: Never     Smokeless tobacco: Never   Substance and Sexual Activity     Alcohol use: Yes     Comment: occas     Drug use: Never     Sexual activity: Not on file   Other Topics Concern     Parent/sibling w/ CABG, MI or angioplasty before 65F 55M? Not Asked   Social History Narrative     Not on file     Social Determinants of Health     Financial Resource Strain: Not on file   Food Insecurity: Not on file   Transportation Needs: Not on file   Physical Activity: Not on file   Stress: Not on file   Social Connections: Not on file   Intimate Partner Violence: Not on file   Housing Stability: Not on file         FAMILY HISTORY:   Family History   Problem Relation Age of Onset     Depression Mother      Substance Abuse Father      Depression Maternal Grandmother      Substance Abuse Sister      Depression Other      Bipolar Disorder Other      Anxiety Disorder Other      Schizophrenia Other      Substance Abuse Other       Non-contributory for problems with anesthesia    REVIEW OF SYSTEMS:   The patient was asked a 14 point review of systems regarding constitutional symptoms, eye symptoms, ears, nose, mouth, throat symptoms, cardiovascular symptoms, respiratory symptoms, gastrointestinal symptoms, genitourinary symptoms, musculoskeletal symptoms, integumentary symptoms, neurological symptoms, psychiatric symptoms, endocrine symptoms, hematologic/lymphatic symptoms, and allergic/ immunologic symptoms.   The pertinent factors have been included in the HPI and below.  Patient Supplied Answers to Review of  Systems   ENT ROS 1/17/2022   Psychology: -   Ears, Nose, Throat: Nasal congestion or drainage   Cardiopulmonary: -       Physical Examination   The patient underwent a physical examination as described below. The pertinent positive and negative findings are summarized after the description of the examination.  Constitutional: The patient's developmental and nutritional status was assessed. The patient's voice quality was assessed.  Head and Face: The head and face were inspected for deformities. The sinuses were palpated. The salivary glands were palpated. Facial muscle strength was assessed bilaterally.  Eyes: Extraocular movements and primary gaze alignment were assessed.  Ears, Nose, Mouth and Throat: The ears and nose were examined for deformities. The nasal septum, mucosa, and turbinates were inspected by anterior rhinoscopy. The lips, teeth, and gums were examined for abnormalities. The oral mucosa, tongue, palate, tonsils, lateral and posterior pharynx were inspected for the presence of asymmetry or mucosal lesions.    Neck: The tracheal position was noted, and the neck mass palpated to determine if there were any asymmetries, abnormal neck masses, thyromegally, or thyroid nodules.  Respiratory: The nature of the breathing and chest expansion/symmetry was observed.  Cardiovascular: The patient was examined to determine the presence of any edema or jugular venous distension.  Abdomen: The contour of the abdomen was noted.  Lymphatic: The patient was examined for infraclavicular lymphadenopathy.  Musculoskeletal: The patient was inspected for the presence of skeletal deformities.  Extremities: The extremities were examined for any clubbing or cyanosis.  Skin: The skin was examined for inflammatory or neoplastic conditions.  Neurologic: The patient's orientation, mood, and affect were noted. The cranial nerve  functions were examined.  Other pertinent positive and negative findings on physical examination:    OC/OP: no lesions, uvula midline, soft palate elevates symmetrically   Neck: no lesions, no TH tenderness to palpation  All other physical examination findings were within normal limits and noncontributory.    Procedures   Flexible laryngoscopy (CPT 97049)      Pre-procedure diagnosis: dysphonia  Post-procedure diagnosis: same as above  Indication for procedure: Ms. Orona is a 32 year old female with see above  Procedure(s): Fiberoptic Laryngoscopy    Details of Procedure: After informed consent was obtained, the patient was seated in the examination chair.  The areas of the nasopharynx as well as the hypopharynx were anesthetized with topical 4% lidocaine with 0.25% phenylephrine atomizer.  Examination of the base of tongue was performed first.  Attention was directed to any evidence of masses in the area or evidence of leukoplakia or candidal infection.  Attention was directed to the epiglottis where its size and position was determined and its movement on phonation of the vowel  e .  The piriform sinuses were then inspected for any mass lesions or pooling of secretions.  Attention was then directed to the larynx. The vocal folds were inspected for infection or any areas of leukoplakia, for masses, polypoid degeneration, or hemorrhage.  Having done this, the arytenoids and vocal processes were inspected for erythema or evidence of granuloma formation.  The posterior commissure was then inspected for evidence of inflammatory changes in the mucosa and heaping up of mucosal tissue. The patient was then instructed to say the vowel  e .  Adduction of vocal folds to the midline was observed for any evidence of paresis or paralysis of the larynx or asymmetry in rotation of the larynx to the left or right. The patient was asked to breathe and the degree of abduction was noted bilaterally.  Subglottic view of the larynx was obtained for any additional mass lesions or mucosal changes.  Finally the post cricoid was  "examined for evidence of pooling of secretions, as well as the pharyngeal wall mucosa.   Anesthesia type: 0.25% phenylephrine    Findings:  Anatomic/physiological deviations: RNC, grade 2 60% stenosis   Right vocal process: No restriction of mobility   Left vocal process: No restriction of mobility  Glottal gap: Complete glottal closure  Supraglottic structures: Normal  Hypopharynx: Normal     Estimated Blood Loss: minimal  Complications: None  Disposition: Patient tolerated the procedure well               Review of Relevant Clinical Data   I personally reviewed:    Labs:  No results found for: TSH  No results found for: NA, CO2, BUN, CREAT, GLUCOSE, PHOS  No results found for: WBC, HGB, HCT, MCV, PLT  No results found for: PT, PTT, INR  No results found for: TERI  No components found for: RHEUMATOIDFACTOR,  RF  No results found for: CRP  No components found for: CKTOT, URICACID  No components found for: C3, C4, DSDNAAB, NDNAABIFA  No results found for: MPOAB    Patient reported Quality of Life (QOL) Measures   Patient Supplied Answers To VHI Questionnaire  Voice Handicap Index (VHI-10) 1/2/2020   My voice makes it difficult for people to hear me 1   People have difficulty understanding me in a noisy room 1   My voice difficulties restrict my personal and social life.  0   I feel left out of conversations because of my voice 0   My voice problem causes me to lose income 0   I feel as though I have to strain to produce voice 0   The clarity of my voice is unpredictable 0   My voice problem upsets me 0   My voice makes me feel handicapped 0   People ask, \"What's wrong with your voice?\" 0   VHI-10 2         Patient Supplied Answers To EAT Questionnaire  Eating Assessment Tool (EAT-10) 1/17/2022   My swallowing problem has caused me to lose weight 0   My swallowing problem interferes with my ability to go out for meals 0   Swallowing liquids takes extra effort 0   Swallowing solids takes extra effort 0   Swallowing " pills takes extra effort 0   Swallowing is painful 0   The pleasure of eating is affected by my swallowing 0   When I swallow food sticks in my throat 0   I cough when I eat 0   Swallowing is stressful 0   EAT-10 0         Patient Supplied Answers To CSI Questionnaire  Cough Severity Index (CSI) 2022   My cough is worse when I lie down 0   My coughing problem causes me to restrict my personal and social life 0   I tend to avoid places because of my cough problem 0   I feel embarrassed because of my coughing problem 2   People ask, ''What's wrong?'' because I cough a lot 1   I run out of air when I cough 0   My coughing problem affects my voice 0   My coughing problem limits my physical activity 2   My coughing problem upsets me 2   People ask me if I am sick because I cough a lot 1   CSI Score 8         Patient Supplied Answers to Dyspnea Index Questionnaire:  Dyspnea Index 2022   1. I have trouble getting air in. 1   2. I feel tightness in my throat when I am having angel breathing problem. 0   3. It takes more effort to breathe than it used to. 2   4. Change in weather affect my breathing problem. 3   5. My breathing gets worse with stress. 1   6. I make sound/noise breathing in 2   7. I have to strain to breathe. 1   8. My shortness of breath gets worse with exercise or physical activity 3   9. My breathing problem makes me feel stressed. 2   10. My breathing problem casuses me to restrict my personal and social life. 0   Dyspnea Index Total Score 15       Impression & Plan     IMPRESSION: Ms. Orona is a 32 year old female who is being seen for the followin.    Dyspnea  - due to subglottic stenosis   - intubation history possible in  due to an ovarian cyst - not sure  - diabetes - HgA1c 21 - 5.7   - autoimmune labs - from 10/21/19 - TERI, RF, ANCA - negative  - biopsy none  - imaging none  - patient symptoms with dyspnea  - scope showed grade 3 subglottic stenosis on 19  - discussed  etiology of trauma (intubation), autoimmune, diabetes or idiopathic. In this case this is most likely DM and prior intubation induced  - discussed treatment with observation, conservative management with oral abx/steroids/reflux precautions, steroid injections, endoscopic procedures, open resection and bypass procedure with tracheotomy  - patient elects to proceed with endoscopic surgery  - s/p  MDL with CO2 laser resection, dilation and steroid injection on 11/5/19  - symptoms have come back for the past 6 months (reported today 12/2/21)  - scope today shows grade 3, 80% narrowing subglottically  - peak flow meter today is 200  - discussed she needs another dilation after which we will discuss long term management again to establish goals and a follow up plan  - discussed given her severity of stenosis as well as her weight, her surgery is high risk and she is at risk of a trach   - s/p flex bronch with CO2 laser and dilation to 12 mmHg 12/22/2021  - symptoms today 01/18/2022 are improved with better breathing, peak flow meter is 375  - scope today 01/18/2022 shows subglottic stenosis improved, grade 1, 20% narrowing, some mild white granulation anteriorly, right vocal fold slight paresis, minimal restriction on the right   - discussed weight management and diabetic control   - discussed steroid injection, risks, benefits and alternatives were discussed  - symptoms 12/14/2022 are good with stable peak flow of 375  - she is undergoing vertical gastric sleeve on 12/19/22  - scope shows grade 2 60% narrowing  - discussed that there is some narrowing and concern for safely placing endotracheal tube through. Would recommend using an LMA if possible and if not there is the risk of not being able to intubate and having to abort the procedure.   - As such would recommend predilation prior to surgery. Since her surgery is on Monday she would need this tomorrow.   - discussed that with her current stenosis a size 5  intubation may not work on Monday and therefore we could pre-dilate her tomorrow prior to surgery  - called and spoke to her anesthesia team and they informed me they are unable to use LMA and would prefer size 6.0 trach. Therefore we will proceed with predilation to prepare for passage of size 6.0 tube ( [facility] (# is [phone]))  Plan  - surgery tomorrow  - also provide letter for anesthesia: Patient with subglottic stenosis would recommend intubation with size 6.0 or less as well as perioperative steroids. Will recommend dexamethasone 10 mg IV     2. Dysphonia  - feels like she does not know how to breathe when she is talking   - had voice complaints in 2019 when she was first seen  - strobe at the time showed supraglottic hyperfunction, asymmetric vocal fold vibration and thickening   - scope today shows supraglottic hyperfunction and right vocal fold paresis   - will need close observation for the paresis   Plan  - observation      RETURN VISIT: Surgery tomorrow    Scribe Disclosure:  I, Kit Hays, am serving as a scribe to document services personally performed by Nadine Hoffmann MD based on data collection and the provider's statements to me.     Nadine Hoffmann MD    Laryngology    [facility]  Department of  Otolaryngology - Head and Neck Surgery  Clinics & Surgery Center  [address] [place] [address]  Appointment line: [phone]  Fax: [phone]  [email or web address]

## 2022-12-16 ENCOUNTER — ANESTHESIA (OUTPATIENT)
Dept: SURGERY | Facility: CLINIC | Age: 32
End: 2022-12-16
Payer: COMMERCIAL

## 2022-12-16 ENCOUNTER — ANESTHESIA EVENT (OUTPATIENT)
Dept: SURGERY | Facility: CLINIC | Age: 32
End: 2022-12-16
Payer: COMMERCIAL

## 2022-12-16 ENCOUNTER — HOSPITAL ENCOUNTER (OUTPATIENT)
Facility: CLINIC | Age: 32
Discharge: HOME | End: 2022-12-16
Attending: OTOLARYNGOLOGY | Admitting: OTOLARYNGOLOGY
Payer: COMMERCIAL

## 2022-12-16 VITALS
TEMPERATURE: 97.5 F | HEART RATE: 67 BPM | BODY MASS INDEX: 43.27 KG/M2 | RESPIRATION RATE: 16 BRPM | HEIGHT: 65 IN | OXYGEN SATURATION: 99 % | WEIGHT: 259.7 LBS | DIASTOLIC BLOOD PRESSURE: 72 MMHG | SYSTOLIC BLOOD PRESSURE: 119 MMHG

## 2022-12-16 DIAGNOSIS — T88.4XXA HARD TO INTUBATE, INITIAL ENCOUNTER: Primary | ICD-10-CM

## 2022-12-16 LAB — GLUCOSE BLDC GLUCOMTR-MCNC: 98 MG/DL (ref 70–99)

## 2022-12-16 PROCEDURE — 250N000011 HC RX IP 250 OP 636: Performed by: NURSE ANESTHETIST, CERTIFIED REGISTERED

## 2022-12-16 PROCEDURE — 272N000001 HC OR GENERAL SUPPLY STERILE: Performed by: OTOLARYNGOLOGY

## 2022-12-16 PROCEDURE — 82962 GLUCOSE BLOOD TEST: CPT

## 2022-12-16 PROCEDURE — 250N000011 HC RX IP 250 OP 636: Performed by: OTOLARYNGOLOGY

## 2022-12-16 PROCEDURE — 250N000009 HC RX 250: Performed by: NURSE ANESTHETIST, CERTIFIED REGISTERED

## 2022-12-16 PROCEDURE — 710N000010 HC RECOVERY PHASE 1, LEVEL 2, PER MIN: Performed by: OTOLARYNGOLOGY

## 2022-12-16 PROCEDURE — 31573 LARGSC W/THER INJECTION: CPT | Mod: 22 | Performed by: OTOLARYNGOLOGY

## 2022-12-16 PROCEDURE — 250N000009 HC RX 250: Performed by: OTOLARYNGOLOGY

## 2022-12-16 PROCEDURE — 999N000141 HC STATISTIC PRE-PROCEDURE NURSING ASSESSMENT: Performed by: OTOLARYNGOLOGY

## 2022-12-16 PROCEDURE — 31641 BRONCHOSCOPY TREAT BLOCKAGE: CPT | Mod: 22 | Performed by: OTOLARYNGOLOGY

## 2022-12-16 PROCEDURE — 710N000012 HC RECOVERY PHASE 2, PER MINUTE: Performed by: OTOLARYNGOLOGY

## 2022-12-16 PROCEDURE — 360N000077 HC SURGERY LEVEL 4, PER MIN: Performed by: OTOLARYNGOLOGY

## 2022-12-16 PROCEDURE — C1726 CATH, BAL DIL, NON-VASCULAR: HCPCS | Performed by: OTOLARYNGOLOGY

## 2022-12-16 PROCEDURE — 31630 BRONCHOSCOPY DILATE/FX REPR: CPT | Mod: 22 | Performed by: OTOLARYNGOLOGY

## 2022-12-16 PROCEDURE — 370N000017 HC ANESTHESIA TECHNICAL FEE, PER MIN: Performed by: OTOLARYNGOLOGY

## 2022-12-16 PROCEDURE — 258N000003 HC RX IP 258 OP 636: Performed by: NURSE ANESTHETIST, CERTIFIED REGISTERED

## 2022-12-16 RX ORDER — SODIUM CHLORIDE, SODIUM LACTATE, POTASSIUM CHLORIDE, CALCIUM CHLORIDE 600; 310; 30; 20 MG/100ML; MG/100ML; MG/100ML; MG/100ML
INJECTION, SOLUTION INTRAVENOUS CONTINUOUS
Status: DISCONTINUED | OUTPATIENT
Start: 2022-12-16 | End: 2022-12-16 | Stop reason: HOSPADM

## 2022-12-16 RX ORDER — FENTANYL CITRATE 50 UG/ML
25 INJECTION, SOLUTION INTRAMUSCULAR; INTRAVENOUS
Status: DISCONTINUED | OUTPATIENT
Start: 2022-12-16 | End: 2022-12-16 | Stop reason: HOSPADM

## 2022-12-16 RX ORDER — TRIAMCINOLONE ACETONIDE 40 MG/ML
INJECTION, SUSPENSION INTRA-ARTICULAR; INTRAMUSCULAR PRN
Status: DISCONTINUED | OUTPATIENT
Start: 2022-12-16 | End: 2022-12-16 | Stop reason: HOSPADM

## 2022-12-16 RX ORDER — FENTANYL CITRATE 50 UG/ML
50 INJECTION, SOLUTION INTRAMUSCULAR; INTRAVENOUS EVERY 5 MIN PRN
Status: DISCONTINUED | OUTPATIENT
Start: 2022-12-16 | End: 2022-12-16 | Stop reason: HOSPADM

## 2022-12-16 RX ORDER — LIDOCAINE HYDROCHLORIDE 20 MG/ML
INJECTION, SOLUTION INFILTRATION; PERINEURAL PRN
Status: DISCONTINUED | OUTPATIENT
Start: 2022-12-16 | End: 2022-12-16

## 2022-12-16 RX ORDER — SODIUM CHLORIDE, SODIUM LACTATE, POTASSIUM CHLORIDE, CALCIUM CHLORIDE 600; 310; 30; 20 MG/100ML; MG/100ML; MG/100ML; MG/100ML
INJECTION, SOLUTION INTRAVENOUS CONTINUOUS PRN
Status: DISCONTINUED | OUTPATIENT
Start: 2022-12-16 | End: 2022-12-16

## 2022-12-16 RX ORDER — FENTANYL CITRATE 50 UG/ML
INJECTION, SOLUTION INTRAMUSCULAR; INTRAVENOUS PRN
Status: DISCONTINUED | OUTPATIENT
Start: 2022-12-16 | End: 2022-12-16

## 2022-12-16 RX ORDER — LABETALOL HYDROCHLORIDE 5 MG/ML
10 INJECTION, SOLUTION INTRAVENOUS
Status: DISCONTINUED | OUTPATIENT
Start: 2022-12-16 | End: 2022-12-16 | Stop reason: HOSPADM

## 2022-12-16 RX ORDER — ONDANSETRON 4 MG/1
4 TABLET, ORALLY DISINTEGRATING ORAL EVERY 30 MIN PRN
Status: DISCONTINUED | OUTPATIENT
Start: 2022-12-16 | End: 2022-12-16 | Stop reason: HOSPADM

## 2022-12-16 RX ORDER — PROPOFOL 10 MG/ML
INJECTION, EMULSION INTRAVENOUS PRN
Status: DISCONTINUED | OUTPATIENT
Start: 2022-12-16 | End: 2022-12-16

## 2022-12-16 RX ORDER — KETOROLAC TROMETHAMINE 30 MG/ML
15 INJECTION, SOLUTION INTRAMUSCULAR; INTRAVENOUS ONCE
Status: COMPLETED | OUTPATIENT
Start: 2022-12-16 | End: 2022-12-16

## 2022-12-16 RX ORDER — HYDRALAZINE HYDROCHLORIDE 20 MG/ML
2.5-5 INJECTION INTRAMUSCULAR; INTRAVENOUS EVERY 10 MIN PRN
Status: DISCONTINUED | OUTPATIENT
Start: 2022-12-16 | End: 2022-12-16 | Stop reason: HOSPADM

## 2022-12-16 RX ORDER — CEFAZOLIN SODIUM/WATER 2 G/20 ML
2 SYRINGE (ML) INTRAVENOUS SEE ADMIN INSTRUCTIONS
Status: DISCONTINUED | OUTPATIENT
Start: 2022-12-16 | End: 2022-12-16 | Stop reason: HOSPADM

## 2022-12-16 RX ORDER — OXYMETAZOLINE HYDROCHLORIDE 0.05 G/100ML
SPRAY NASAL PRN
Status: DISCONTINUED | OUTPATIENT
Start: 2022-12-16 | End: 2022-12-16 | Stop reason: HOSPADM

## 2022-12-16 RX ORDER — FENTANYL CITRATE 50 UG/ML
25 INJECTION, SOLUTION INTRAMUSCULAR; INTRAVENOUS EVERY 5 MIN PRN
Status: DISCONTINUED | OUTPATIENT
Start: 2022-12-16 | End: 2022-12-16 | Stop reason: HOSPADM

## 2022-12-16 RX ORDER — DEXAMETHASONE SODIUM PHOSPHATE 4 MG/ML
INJECTION, SOLUTION INTRA-ARTICULAR; INTRALESIONAL; INTRAMUSCULAR; INTRAVENOUS; SOFT TISSUE PRN
Status: DISCONTINUED | OUTPATIENT
Start: 2022-12-16 | End: 2022-12-16

## 2022-12-16 RX ORDER — ONDANSETRON 2 MG/ML
INJECTION INTRAMUSCULAR; INTRAVENOUS PRN
Status: DISCONTINUED | OUTPATIENT
Start: 2022-12-16 | End: 2022-12-16

## 2022-12-16 RX ORDER — ONDANSETRON 2 MG/ML
4 INJECTION INTRAMUSCULAR; INTRAVENOUS EVERY 30 MIN PRN
Status: DISCONTINUED | OUTPATIENT
Start: 2022-12-16 | End: 2022-12-16 | Stop reason: HOSPADM

## 2022-12-16 RX ORDER — HYDROMORPHONE HYDROCHLORIDE 1 MG/ML
0.4 INJECTION, SOLUTION INTRAMUSCULAR; INTRAVENOUS; SUBCUTANEOUS EVERY 5 MIN PRN
Status: DISCONTINUED | OUTPATIENT
Start: 2022-12-16 | End: 2022-12-16 | Stop reason: HOSPADM

## 2022-12-16 RX ORDER — PROPOFOL 10 MG/ML
INJECTION, EMULSION INTRAVENOUS CONTINUOUS PRN
Status: DISCONTINUED | OUTPATIENT
Start: 2022-12-16 | End: 2022-12-16

## 2022-12-16 RX ORDER — MEPERIDINE HYDROCHLORIDE 25 MG/ML
12.5 INJECTION INTRAMUSCULAR; INTRAVENOUS; SUBCUTANEOUS
Status: DISCONTINUED | OUTPATIENT
Start: 2022-12-16 | End: 2022-12-16 | Stop reason: HOSPADM

## 2022-12-16 RX ORDER — LIDOCAINE 40 MG/G
CREAM TOPICAL
Status: DISCONTINUED | OUTPATIENT
Start: 2022-12-16 | End: 2022-12-16 | Stop reason: HOSPADM

## 2022-12-16 RX ORDER — LIDOCAINE HYDROCHLORIDE 40 MG/ML
SOLUTION TOPICAL PRN
Status: DISCONTINUED | OUTPATIENT
Start: 2022-12-16 | End: 2022-12-16 | Stop reason: HOSPADM

## 2022-12-16 RX ORDER — CEFAZOLIN SODIUM/WATER 2 G/20 ML
2 SYRINGE (ML) INTRAVENOUS
Status: COMPLETED | OUTPATIENT
Start: 2022-12-16 | End: 2022-12-16

## 2022-12-16 RX ADMIN — Medication 30 MG: at 16:30

## 2022-12-16 RX ADMIN — DEXAMETHASONE SODIUM PHOSPHATE 10 MG: 4 INJECTION, SOLUTION INTRA-ARTICULAR; INTRALESIONAL; INTRAMUSCULAR; INTRAVENOUS; SOFT TISSUE at 15:57

## 2022-12-16 RX ADMIN — Medication 2 G: at 16:01

## 2022-12-16 RX ADMIN — MIDAZOLAM 2 MG: 1 INJECTION INTRAMUSCULAR; INTRAVENOUS at 15:50

## 2022-12-16 RX ADMIN — SODIUM CHLORIDE, POTASSIUM CHLORIDE, SODIUM LACTATE AND CALCIUM CHLORIDE: 600; 310; 30; 20 INJECTION, SOLUTION INTRAVENOUS at 15:51

## 2022-12-16 RX ADMIN — ONDANSETRON 4 MG: 2 INJECTION INTRAMUSCULAR; INTRAVENOUS at 15:57

## 2022-12-16 RX ADMIN — SUGAMMADEX 200 MG: 100 INJECTION, SOLUTION INTRAVENOUS at 16:51

## 2022-12-16 RX ADMIN — PROPOFOL 150 MG: 10 INJECTION, EMULSION INTRAVENOUS at 16:04

## 2022-12-16 RX ADMIN — FENTANYL CITRATE 100 MCG: 50 INJECTION, SOLUTION INTRAMUSCULAR; INTRAVENOUS at 15:51

## 2022-12-16 RX ADMIN — LIDOCAINE HYDROCHLORIDE 50 MG: 20 INJECTION, SOLUTION INFILTRATION; PERINEURAL at 16:04

## 2022-12-16 RX ADMIN — PROPOFOL 150 MCG/KG/MIN: 10 INJECTION, EMULSION INTRAVENOUS at 15:57

## 2022-12-16 ASSESSMENT — ACTIVITIES OF DAILY LIVING (ADL)
ADLS_ACUITY_SCORE: 35
ADLS_ACUITY_SCORE: 35

## 2022-12-16 ASSESSMENT — LIFESTYLE VARIABLES: TOBACCO_USE: 0

## 2022-12-16 NOTE — BRIEF OP NOTE
[facility]    Brief Operative Note    Pre-operative diagnosis: Subglottic stenosis [J38.6]  Post-operative diagnosis Same as pre-operative diagnosis    Procedure: Procedure(s):  MICROLARYNGOSCOPY, carbon dioxide laser resection of stenosis, CRE balloon dilation  BRONCHOSCOPY, FIBEROPTIC  steroid injection  Surgeon: Surgeon(s) and Role:     * Nadine Hoffmann MD - Primary     * Ani Boogie MD - Resident - Assisting  Anesthesia: General   Estimated Blood Loss: Minimal    Drains: None  Specimens: * No specimens in log *  Findings:   See op note.  Complications: None.  Implants: * No implants in log *

## 2022-12-16 NOTE — ANESTHESIA CARE TRANSFER NOTE
Patient: Alise Orona    Procedure: Procedure(s):  MICROLARYNGOSCOPY, carbon dioxide laser resection of stenosis, CRE balloon dilation  BRONCHOSCOPY, FIBEROPTIC  steroid injection       Diagnosis: Subglottic stenosis [J38.6]  Diagnosis Additional Information: No value filed.    Anesthesia Type:   General     Note:    Oropharynx: oropharynx clear of all foreign objects and spontaneously breathing  Level of Consciousness: awake and drowsy  Oxygen Supplementation: face mask  Level of Supplemental Oxygen (L/min / FiO2): 6  Independent Airway: airway patency satisfactory and stable  Dentition: dentition unchanged  Vital Signs Stable: post-procedure vital signs reviewed and stable  Report to RN Given: handoff report given  Patient transferred to: PACU    Handoff Report: Identifed the Patient, Identified the Reponsible Provider, Reviewed the pertinent medical history, Discussed the surgical course, Reviewed Intra-OP anesthesia mangement and issues during anesthesia, Set expectations for post-procedure period and Allowed opportunity for questions and acknowledgement of understanding      Vitals:  Vitals Value Taken Time   /62 12/16/22 1711   Temp 36.5  C (97.7  F) 12/16/22 1710   Pulse 75 12/16/22 1722   Resp 5 12/16/22 1722   SpO2 100 % 12/16/22 1722   Vitals shown include unvalidated device data.    Electronically Signed By: ITA Liz CRNA  December 16, 2022  5:23 PM

## 2022-12-16 NOTE — ANESTHESIA POSTPROCEDURE EVALUATION
Patient: Alise Orona    Procedure: Procedure(s):  MICROLARYNGOSCOPY, carbon dioxide laser resection of stenosis, CRE balloon dilation  BRONCHOSCOPY, FIBEROPTIC  steroid injection       Anesthesia Type:  General    Note:  Disposition: Outpatient   Postop Pain Control: Uneventful            Sign Out: Well controlled pain   PONV: No   Neuro/Psych: Uneventful            Sign Out: Acceptable/Baseline neuro status   Airway/Respiratory: Uneventful            Sign Out: Acceptable/Baseline resp. status   CV/Hemodynamics: Uneventful            Sign Out: Acceptable CV status; No obvious hypovolemia; No obvious fluid overload   Other NRE: NONE   DID A NON-ROUTINE EVENT OCCUR? No           Last vitals:  Vitals Value Taken Time   /53 12/16/22 1730   Temp 36.5  C (97.7  F) 12/16/22 1710   Pulse 73 12/16/22 1737   Resp 7 12/16/22 1737   SpO2 98 % 12/16/22 1737   Vitals shown include unvalidated device data.    Electronically Signed By: Kota Lazo MD  December 16, 2022  5:39 PM

## 2022-12-16 NOTE — ANESTHESIA POSTPROCEDURE EVALUATION
Patient: Alise Orona    Procedure: Procedure(s):  MICROLARYNGOSCOPY, carbon dioxide laser resection of stenosis, CRE balloon dilation  BRONCHOSCOPY, FIBEROPTIC  steroid injection       Anesthesia Type:  General    Note:  Disposition: Outpatient   Postop Pain Control: Uneventful            Sign Out: Well controlled pain   PONV: No   Neuro/Psych: Uneventful            Sign Out: Acceptable/Baseline neuro status   Airway/Respiratory: Uneventful            Sign Out: Acceptable/Baseline resp. status   CV/Hemodynamics: Uneventful            Sign Out: Acceptable CV status; No obvious hypovolemia; No obvious fluid overload   Other NRE: NONE   DID A NON-ROUTINE EVENT OCCUR? No           Last vitals:  Vitals Value Taken Time   /53 12/16/22 1730   Temp 36.5  C (97.7  F) 12/16/22 1710   Pulse 73 12/16/22 1735   Resp 14 12/16/22 1735   SpO2 97 % 12/16/22 1735   Vitals shown include unvalidated device data.    Electronically Signed By: Kota Lazo MD  December 16, 2022  5:37 PM

## 2022-12-16 NOTE — ANESTHESIA PREPROCEDURE EVALUATION
Anesthesia Pre-Procedure Evaluation    Patient: Alise Orona   MRN: [id number] : 1990        Procedure : Procedure(s):  MICROLARYNGOSCOPY, carbon dioxide laser resection of stenosis, CRE balloon dilation  BRONCHOSCOPY, FIBEROPTIC  steroid injection, possible mitomycin application          Past Medical History:   Diagnosis Date     Attention deficit hyperactivity disorder (ADHD), predominantly inattentive type 2015     Binge-eating disorder, mild 2015     Family history of thyroid cancer 2014     Generalized anxiety disorder 11/15/2013     Major depressive disorder, recurrent episode, in full remission (H) 2014     Menorrhagia 10/2/2012     Obesity 2014     Ovarian cyst 10/2/2012     Screening for malignant neoplasm of cervix 2014-2012 NILM 2014 NILM, hpv negative  NILM     27 y.o. Plan:   Cotesting 2020    BV  ; Pap test history     Subglottic stenosis 2019    Added automatically from request for surgery [id number]      Past Surgical History:   Procedure Laterality Date     GYN SURGERY      lap removal of ovarian cyst      INJECT STEROID (LOCATION) N/A 2019    Procedure: INJECTION, STEROID;  Surgeon: Nadine Ayers MD;  Location: [facility] OR     INJECT STEROID (LOCATION) N/A 2021    Procedure: steroid injection;  Surgeon: Nadine Hoffmann MD;  Location: [facility] OR     LARYNGOSCOPY, FLEXIBLE WITH INJECTION N/A 2022    Procedure: LARYNGOSCOPY, FLEXIBLE WITH INJECTION;  Surgeon: Nadine Hoffmann MD;  Location: [facility] OR     LARYNGOSCOPY, FLEXIBLE WITH INJECTION N/A 3/18/2022    Procedure: LARYNGOSCOPY, FLEXIBLE WITH INJECTION;  Surgeon: Nadine Hoffmann MD;  Location: [facility] OR     LARYNGOSCOPY, FLEXIBLE WITH INJECTION N/A 4/15/2022    Procedure: LARYNGOSCOPY, FLEXIBLE;  Surgeon: Nadine Hoffmann MD;  Location: [facility] OR     LARYNGOSCOPY, FLEXIBLE WITH INJECTION N/A 2022    Procedure: LARYNGOSCOPY, FLEXIBLE WITH INJECTION;  Surgeon: Nadine Hoffmann MD;  Location:  [facility] OR     LARYNGOSCOPY, FLEXIBLE WITH INJECTION N/A 6/17/2022    Procedure: LARYNGOSCOPY, FLEXIBLE WITH INJECTION;  Surgeon: Nadine Hoffmann MD;  Location: [facility] OR     LARYNGOSCOPY, FLEXIBLE WITH INJECTION N/A 7/15/2022    Procedure: LARYNGOSCOPY, FLEXIBLE WITH INJECTION;  Surgeon: Nadine Hoffmann MD;  Location: [facility] OR     LASER CO2 LARYNGOSCOPY N/A 12/22/2021    Procedure: flexible bronchoscopy, carbon dioxide laser resection of stenosis, CRE balloon dilation;  Surgeon: Nadine Hoffmann MD;  Location: [facility] OR     LASER CO2 LARYNGOSCOPY, COMPLEX N/A 11/5/2019    Procedure: microdirect laryngoscopy, flexible bronchoscopy, flexible CO2 laser laryngoscopy with excision of stenosis, laryngoscopy with dilation, flexible esophagoscopy;  Surgeon: Nadine Ayers MD;  Location: [facility] OR     WISDOM TEETH EXTRACTION        No Known Allergies   Social History     Tobacco Use     Smoking status: Never     Smokeless tobacco: Never   Substance Use Topics     Alcohol use: Not Currently      Wt Readings from Last 1 Encounters:   12/16/22 117.8 kg (259 lb 11.2 oz)        Anesthesia Evaluation   Pt has had prior anesthetic. Type: General.    No history of anesthetic complications       ROS/MED HX  ENT/Pulmonary: Comment:  Subglottic stenosis s/p dilation 2019. Possibly related to intubation in 2012 for OBGYN procedure.     RNC, grade 3, 80% narrowing subglottically.    Mild dysphonia. No vocal cord abnormalities.   (-) tobacco use   Neurologic:       Cardiovascular:  - neg cardiovascular ROS     METS/Exercise Tolerance:     Hematologic: Comments: No lab results found.   Lab Test        12/16/22 12/22/21                       1416          0624          GLC          98           73                  Musculoskeletal:  - neg musculoskeletal ROS     GI/Hepatic:  - neg GI/hepatic ROS     Renal/Genitourinary:  - neg Renal ROS     Endo:     (+) Obesity,     Psychiatric/Substance Use:     (+) psychiatric history other (comment)     Infectious  Disease:  - neg infectious disease ROS     Malignancy:  - neg malignancy ROS     Other:     (-) Any chance pregnant       Physical Exam    Airway        Mallampati: II   TM distance: > 3 FB   Neck ROM: full   Mouth opening: > 3 cm    Respiratory Devices and Support         Dental  no notable dental history         Cardiovascular   cardiovascular exam normal          Pulmonary   pulmonary exam normal                OUTSIDE LABS:  CBC: No results found for: WBC, HGB, HCT, PLT  BMP:   Lab Results   Component Value Date    GLC 98 12/16/2022    GLC 73 12/22/2021     COAGS: No results found for: PTT, INR, FIBR  POC:   Lab Results   Component Value Date     (H) 11/05/2019    HCG Negative 11/05/2019     HEPATIC: No results found for: ALBUMIN, PROTTOTAL, ALT, AST, GGT, ALKPHOS, BILITOTAL, BILIDIRECT, PRISCILLA  OTHER: No results found for: PH, LACT, A1C, MONTEZ, PHOS, MAG, LIPASE, AMYLASE, TSH, T4, T3, CRP, SED    Anesthesia Plan    ASA Status:  2      Anesthesia Type: General.     - Airway: ETT   Induction: Intravenous, Propofol.   Maintenance: Balanced.        Consents    Anesthesia Plan(s) and associated risks, benefits, and realistic alternatives discussed. Questions answered and patient/representative(s) expressed understanding.    - Discussed:     - Discussed with:  Patient      - Extended Intubation/Ventilatory Support Discussed: No.      - Patient is DNR/DNI Status: No    Use of blood products discussed: Yes.     - Discussed with: Patient.     - Consented: consented to blood products            Reason for refusal: other.     Postoperative Care    Pain management: IV analgesics.   PONV prophylaxis: Ondansetron (or other 5HT-3), Dexamethasone or Solumedrol     Comments:                Kota Lazo MD

## 2022-12-16 NOTE — ANESTHESIA PROCEDURE NOTES
Airway       Patient location during procedure: OR       Procedure Start/Stop Times: 12/16/2022 4:00 PM  Staff -        CRNA: Naresh Ochoa APRN CRNA       Performed By: CRNAIndications and Patient Condition       Indications for airway management: charmaine-procedural       Induction type:intravenous       Mask difficulty assessment: 0 - not attempted    Final Airway Details       Final airway type: supraglottic airway    Supraglottic Airway Details        Type: LMA       Brand: LMA Unique       LMA size: 4    Post intubation assessment        Placement verified by: capnometry, equal breath sounds and chest rise        Number of attempts at approach: 1       Secured with: silk tape       Ease of procedure: difficult       Dentition: Unchanged and Intact    Medication(s) Administered   Medication Administration Time: 12/16/2022 4:00 PM    Additional Comments       LMA went in easy but pt does have noted subglottic stenosis

## 2022-12-16 NOTE — OP NOTE
Operative Note   Otolaryngology - Head and Neck Surgery       DATE OF OPERATION:   December 16, 2022    PREOPERATIVE DIAGNOSIS:   Subglottic laryngeal stenosis.      POSTOPERATIVE DIAGNOSIS:   Same    NAME OF OPERATION:   1. Flexible bronchoscopy with CO2 laser of subglottic stenosis.   2. Flexible bronchoscopy with balloon dilation of subglottic stenosis to 13.5 mmHg   3. Microlaryngoscopy with injection of kenalog steroid to subglottic larynx.  4. Flexible bronchoscopy with therapeutic suction    ANESTHESIA  Type: General   LMA   Then dedo with 6.0 and then 5.0 ett through    SURGEON:   Nadine Hoffmann MD    ASSISTANT SURGEON(S):   Ani Boogie MD    INDICATIONS FOR PROCEDURE:    The patient is a 31 year old female with a history of subglottic stenosis. She is being brought to the Operating Room for subsequent treatment of the stenosis. She underwent flex bronch with CRE balloon dilation to 12mmHg on 11/5/19, 12/22/21, 2/2/22. Now she is doing an awakes steroid injection series, first one on 3/18/22, second one on 4/15/22,third one on 5/27/22. She had a third one on 6/17/22 and then 7/15/22. She returns today for repeat surgery since she has upcoming bariatric surgery next week, her peak flow is still 340. Risks, benefits, alternatives, and rationale for the procedure(s) listed above were discussed with the patient, and the patient wishes to proceed with surgery and has signed an informed consent.     COMPLEXITY  This surgery was more complex than normally because of performing the surgery with obesity comorbidity. The approach therefore was more difficult technically than normally.  Significantly more time was required to perform all parts of the operation, especially keeping appropriate oxygenation saturation, required multiple start and stops to allow for appropriate oxygenation.       FINDINGS:   1. First part of the procedure done via LMA with spontaneous ventilation, second part was done with exposure with  dedo and apneic ventilation with 6 ett  2. CRE balloon to 13.5 mmHg  3. Grade 1, 50% narrowing, distal scar anterior and posterior that were lasered with the flexible laser  4. Patient can be easily intubated with a 6.0 ett    Post-op: plan to continue home flovent and nasal flonase, steroid injection in the office in January - will put orders in for now but patient to confirm she is recovered well from bariatric surgery     DESCRIPTION OF PROCEDURE:   The patient was brought into the operating room and placed supine on the operating room table. A time-out was performed. General anesthesia was induced. The patient was masked with an oral airway. Then the patient was ventilated with a LMA.     The head of the bed was turned 90 degrees to the right. A flexible bronchoscope was placed through the LMA. 4% LTA was infused over the glottic entry. Once the topical anesthesia had been placed, the flexible bronchoscope was placed through the vocal cords. The patient had evidence of approximately grade 1, 40% subglottic and tracheal narrowing. There was a scar bridge was distal     The procedure began with first performing CO2 laser. The flexible waveguide CO2 laser was threaded through the bronchoscope. A laser time-out was performed. The patient's face and eyes were protected with moist towels. The oxygen was subsequently reduced to less than 30%. Radial cuts were made within the scar band starting anteriorly through the scar bridge and then more cuts were made laterally and posteriorly.     The patient was then allowed to obtain adequate saturation and the balloon dilator was then placed through the flexible bronchoscope. Dilations were performed to 13.5 mmHg. Once the subglottic area had been adequately dilated, the patient was once again allowed to obtain 100% saturation.     Then the  LMA was removed and the dentition and mucosa were inspected prior to start. A reinforced tooth guard was placed on the upper dentition.  The dedo laryngoscope was carefully introduced into the oral cavity and gently passed to the oropharynx. Here the glottis was exposed and the patient was placed in suspension.      A 6.0 ett passed through easily.    Then the CRE balloon dilator was brought into the field and dilations were performed to 13.5 mmHg.     Then 1.0 ml of kenalog 40 steroid  was injected into the scar.      The subglottis was now fully open. This was the end of our procedure. Afrin soaked pledgets were applied to the surgical site for hemostasis.  The surgical site was then inspected and no residual bleeding was seen. The patient was taken out of suspension. The instrumentation was carefully removed, examining the mucosa and dentition on the way out. The dental guard was removed, and the mucosa and dentition were seen to be in their preoperative state at the conclusion of the procedure. The patient was then handed back to the care of anesthesia who awoke the patient without complication on a mask. The patient was then transferred to the PACU.     COMPLICATIONS:   None.    ESTIMATED BLOOD LOSS:   Less than 10cc    DISPOSITION:   PACU.    SPECIMENS:  * No specimens in log *       PHOTODOCUMENTATION:

## 2022-12-17 NOTE — DISCHARGE INSTRUCTIONS
[facility]  Same-Day Surgery   Adult Discharge Orders & Instructions     For 24 hours after surgery    Get plenty of rest.  A responsible adult must stay with you for at least 24 hours after you leave the hospital.   Do not drive or use heavy equipment.  If you have weakness or tingling, don't drive or use heavy equipment until this feeling goes away.  Do not drink alcohol.  Avoid strenuous or risky activities.  Ask for help when climbing stairs.   You may feel lightheaded.  IF so, sit for a few minutes before standing.  Have someone help you get up.   If you have nausea (feel sick to your stomach): Drink only clear liquids such as apple juice, ginger ale, broth or 7-Up.  Rest may also help.  Be sure to drink enough fluids.  Move to a regular diet as you feel able.  You may have a slight fever. Call the doctor if your fever is over 100 F (37.7 C) (taken under the tongue) or lasts longer than 24 hours.  You may have a dry mouth, a sore throat, muscle aches or trouble sleeping.  These should go away after 24 hours.  Do not make important or legal decisions.   Call your doctor for any of the followin.  Signs of infection (fever, growing tenderness at the surgery site, a large amount of drainage or bleeding, severe pain, foul-smelling drainage, redness, swelling).    2. It has been over 8 to 10 hours since surgery and you are still not able to urinate (pass water).    3.  Headache for over 24 hours.    4.  Numbness, tingling or weakness the day after surgery (if you had spinal anesthesia).  To contact a doctor, call Dr. Hoffmann at Otolaryngology/ENT clinic @ [phone] (Monday-Friday 8-4:30)  or:  [phone] and ask for the resident on call for Otolaryngology  (answered 24 hours a day)  '   Emergency Department:    [facility]: [phone]       (TTY for hearing impaired: [phone])    [facility]: [phone]       (TTY for hearing impaired:  [phone])

## 2023-06-03 ENCOUNTER — HEALTH MAINTENANCE LETTER (OUTPATIENT)
Age: 33
End: 2023-06-03

## 2024-07-07 ENCOUNTER — HEALTH MAINTENANCE LETTER (OUTPATIENT)
Age: 34
End: 2024-07-07

## 2024-08-12 NOTE — PROGRESS NOTES
[facility]   at the [facility]   Otolaryngology Clinic     Patient: Alise Orona    MRN: [id number]    : 1990    Age/Gender: 34 year old female  Date of Service: 8/15/2024  Rendering Provider:   Nadine Hoffmann MD         Impression & Plan     IMPRESSION: Ms. Orona is a 34 year old female who is being seen for the followin.    Dyspnea  - due to subglottic stenosis   - intubation history possible in  due to an ovarian cyst - not sure  - diabetes - HgA1c 21 - 5.7   - autoimmune labs - from 10/21/19 - TERI, RF, ANCA - negative  - biopsy none  - imaging none  - patient symptoms with dyspnea  - scope showed grade 3 subglottic stenosis on 19  - discussed etiology of trauma (intubation), autoimmune, diabetes or idiopathic. In this case this is most likely DM and prior intubation induced  - discussed treatment with observation, conservative management with oral abx/steroids/reflux precautions, steroid injections, endoscopic procedures, open resection and bypass procedure with tracheotomy  - patient elects to proceed with endoscopic surgery  - s/p  MDL with CO2 laser resection, dilation and steroid injection on 19  - symptoms have come back for the past 6 months (reported today 21)  - scope today shows grade 3, 80% narrowing subglottically  - peak flow meter today is 200  - discussed she needs another dilation after which we will discuss long term management again to establish goals and a follow up plan  - discussed given her severity of stenosis as well as her weight, her surgery is high risk and she is at risk of a trach   - s/p flex bronch with CO2 laser and dilation to 12 mmHg 2021  - symptoms today 2022 are improved with better breathing, peak flow meter is 375  - scope today 2022 shows subglottic stenosis improved, grade 1, 20% narrowing, some mild white granulation anteriorly, right vocal fold slight paresis, minimal  restriction on the right   - discussed weight management and diabetic control   - discussed steroid injection, risks, benefits and alternatives were discussed  - symptoms 12/14/2022 are good with stable peak flow of 375  - she is undergoing vertical gastric sleeve on 12/19/22  - scope shows grade 2 60% narrowing  - discussed that there is some narrowing and concern for safely placing endotracheal tube through. Would recommend using an LMA if possible and if not there is the risk of not being able to intubate and having to abort the procedure.   - As such would recommend predilation prior to surgery. Since her surgery is on Monday she would need this tomorrow.   - discussed that with her current stenosis a size 5 intubation may not work on Monday and therefore we could pre-dilate her tomorrow prior to surgery  - called and spoke to her anesthesia team and they informed me they are unable to use LMA and would prefer size 6.0 trach. Therefore we will proceed with predilation to prepare for passage of size 6.0 tube ( [facility] (# is [phone]))  - s/p flex bronch with CO2 laser and dilation to 13.5mmHg 12/16/22  - symptoms 8/15/2024 are worsened, feels more difficulty with stairs now, though her peak flow meter is 375, had bariatric surgery, now has intermittent reflux treated with omeprazole PRN  - scope shows grade 2, 60% stenosis, persistent right vocal fold paresis   - discussed observation vs surgery - would like to proceed with surgery  - given stenosis is not severe, question paradoxical vocal fold motion  component - will address if she is symptomatic after surgery  - also has significant mucus - question reflux component - if still present after surgery and nebulizer treatment will refer to GI   Plan  - nebulizer  - needs nebulizer - this is medically necessary to prevent mucus plugging and difficulty breathing  - mucinex  - omeprazole PRN  - schedule surgery  - if still breathing issues post surgery -  will see with SLP for paradoxical vocal fold motion         2. 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 BRONCHOSCOPY, FIBEROPTIC steroid injection 12/12/22  Interval History   HISTORY OF PRESENT ILLNESS: Ms. Orona is a 34 year old female is being followed for subglottic stenosis. s/p MDL with CO2 laser resection and dilation with steroid injection on 11/15/19. She was initially seen on 11/01/2019 before her surgery. Please refer to 11/14/2021 note for full history.   Of note, she has acid reflux and a hx of a gastric bypass.    Today, she presents for follow up. she reports:  - She notices symptoms more such as heavy breathing with increased activity such as stairs  - She states that her output is 375 and that it is typically low  - She reports frequent throat clearing and thick mucous production  - She does not have a nebulizer or use an inhaler  - She takes reflux medication as she states she has gotten reflux since the surgery. She is taking this medication, Omeprazole, pretty much every day.  - She is regularly following up with her GI surgeon who  suggested taking Omeprazole as needed. She was told by this provider that a revision bypass is an option if her reflux gets severe.     PAST MEDICAL HISTORY:   Past Medical History:   Diagnosis Date    Attention deficit hyperactivity disorder (ADHD), predominantly inattentive type 6/8/2015    Binge-eating disorder, mild 6/8/2015    Family history of thyroid cancer 12/18/2014    Generalized anxiety disorder 11/15/2013    Major depressive disorder, recurrent episode, in full remission (H) 4/16/2014    Menorrhagia 10/2/2012    Obesity 12/8/2014    Ovarian cyst 10/2/2012    Screening for malignant neoplasm of cervix 12/22/2014 2010-2012 NILM 2014 NILM, hpv negative 2017 NILM     27 y.o. Plan:   Cotesting 5/2020    BV  ; Pap test history    Subglottic stenosis 11/1/2019    Added automatically from request for surgery [id number]       PAST SURGICAL HISTORY:   Past Surgical History:   Procedure Laterality Date    BRONCHOSCOPY FLEXIBLE N/A 12/16/2022    Procedure: BRONCHOSCOPY, FIBEROPTIC;  Surgeon: Nadine Hoffmann MD;  Location: [facility] OR    GYN SURGERY      lap removal of ovarian cyst     INJECT STEROID (LOCATION) N/A 11/5/2019    Procedure: INJECTION, STEROID;  Surgeon: Nadine Ayers MD;  Location: [facility] OR    INJECT STEROID (LOCATION) N/A 12/22/2021    Procedure: steroid injection;  Surgeon: Nadine Hoffmann MD;  Location: [facility]    INJECT STEROID (LOCATION) N/A 12/16/2022    Procedure: steroid injection;  Surgeon: Nadine Hoffmann MD;  Location: [facility] OR    LARYNGOSCOPY, FLEXIBLE WITH INJECTION N/A 2/2/2022    Procedure: LARYNGOSCOPY, FLEXIBLE WITH INJECTION;  Surgeon: Nadine Hoffmann MD;  Location: [facility] OR    LARYNGOSCOPY, FLEXIBLE WITH INJECTION N/A 3/18/2022    Procedure: LARYNGOSCOPY, FLEXIBLE WITH INJECTION;  Surgeon: Nadine Hoffmann MD;  Location: [facility] OR    LARYNGOSCOPY, FLEXIBLE WITH INJECTION N/A 4/15/2022    Procedure: LARYNGOSCOPY, FLEXIBLE;  Surgeon: Nadine Hoffmann MD;  Location: [facility] OR    LARYNGOSCOPY, FLEXIBLE WITH INJECTION  N/A 5/27/2022    Procedure: LARYNGOSCOPY, FLEXIBLE WITH INJECTION;  Surgeon: Nadine Hoffmann MD;  Location: [facility] OR    LARYNGOSCOPY, FLEXIBLE WITH INJECTION N/A 6/17/2022    Procedure: LARYNGOSCOPY, FLEXIBLE WITH INJECTION;  Surgeon: Nadine Hoffmann MD;  Location: [facility] OR    LARYNGOSCOPY, FLEXIBLE WITH INJECTION N/A 7/15/2022    Procedure: LARYNGOSCOPY, FLEXIBLE WITH INJECTION;  Surgeon: Nadine Hoffmann MD;  Location: [facility] OR    LASER CO2 LARYNGOSCOPY N/A 12/22/2021    Procedure: flexible bronchoscopy, carbon dioxide laser resection of stenosis, CRE balloon dilation;  Surgeon: Nadine Hoffmann MD;  Location: [facility]    LASER CO2 LARYNGOSCOPY N/A 12/16/2022    Procedure: MICROLARYNGOSCOPY, carbon dioxide laser resection of stenosis, CRE balloon dilation;  Surgeon: Nadine Hoffmann MD;  Location: [facility]    LASER CO2 LARYNGOSCOPY, COMPLEX N/A 11/5/2019    Procedure: microdirect laryngoscopy, flexible bronchoscopy, flexible CO2 laser laryngoscopy with excision of stenosis, laryngoscopy with dilation, flexible esophagoscopy;  Surgeon: Nadine Ayers MD;  Location: [facility] OR    WISDOM TEETH EXTRACTION         CURRENT MEDICATIONS:   Current Outpatient Medications:     buPROPion (WELLBUTRIN XL) 300 MG 24 hr tablet, Take 300 mg by mouth every morning , Disp: , Rfl:     busPIRone (BUSPAR) 5 MG tablet, Take one (1) tablet by mouth every morning AND two (2) tablets at bedtime, Disp: , Rfl:     cholecalciferol 50 MCG (2000 UT) tablet, Take 2,000 Units by mouth, Disp: , Rfl:     clonazePAM (KLONOPIN) 0.5 MG tablet, Take one half (0.5) tablets by mouth twice a day AND two (2) tablets at bedtime, Disp: , Rfl:     clonazePAM (KLONOPIN) 1 MG tablet, Take 1 mg by mouth nightly as needed, Disp: , Rfl:     Escitalopram Oxalate (LEXAPRO PO), Take 20 mg by mouth daily, Disp: , Rfl:     fluticasone (FLONASE) 50 MCG/ACT nasal spray, Spray 2 sprays into both nostrils daily, Disp: 9.9 mL, Rfl: 3    fluticasone (FLOVENT HFA) 220 MCG/ACT inhaler, Inhale 2 puffs  into the lungs 2 times daily, Disp: 12 g, Rfl: 3    norgestimate-ethinyl estradiol (ORTHO-CYCLEN, SPRINTEC) 0.25-35 MG-MCG tablet, Take 1 tablet by mouth daily, Disp: , Rfl:     pimecrolimus (ELIDEL) 1 % external cream, Apply topically 2 times daily, Disp: , Rfl:     VYVANSE 10 MG capsule, Take one (1) capsule by mouth every morning, Disp: , Rfl:     acetaminophen (TYLENOL) 325 MG tablet, Take 2 tablets (650 mg) by mouth every 4 hours as needed for mild pain, Disp: 50 tablet, Rfl: 0    acetaminophen (TYLENOL) 325 MG tablet, Take 2 tablets (650 mg) by mouth every 4 hours as needed for mild pain, Disp: 50 tablet, Rfl: 0    busPIRone (BUSPAR) 10 MG tablet, Take by mouth 2 times daily , Disp: , Rfl:     omeprazole (PRILOSEC) 40 MG DR capsule, Take 1 capsule (40 mg) by mouth daily before breakfast, Disp: 90 capsule, Rfl: 3    ondansetron (ZOFRAN-ODT) 4 MG ODT tab, Take 1-2 tablets (4-8 mg) by mouth every 8 hours as needed for nausea, Disp: 4 tablet, Rfl: 0    oxyCODONE (ROXICODONE) 5 MG tablet, Take 1 tablet (5 mg) by mouth every 6 hours as needed for severe pain, Disp: 6 tablet, Rfl: 0    sulfamethoxazole-trimethoprim (BACTRIM DS) 800-160 MG tablet, Take 1 tablet by mouth 2 times daily, Disp: 14 tablet, Rfl: 0    sulfamethoxazole-trimethoprim (BACTRIM DS) 800-160 MG tablet, Take 1 tablet by mouth 2 times daily, Disp: 20 tablet, Rfl: 0    ALLERGIES: Patient has no known allergies.    SOCIAL HISTORY:    Social History     Socioeconomic History    Marital status: Single     Spouse name: Not on file    Number of children: Not on file    Years of education: Not on file    Highest education level: Not on file   Occupational History    Not on file   Tobacco Use    Smoking status: Never    Smokeless tobacco: Never   Substance and Sexual Activity    Alcohol use: Not Currently    Drug use: Never    Sexual activity: Not on file   Other Topics Concern    Parent/sibling w/ CABG, MI or angioplasty before 65F 55M? Not Asked   Social  History Narrative    Not on file     Social Determinants of Health     Financial Resource Strain: Not on file   Food Insecurity: Not on file   Transportation Needs: Not on file   Physical Activity: Not on file   Stress: Not on file   Social Connections: Not on file   Interpersonal Safety: Not on file   Housing Stability: Not on file         FAMILY HISTORY:   Family History   Problem Relation Age of Onset    Depression Mother     Substance Abuse Father     Depression Maternal Grandmother     Substance Abuse Sister     Depression Other     Bipolar Disorder Other     Anxiety Disorder Other     Schizophrenia Other     Substance Abuse Other       Non-contributory for problems with anesthesia    REVIEW OF SYSTEMS:   The patient was asked a 14 point review of systems regarding constitutional symptoms, eye symptoms, ears, nose, mouth, throat symptoms, cardiovascular symptoms, respiratory symptoms, gastrointestinal symptoms, genitourinary symptoms, musculoskeletal symptoms, integumentary symptoms, neurological symptoms, psychiatric symptoms, endocrine symptoms, hematologic/lymphatic symptoms, and allergic/ immunologic symptoms.   The pertinent factors have been included in the HPI and below.  Patient Supplied Answers to Review of Systems      8/14/2024     7:54 PM   [facility] ROS   Constitutional Problems with sleep   Neurology Dizzy spells   Psychology Frequently feeling anxious   Ears, Nose, Throat Nasal congestion or drainage   Gastrointestinal/Genitourinary Heartburn/indigestion       Physical Examination   The patient underwent a physical examination as described below. The pertinent positive and negative findings are summarized after the description of the examination.  Constitutional: The patient's developmental and nutritional status was assessed. The patient's voice quality was assessed.  Head and Face: The head and face were inspected for deformities. The sinuses were palpated. The salivary glands were palpated.  Facial muscle strength was assessed bilaterally.  Eyes: Extraocular movements and primary gaze alignment were assessed.  Ears, Nose, Mouth and Throat: The ears and nose were examined for deformities. The nasal septum, mucosa, and turbinates were inspected by anterior rhinoscopy. The lips, teeth, and gums were examined for abnormalities. The oral mucosa, tongue, palate, tonsils, lateral and posterior pharynx were inspected for the presence of asymmetry or mucosal lesions.    Neck: The tracheal position was noted, and the neck mass palpated to determine if there were any asymmetries, abnormal neck masses, thyromegally, or thyroid nodules.  Respiratory: The nature of the breathing and chest expansion/symmetry was observed.  Cardiovascular: The patient was examined to determine the presence of any edema or jugular venous distension.  Abdomen: The contour of the abdomen was noted.  Lymphatic: The patient was examined for infraclavicular lymphadenopathy.  Musculoskeletal: The patient was inspected for the presence of skeletal deformities.  Extremities: The extremities were examined for any clubbing or cyanosis.  Skin: The skin was examined for inflammatory or neoplastic conditions.  Neurologic: The patient's orientation, mood, and affect were noted. The cranial nerve  functions were examined.  Other pertinent positive and negative findings on physical examination:   OC/OP: no lesions, uvula midline, soft palate elevates symmetrically   Neck: no lesions, no TH tenderness to palpation     All other physical examination findings were within normal limits and noncontributory.    Procedures   Flexible laryngoscopy (CPT 51040)        Pre-procedure diagnosis: dysphonia  Post-procedure diagnosis: same as above  Indication for procedure: Ms. Orona is a 32 year old female with see above  Procedure(s): Fiberoptic Laryngoscopy     Details of Procedure: After informed consent was obtained, the patient was seated in the examination  chair.  The areas of the nasopharynx as well as the hypopharynx were anesthetized with topical 4% lidocaine with 0.25% phenylephrine atomizer.  Examination of the base of tongue was performed first.  Attention was directed to any evidence of masses in the area or evidence of leukoplakia or candidal infection.  Attention was directed to the epiglottis where its size and position was determined and its movement on phonation of the vowel  e .  The piriform sinuses were then inspected for any mass lesions or pooling of secretions.  Attention was then directed to the larynx. The vocal folds were inspected for infection or any areas of leukoplakia, for masses, polypoid degeneration, or hemorrhage.  Having done this, the arytenoids and vocal processes were inspected for erythema or evidence of granuloma formation.  The posterior commissure was then inspected for evidence of inflammatory changes in the mucosa and heaping up of mucosal tissue. The patient was then instructed to say the vowel  e .  Adduction of vocal folds to the midline was observed for any evidence of paresis or paralysis of the larynx or asymmetry in rotation of the larynx to the left or right. The patient was asked to breathe and the degree of abduction was noted bilaterally.  Subglottic view of the larynx was obtained for any additional mass lesions or mucosal changes.  Finally the post cricoid was examined for evidence of pooling of secretions, as well as the pharyngeal wall mucosa.   Anesthesia type: 0.25% phenylephrine     Findings:  Anatomic/physiological deviations: RNC, grade 2, 60% stenosis, persistent right vocal fold paresis               Right vocal process: No restriction of mobility   Left vocal process: No restriction of mobility  Glottal gap: Complete glottal closure  Supraglottic structures: Normal  Hypopharynx: Normal      Estimated Blood Loss: minimal  Complications: None  Disposition: Patient tolerated the procedure well        Review  "of Relevant Clinical Data   I personally reviewed:  Labs:  No results found for: \"TSH\"  No results found for: \"NA\", \"CO2\", \"BUN\", \"CREAT\", \"GLUCOSE\", \"PHOS\"  No results found for: \"WBC\", \"HGB\", \"HCT\", \"MCV\", \"PLT\"  No results found for: \"PT\", \"PTT\", \"INR\"  No results found for: \"TERI\"  No components found for: \"RHEUMATOIDFACTOR\", \"RF\"  No results found for: \"CRP\"  No components found for: \"CKTOT\", \"URICACID\"  No components found for: \"C3\", \"C4\", \"DSDNAAB\", \"NDNAABIFA\"  No results found for: \"MPOAB\"    Patient reported Quality of Life (QOL) Measures   Patient Supplied Answers To VHI Questionnaire      1/2/2020     8:00 AM   Voice Handicap Index (VHI-10)   My voice makes it difficult for people to hear me 1   People have difficulty understanding me in a noisy room 1   My voice difficulties restrict my personal and social life.  0   I feel left out of conversations because of my voice 0   My voice problem causes me to lose income 0   I feel as though I have to strain to produce voice 0   The clarity of my voice is unpredictable 0   My voice problem upsets me 0   My voice makes me feel handicapped 0   People ask, \"What's wrong with your voice?\" 0   VHI-10 2         Patient Supplied Answers To EAT Questionnaire      1/17/2022    11:03 PM   Eating Assessment Tool (EAT-10)   My swallowing problem has caused me to lose weight 0   My swallowing problem interferes with my ability to go out for meals 0   Swallowing liquids takes extra effort 0   Swallowing solids takes extra effort 0   Swallowing pills takes extra effort 0   Swallowing is painful 0   The pleasure of eating is affected by my swallowing 0   When I swallow food sticks in my throat 0   I cough when I eat 0   Swallowing is stressful 0   EAT-10 0         Patient Supplied Answers To CSI Questionnaire      8/14/2024     7:56 PM   Cough Severity Index (CSI)   My cough is worse when I lie down 0   My coughing problem causes me to restrict my personal and social life 0 "   I tend to avoid places because of my cough problem 0   I feel embarrassed because of my coughing problem 1   People ask, ''What's wrong?'' because I cough a lot 0   I run out of air when I cough 1   My coughing problem affects my voice 2   My coughing problem limits my physical activity 1   My coughing problem upsets me 1   People ask me if I am sick because I cough a lot 0   CSI Score 6     Scribe Disclosure:   I, NATALIIA ALVARES, am serving as a scribe; to document services personally performed by Nadine Hoffmann MD -based on data collection and the provider's statements to me.     Provider Disclosure:  I agree with above History, Review of Systems, Physical exam and Plan.  I have reviewed the content of the documentation and have edited it as needed. I have personally performed the services documented here and the documentation accurately represents those services and the decisions I have made.      Electronically signed by:  Nadine Hoffmann MD    Laryngology    [facility]  Department of  Otolaryngology - Head and Neck Surgery  [facility] [place] [address]  Appointment line: [phone]  Fax: [phone]  [email or web address]

## 2024-08-15 ENCOUNTER — DOCUMENTATION ONLY (OUTPATIENT)
Dept: OTOLARYNGOLOGY | Facility: CLINIC | Age: 34
End: 2024-08-15

## 2024-08-15 ENCOUNTER — PREP FOR PROCEDURE (OUTPATIENT)
Dept: OTOLARYNGOLOGY | Facility: CLINIC | Age: 34
End: 2024-08-15

## 2024-08-15 ENCOUNTER — OFFICE VISIT (OUTPATIENT)
Dept: OTOLARYNGOLOGY | Facility: CLINIC | Age: 34
End: 2024-08-15
Payer: COMMERCIAL

## 2024-08-15 VITALS
SYSTOLIC BLOOD PRESSURE: 112 MMHG | DIASTOLIC BLOOD PRESSURE: 74 MMHG | WEIGHT: 162 LBS | BODY MASS INDEX: 26.03 KG/M2 | HEIGHT: 66 IN | HEART RATE: 81 BPM

## 2024-08-15 DIAGNOSIS — J38.6 SUBGLOTTIC STENOSIS: Primary | ICD-10-CM

## 2024-08-15 PROCEDURE — 99215 OFFICE O/P EST HI 40 MIN: CPT | Mod: 25 | Performed by: OTOLARYNGOLOGY

## 2024-08-15 PROCEDURE — 31575 DIAGNOSTIC LARYNGOSCOPY: CPT | Performed by: OTOLARYNGOLOGY

## 2024-08-15 RX ORDER — CEFAZOLIN SODIUM 2 G/50ML
2 SOLUTION INTRAVENOUS SEE ADMIN INSTRUCTIONS
OUTPATIENT
Start: 2024-08-15

## 2024-08-15 RX ORDER — SODIUM CHLORIDE FOR INHALATION 0.9 %
5 VIAL, NEBULIZER (ML) INHALATION PRN
Qty: 600 ML | Refills: 5 | Status: SHIPPED | OUTPATIENT
Start: 2024-08-15

## 2024-08-15 RX ORDER — GUAIFENESIN 600 MG/1
1200 TABLET, EXTENDED RELEASE ORAL 2 TIMES DAILY
Qty: 60 TABLET | Refills: 2 | Status: SHIPPED | OUTPATIENT
Start: 2024-08-15

## 2024-08-15 RX ORDER — CEFAZOLIN SODIUM 2 G/50ML
2 SOLUTION INTRAVENOUS
OUTPATIENT
Start: 2024-08-15

## 2024-08-15 NOTE — PROGRESS NOTES
DME order for nebulizer faxed to [facility]. Fax number [phone]. 4 [facility] faxed.

## 2024-08-15 NOTE — LETTER
8/15/2024       RE: Alise Orona  [address] [facility] [address] [place] [id number]     Dear Colleague,    Thank you for referring your patient, Alise Orona, to the [facility] EAR NOSE AND THROAT CLINIC [place] at [facility]. Please see a copy of my visit note below.          [facility]   at the [facility]   Otolaryngology Clinic     Patient: Alise Orona    MRN: [id number]    : 1990    Age/Gender: 34 year old female  Date of Service: 8/15/2024  Rendering Provider:   Nadine Hoffmann MD         Impression & Plan     IMPRESSION: Ms. Orona is a 34 year old female who is being seen for the followin.    Dyspnea  - due to subglottic stenosis   - intubation history possible in  due to an ovarian cyst - not sure  - diabetes - HgA1c 21 - 5.7   - autoimmune labs - from 10/21/19 - TERI, RF, ANCA - negative  - biopsy none  - imaging none  - patient symptoms with dyspnea  - scope showed grade 3 subglottic stenosis on 19  - discussed etiology of trauma (intubation), autoimmune, diabetes or idiopathic. In this case this is most likely DM and prior intubation induced  - discussed treatment with observation, conservative management with oral abx/steroids/reflux precautions, steroid injections, endoscopic procedures, open resection and bypass procedure with tracheotomy  - patient elects to proceed with endoscopic surgery  - s/p  MDL with CO2 laser resection, dilation and steroid injection on 19  - symptoms have come back for the past 6 months (reported today 21)  - scope today shows grade 3, 80% narrowing subglottically  - peak flow meter today is 200  - discussed she needs another dilation after which we will discuss long term management again to establish goals and a follow up plan  - discussed given her severity of stenosis as well as her weight, her surgery is high risk and she is at  risk of a trach   - s/p flex bronch with CO2 laser and dilation to 12 mmHg 12/22/2021  - symptoms today 01/18/2022 are improved with better breathing, peak flow meter is 375  - scope today 01/18/2022 shows subglottic stenosis improved, grade 1, 20% narrowing, some mild white granulation anteriorly, right vocal fold slight paresis, minimal restriction on the right   - discussed weight management and diabetic control   - discussed steroid injection, risks, benefits and alternatives were discussed  - symptoms 12/14/2022 are good with stable peak flow of 375  - she is undergoing vertical gastric sleeve on 12/19/22  - scope shows grade 2 60% narrowing  - discussed that there is some narrowing and concern for safely placing endotracheal tube through. Would recommend using an LMA if possible and if not there is the risk of not being able to intubate and having to abort the procedure.   - As such would recommend predilation prior to surgery. Since her surgery is on Monday she would need this tomorrow.   - discussed that with her current stenosis a size 5 intubation may not work on Monday and therefore we could pre-dilate her tomorrow prior to surgery  - called and spoke to her anesthesia team and they informed me they are unable to use LMA and would prefer size 6.0 trach. Therefore we will proceed with predilation to prepare for passage of size 6.0 tube ( [facility] (# is [phone]))  - s/p flex bronch with CO2 laser and dilation to 13.5mmHg 12/16/22  - symptoms 8/15/2024 are worsened, feels more difficulty with stairs now, though her peak flow meter is 375, had bariatric surgery, now has intermittent reflux treated with omeprazole PRN  - scope shows grade 2, 60% stenosis, persistent right vocal fold paresis   - discussed observation vs surgery - would like to proceed with surgery  - given stenosis is not severe, question paradoxical vocal fold motion  component - will address if she is symptomatic after surgery  -  also has significant mucus - question reflux component - if still present after surgery and nebulizer treatment will refer to GI   Plan  - nebulizer  - needs nebulizer - this is medically necessary to prevent mucus plugging and difficulty breathing  - mucinex  - omeprazole PRN  - schedule surgery  - if still breathing issues post surgery - will see with SLP for paradoxical vocal fold motion         2. 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 BRONCHOSCOPY, FIBEROPTIC steroid injection 12/12/22  Interval History   HISTORY OF PRESENT ILLNESS: Ms. Orona is a 34 year old female is being followed for subglottic stenosis. s/p MDL with CO2 laser resection and dilation with steroid injection on 11/15/19. She was initially seen on 11/01/2019 before her surgery. Please refer to 11/14/2021 note for full history.   Of note, she has acid reflux and a hx of a gastric bypass.    Today, she presents for follow up. she reports:  - She notices symptoms more such as heavy breathing with increased activity such as stairs  - She states that her output is 375 and that it is  typically low  - She reports frequent throat clearing and thick mucous production  - She does not have a nebulizer or use an inhaler  - She takes reflux medication as she states she has gotten reflux since the surgery. She is taking this medication, Omeprazole, pretty much every day.  - She is regularly following up with her GI surgeon who suggested taking Omeprazole as needed. She was told by this provider that a revision bypass is an option if her reflux gets severe.     PAST MEDICAL HISTORY:   Past Medical History:   Diagnosis Date     Attention deficit hyperactivity disorder (ADHD), predominantly inattentive type 6/8/2015     Binge-eating disorder, mild 6/8/2015     Family history of thyroid cancer 12/18/2014     Generalized anxiety disorder 11/15/2013     Major depressive disorder, recurrent episode, in full remission (H) 4/16/2014     Menorrhagia 10/2/2012     Obesity 12/8/2014     Ovarian cyst 10/2/2012     Screening for malignant neoplasm of cervix 12/22/2014 2010-2012 NILM 2014 NILM, hpv negative 2017 NILM     27 y.o. Plan:   Cotesting 5/2020    BV  ; Pap test history     Subglottic stenosis 11/1/2019    Added automatically from request for surgery [id number]       PAST SURGICAL HISTORY:   Past Surgical History:   Procedure Laterality Date     BRONCHOSCOPY FLEXIBLE N/A 12/16/2022    Procedure: BRONCHOSCOPY, FIBEROPTIC;  Surgeon: Nadine Hoffmann MD;  Location: [facility] OR     GYN SURGERY      lap removal of ovarian cyst      INJECT STEROID (LOCATION) N/A 11/5/2019    Procedure: INJECTION, STEROID;  Surgeon: Nadine Ayers MD;  Location: [facility] OR     INJECT STEROID (LOCATION) N/A 12/22/2021    Procedure: steroid injection;  Surgeon: Nadine Hoffmann MD;  Location: [facility]     INJECT STEROID (LOCATION) N/A 12/16/2022    Procedure: steroid injection;  Surgeon: Nadine Hoffmann MD;  Location: [facility] OR     LARYNGOSCOPY, FLEXIBLE WITH INJECTION N/A 2/2/2022    Procedure: LARYNGOSCOPY, FLEXIBLE WITH INJECTION;  Surgeon: Nadine Hoffmann  MD;  Location: [facility] OR     LARYNGOSCOPY, FLEXIBLE WITH INJECTION N/A 3/18/2022    Procedure: LARYNGOSCOPY, FLEXIBLE WITH INJECTION;  Surgeon: Nadine Hoffmann MD;  Location: [facility] OR     LARYNGOSCOPY, FLEXIBLE WITH INJECTION N/A 4/15/2022    Procedure: LARYNGOSCOPY, FLEXIBLE;  Surgeon: Nadine Hoffmann MD;  Location: [facility] OR     LARYNGOSCOPY, FLEXIBLE WITH INJECTION N/A 5/27/2022    Procedure: LARYNGOSCOPY, FLEXIBLE WITH INJECTION;  Surgeon: Nadine Hoffmann MD;  Location: [facility] OR     LARYNGOSCOPY, FLEXIBLE WITH INJECTION N/A 6/17/2022    Procedure: LARYNGOSCOPY, FLEXIBLE WITH INJECTION;  Surgeon: Nadine Hoffmann MD;  Location: [facility] OR     LARYNGOSCOPY, FLEXIBLE WITH INJECTION N/A 7/15/2022    Procedure: LARYNGOSCOPY, FLEXIBLE WITH INJECTION;  Surgeon: Nadine Hoffmann MD;  Location: [facility] OR     LASER CO2 LARYNGOSCOPY N/A 12/22/2021    Procedure: flexible bronchoscopy, carbon dioxide laser resection of stenosis, CRE balloon dilation;  Surgeon: Nadine Hoffmann MD;  Location: [facility] OR     LASER CO2 LARYNGOSCOPY N/A 12/16/2022    Procedure: MICROLARYNGOSCOPY, carbon dioxide laser resection of stenosis, CRE balloon dilation;  Surgeon: Nadine Hoffmann MD;  Location: [facility] OR     LASER CO2 LARYNGOSCOPY, COMPLEX N/A 11/5/2019    Procedure: microdirect laryngoscopy, flexible bronchoscopy, flexible CO2 laser laryngoscopy with excision of stenosis, laryngoscopy with dilation, flexible esophagoscopy;  Surgeon: Nadine Ayers MD;  Location: [facility] OR     WISDOM TEETH EXTRACTION         CURRENT MEDICATIONS:   Current Outpatient Medications:      buPROPion (WELLBUTRIN XL) 300 MG 24 hr tablet, Take 300 mg by mouth every morning , Disp: , Rfl:      busPIRone (BUSPAR) 5 MG tablet, Take one (1) tablet by mouth every morning AND two (2) tablets at bedtime, Disp: , Rfl:      cholecalciferol 50 MCG (2000 UT) tablet, Take 2,000 Units by mouth, Disp: , Rfl:      clonazePAM (KLONOPIN) 0.5 MG tablet, Take one half (0.5) tablets by mouth twice a day AND two (2)  tablets at bedtime, Disp: , Rfl:      clonazePAM (KLONOPIN) 1 MG tablet, Take 1 mg by mouth nightly as needed, Disp: , Rfl:      Escitalopram Oxalate (LEXAPRO PO), Take 20 mg by mouth daily, Disp: , Rfl:      fluticasone (FLONASE) 50 MCG/ACT nasal spray, Spray 2 sprays into both nostrils daily, Disp: 9.9 mL, Rfl: 3     fluticasone (FLOVENT HFA) 220 MCG/ACT inhaler, Inhale 2 puffs into the lungs 2 times daily, Disp: 12 g, Rfl: 3     norgestimate-ethinyl estradiol (ORTHO-CYCLEN, SPRINTEC) 0.25-35 MG-MCG tablet, Take 1 tablet by mouth daily, Disp: , Rfl:      pimecrolimus (ELIDEL) 1 % external cream, Apply topically 2 times daily, Disp: , Rfl:      VYVANSE 10 MG capsule, Take one (1) capsule by mouth every morning, Disp: , Rfl:      acetaminophen (TYLENOL) 325 MG tablet, Take 2 tablets (650 mg) by mouth every 4 hours as needed for mild pain, Disp: 50 tablet, Rfl: 0     acetaminophen (TYLENOL) 325 MG tablet, Take 2 tablets (650 mg) by mouth every 4 hours as needed for mild pain, Disp: 50 tablet, Rfl: 0     busPIRone (BUSPAR) 10 MG tablet, Take by mouth 2 times daily , Disp: , Rfl:      omeprazole (PRILOSEC) 40 MG DR capsule, Take 1 capsule (40 mg) by mouth daily before breakfast, Disp: 90 capsule, Rfl: 3     ondansetron (ZOFRAN-ODT) 4 MG ODT tab, Take 1-2 tablets (4-8 mg) by mouth every 8 hours as needed for nausea, Disp: 4 tablet, Rfl: 0     oxyCODONE (ROXICODONE) 5 MG tablet, Take 1 tablet (5 mg) by mouth every 6 hours as needed for severe pain, Disp: 6 tablet, Rfl: 0     sulfamethoxazole-trimethoprim (BACTRIM DS) 800-160 MG tablet, Take 1 tablet by mouth 2 times daily, Disp: 14 tablet, Rfl: 0     sulfamethoxazole-trimethoprim (BACTRIM DS) 800-160 MG tablet, Take 1 tablet by mouth 2 times daily, Disp: 20 tablet, Rfl: 0    ALLERGIES: Patient has no known allergies.    SOCIAL HISTORY:    Social History     Socioeconomic History     Marital status: Single     Spouse name: Not on file     Number of children: Not on file      Years of education: Not on file     Highest education level: Not on file   Occupational History     Not on file   Tobacco Use     Smoking status: Never     Smokeless tobacco: Never   Substance and Sexual Activity     Alcohol use: Not Currently     Drug use: Never     Sexual activity: Not on file   Other Topics Concern     Parent/sibling w/ CABG, MI or angioplasty before 65F 55M? Not Asked   Social History Narrative     Not on file     Social Determinants of Health     Financial Resource Strain: Not on file   Food Insecurity: Not on file   Transportation Needs: Not on file   Physical Activity: Not on file   Stress: Not on file   Social Connections: Not on file   Interpersonal Safety: Not on file   Housing Stability: Not on file         FAMILY HISTORY:   Family History   Problem Relation Age of Onset     Depression Mother      Substance Abuse Father      Depression Maternal Grandmother      Substance Abuse Sister      Depression Other      Bipolar Disorder Other      Anxiety Disorder Other      Schizophrenia Other      Substance Abuse Other       Non-contributory for problems with anesthesia    REVIEW OF SYSTEMS:   The patient was asked a 14 point review of systems regarding constitutional symptoms, eye symptoms, ears, nose, mouth, throat symptoms, cardiovascular symptoms, respiratory symptoms, gastrointestinal symptoms, genitourinary symptoms, musculoskeletal symptoms, integumentary symptoms, neurological symptoms, psychiatric symptoms, endocrine symptoms, hematologic/lymphatic symptoms, and allergic/ immunologic symptoms.   The pertinent factors have been included in the HPI and below.  Patient Supplied Answers to Review of Systems      8/14/2024     7:54 PM   [facility] ROS   Constitutional Problems with sleep   Neurology Dizzy spells   Psychology Frequently feeling anxious   Ears, Nose, Throat Nasal congestion or drainage   Gastrointestinal/Genitourinary Heartburn/indigestion       Physical Examination   The  patient underwent a physical examination as described below. The pertinent positive and negative findings are summarized after the description of the examination.  Constitutional: The patient's developmental and nutritional status was assessed. The patient's voice quality was assessed.  Head and Face: The head and face were inspected for deformities. The sinuses were palpated. The salivary glands were palpated. Facial muscle strength was assessed bilaterally.  Eyes: Extraocular movements and primary gaze alignment were assessed.  Ears, Nose, Mouth and Throat: The ears and nose were examined for deformities. The nasal septum, mucosa, and turbinates were inspected by anterior rhinoscopy. The lips, teeth, and gums were examined for abnormalities. The oral mucosa, tongue, palate, tonsils, lateral and posterior pharynx were inspected for the presence of asymmetry or mucosal lesions.    Neck: The tracheal position was noted, and the neck mass palpated to determine if there were any asymmetries, abnormal neck masses, thyromegally, or thyroid nodules.  Respiratory: The nature of the breathing and chest expansion/symmetry was observed.  Cardiovascular: The patient was examined to determine the presence of any edema or jugular venous distension.  Abdomen: The contour of the abdomen was noted.  Lymphatic: The patient was examined for infraclavicular lymphadenopathy.  Musculoskeletal: The patient was inspected for the presence of skeletal deformities.  Extremities: The extremities were examined for any clubbing or cyanosis.  Skin: The skin was examined for inflammatory or neoplastic conditions.  Neurologic: The patient's orientation, mood, and affect were noted. The cranial nerve  functions were examined.  Other pertinent positive and negative findings on physical examination:   OC/OP: no lesions, uvula midline, soft palate elevates symmetrically   Neck: no lesions, no TH tenderness to palpation     All other physical  examination findings were within normal limits and noncontributory.    Procedures   Flexible laryngoscopy (CPT 73589)        Pre-procedure diagnosis: dysphonia  Post-procedure diagnosis: same as above  Indication for procedure: Ms. Orona is a 32 year old female with see above  Procedure(s): Fiberoptic Laryngoscopy     Details of Procedure: After informed consent was obtained, the patient was seated in the examination chair.  The areas of the nasopharynx as well as the hypopharynx were anesthetized with topical 4% lidocaine with 0.25% phenylephrine atomizer.  Examination of the base of tongue was performed first.  Attention was directed to any evidence of masses in the area or evidence of leukoplakia or candidal infection.  Attention was directed to the epiglottis where its size and position was determined and its movement on phonation of the vowel  e .  The piriform sinuses were then inspected for any mass lesions or pooling of secretions.  Attention was then directed to the larynx. The vocal folds were inspected for infection or any areas of leukoplakia, for masses, polypoid degeneration, or hemorrhage.  Having done this, the arytenoids and vocal processes were inspected for erythema or evidence of granuloma formation.  The posterior commissure was then inspected for evidence of inflammatory changes in the mucosa and heaping up of mucosal tissue. The patient was then instructed to say the vowel  e .  Adduction of vocal folds to the midline was observed for any evidence of paresis or paralysis of the larynx or asymmetry in rotation of the larynx to the left or right. The patient was asked to breathe and the degree of abduction was noted bilaterally.  Subglottic view of the larynx was obtained for any additional mass lesions or mucosal changes.  Finally the post cricoid was examined for evidence of pooling of secretions, as well as the pharyngeal wall mucosa.   Anesthesia type: 0.25% phenylephrine    "  Findings:  Anatomic/physiological deviations: RNC, grade 2, 60% stenosis, persistent right vocal fold paresis               Right vocal process: No restriction of mobility   Left vocal process: No restriction of mobility  Glottal gap: Complete glottal closure  Supraglottic structures: Normal  Hypopharynx: Normal      Estimated Blood Loss: minimal  Complications: None  Disposition: Patient tolerated the procedure well        Review of Relevant Clinical Data   I personally reviewed:  Labs:  No results found for: \"TSH\"  No results found for: \"NA\", \"CO2\", \"BUN\", \"CREAT\", \"GLUCOSE\", \"PHOS\"  No results found for: \"WBC\", \"HGB\", \"HCT\", \"MCV\", \"PLT\"  No results found for: \"PT\", \"PTT\", \"INR\"  No results found for: \"TERI\"  No components found for: \"RHEUMATOIDFACTOR\", \"RF\"  No results found for: \"CRP\"  No components found for: \"CKTOT\", \"URICACID\"  No components found for: \"C3\", \"C4\", \"DSDNAAB\", \"NDNAABIFA\"  No results found for: \"MPOAB\"    Patient reported Quality of Life (QOL) Measures   Patient Supplied Answers To VHI Questionnaire      1/2/2020     8:00 AM   Voice Handicap Index (VHI-10)   My voice makes it difficult for people to hear me 1   People have difficulty understanding me in a noisy room 1   My voice difficulties restrict my personal and social life.  0   I feel left out of conversations because of my voice 0   My voice problem causes me to lose income 0   I feel as though I have to strain to produce voice 0   The clarity of my voice is unpredictable 0   My voice problem upsets me 0   My voice makes me feel handicapped 0   People ask, \"What's wrong with your voice?\" 0   VHI-10 2         Patient Supplied Answers To EAT Questionnaire      1/17/2022    11:03 PM   Eating Assessment Tool (EAT-10)   My swallowing problem has caused me to lose weight 0   My swallowing problem interferes with my ability to go out for meals 0   Swallowing liquids takes extra effort 0   Swallowing solids takes extra effort 0   Swallowing " pills takes extra effort 0   Swallowing is painful 0   The pleasure of eating is affected by my swallowing 0   When I swallow food sticks in my throat 0   I cough when I eat 0   Swallowing is stressful 0   EAT-10 0         Patient Supplied Answers To CSI Questionnaire      8/14/2024     7:56 PM   Cough Severity Index (CSI)   My cough is worse when I lie down 0   My coughing problem causes me to restrict my personal and social life 0   I tend to avoid places because of my cough problem 0   I feel embarrassed because of my coughing problem 1   People ask, ''What's wrong?'' because I cough a lot 0   I run out of air when I cough 1   My coughing problem affects my voice 2   My coughing problem limits my physical activity 1   My coughing problem upsets me 1   People ask me if I am sick because I cough a lot 0   CSI Score 6     Scribe Disclosure:   I, NATALIIA ALVARES, am serving as a scribe; to document services personally performed by Nadine Hoffmann MD -based on data collection and the provider's statements to me.     Provider Disclosure:  I agree with above History, Review of Systems, Physical exam and Plan.  I have reviewed the content of the documentation and have edited it as needed. I have personally performed the services documented here and the documentation accurately represents those services and the decisions I have made.      Electronically signed by:  Nadine Hoffmann MD    Laryngology    [facility]  Department of  Otolaryngology - Head and Neck Surgery  Clinics & Surgery Center  [address] [place] [address]  Appointment line: [phone]  Fax: [phone]  [email or web address]       Again, thank you for allowing me to participate in the care of your patient.      Sincerely,    Nadine Hoffmann MD

## 2024-08-15 NOTE — PATIENT INSTRUCTIONS
1.  You were seen in the ENT Clinic today by . If you have any questions or concerns after your appointment, please call [phone]. Press option #1 for scheduling related needs. Press option #3 for Nurse advice.    2.   has recommended  the following:   - schedule surgery   - use mucinex as needed for mucus control   - use nebulizer with saline as needed, up to 4 times daily to prevent plugging     3.  Plan is to return to clinic for post operative follow up. JING Marte LPN  [phone]  [facility] Otolaryngology

## 2024-08-19 ENCOUNTER — TELEPHONE (OUTPATIENT)
Dept: OTOLARYNGOLOGY | Facility: CLINIC | Age: 34
End: 2024-08-19
Payer: COMMERCIAL

## 2024-08-19 NOTE — TELEPHONE ENCOUNTER
Left patient a voicemail to schedule surgery for MICROLARYNGOSCOPY, flexible bronchoscopy, carbon dioxide laser resection of stenosis, CRE balloon dilation, steroid injection (N/A) with Dr. Hoffmann - Left Surgery Scheduling line for callback [phone]    Waleska Claudio on 8/19/2024 at 11:31 AM

## 2024-08-20 NOTE — TELEPHONE ENCOUNTER
Attempted to call patient to schedule surgery. Her voicemail is full    She Kan  8/20/2024 at 3:48 PM

## 2024-08-29 NOTE — TELEPHONE ENCOUNTER
Attempted to call patient to schedule surgery. Her voicemail is full    She Kan  8/29/2024 at 3:44 PM

## 2024-08-29 NOTE — TELEPHONE ENCOUNTER
Left patients mom a voicemail to schedule surgery for MICROLARYNGOSCOPY, flexible bronchoscopy, carbon dioxide laser resection of stenosis, CRE balloon dilation, steroid injection (N/A) with Dr. Hoffmann - Left Surgery Scheduling line for callback [phone]    She Kan  8/29/2024 at 3:45 PM

## 2024-09-04 NOTE — TELEPHONE ENCOUNTER
Attempted to call patient to schedule surgery. Her voicemail is full    She Kan  9/4/2024 at 9:13 AM

## 2024-09-13 ENCOUNTER — HOSPITAL ENCOUNTER (OUTPATIENT)
Facility: CLINIC | Age: 34
End: 2024-09-13
Attending: OTOLARYNGOLOGY | Admitting: OTOLARYNGOLOGY
Payer: COMMERCIAL

## 2024-09-13 ENCOUNTER — DOCUMENTATION ONLY (OUTPATIENT)
Dept: OTOLARYNGOLOGY | Facility: CLINIC | Age: 34
End: 2024-09-13
Payer: COMMERCIAL

## 2024-09-13 DIAGNOSIS — J45.998 OTHER ASTHMA: Primary | ICD-10-CM

## 2024-09-13 NOTE — PROGRESS NOTES
New DME order for nebulizer with updated diagnosis of other asthma was faxed to [facility]. Fax number [phone]. 3 pgs faxed.

## 2024-09-13 NOTE — TELEPHONE ENCOUNTER
Scheduled surgery with Dr. Hoffmann on 11/13/2024 - patient requested this day specifically and declined prior dates    Spoke with: Patient    Surgery is located at [facility]/[place]    Patient will be seen for their H&P by their PCP Ernestina Oleary NP within 30 days of surgery - Confirmed PCP on file is up to date     Does patient need a consult before upcoming surgery? No    Anesthesia type: General    Requested Imaging required for surgery: No    Patient is scheduled for their 4 week post op on 12/12/2024 at 845am with Dr. Hoffmann    Patient will receive their surgery packet via [employer] per their preference    Patient was not provided a start time for surgery & is aware they will receive this information 2-3 days before surgery    Additional comments: Patient said she was prescribed a nebulizer but her insurance said they would not cover it based on the diagnosis provided, would like a call to discuss     Patient was instructed to call back with any further questions or concerns.     Waleska Claudio on 9/13/2024 at 11:24 AM

## 2024-11-05 ENCOUNTER — MYC MEDICAL ADVICE (OUTPATIENT)
Dept: OTOLARYNGOLOGY | Facility: CLINIC | Age: 34
End: 2024-11-05

## 2024-12-17 RX ORDER — ONDANSETRON 2 MG/ML
4 INJECTION INTRAMUSCULAR; INTRAVENOUS EVERY 30 MIN PRN
Status: CANCELLED | OUTPATIENT
Start: 2024-12-17

## 2024-12-17 RX ORDER — FENTANYL CITRATE 50 UG/ML
50 INJECTION, SOLUTION INTRAMUSCULAR; INTRAVENOUS EVERY 5 MIN PRN
Status: CANCELLED | OUTPATIENT
Start: 2024-12-17

## 2024-12-17 RX ORDER — AMOXICILLIN 500 MG
1200 CAPSULE ORAL DAILY
COMMUNITY
End: 2024-12-18 | Stop reason: HOSPADM

## 2024-12-17 RX ORDER — DEXAMETHASONE SODIUM PHOSPHATE 4 MG/ML
4 INJECTION, SOLUTION INTRA-ARTICULAR; INTRALESIONAL; INTRAMUSCULAR; INTRAVENOUS; SOFT TISSUE
Status: CANCELLED | OUTPATIENT
Start: 2024-12-17

## 2024-12-17 RX ORDER — FAMOTIDINE 20 MG/1
20 TABLET, FILM COATED ORAL 2 TIMES DAILY PRN
COMMUNITY
End: 2024-12-18 | Stop reason: HOSPADM

## 2024-12-17 RX ORDER — NALOXONE HYDROCHLORIDE 0.4 MG/ML
0.1 INJECTION, SOLUTION INTRAMUSCULAR; INTRAVENOUS; SUBCUTANEOUS
Status: CANCELLED | OUTPATIENT
Start: 2024-12-17

## 2024-12-17 RX ORDER — LIDOCAINE 40 MG/G
CREAM TOPICAL
Status: CANCELLED | OUTPATIENT
Start: 2024-12-17

## 2024-12-17 RX ORDER — OXYCODONE HYDROCHLORIDE 10 MG/1
10 TABLET ORAL
Status: CANCELLED | OUTPATIENT
Start: 2024-12-17

## 2024-12-17 RX ORDER — OXYCODONE HYDROCHLORIDE 5 MG/1
5 TABLET ORAL
Status: CANCELLED | OUTPATIENT
Start: 2024-12-17

## 2024-12-17 RX ORDER — ACETAMINOPHEN 325 MG/1
975 TABLET ORAL ONCE
Status: CANCELLED | OUTPATIENT
Start: 2024-12-17 | End: 2024-12-17

## 2024-12-17 RX ORDER — FENTANYL CITRATE 50 UG/ML
25 INJECTION, SOLUTION INTRAMUSCULAR; INTRAVENOUS EVERY 5 MIN PRN
Status: CANCELLED | OUTPATIENT
Start: 2024-12-17

## 2024-12-17 RX ORDER — DEXTROAMPHETAMINE SACCHARATE, AMPHETAMINE ASPARTATE, DEXTROAMPHETAMINE SULFATE AND AMPHETAMINE SULFATE 7.5; 7.5; 7.5; 7.5 MG/1; MG/1; MG/1; MG/1
10 TABLET ORAL 3 TIMES DAILY
COMMUNITY
End: 2024-12-18 | Stop reason: HOSPADM

## 2024-12-17 RX ORDER — QUETIAPINE FUMARATE 50 MG/1
50 TABLET, FILM COATED ORAL AT BEDTIME
COMMUNITY
End: 2024-12-18 | Stop reason: HOSPADM

## 2024-12-17 RX ORDER — ONDANSETRON 4 MG/1
4 TABLET, ORALLY DISINTEGRATING ORAL EVERY 30 MIN PRN
Status: CANCELLED | OUTPATIENT
Start: 2024-12-17

## 2024-12-17 RX ORDER — HYDROMORPHONE HYDROCHLORIDE 1 MG/ML
0.2 INJECTION, SOLUTION INTRAMUSCULAR; INTRAVENOUS; SUBCUTANEOUS EVERY 5 MIN PRN
Status: CANCELLED | OUTPATIENT
Start: 2024-12-17

## 2024-12-17 RX ORDER — HYDROMORPHONE HYDROCHLORIDE 1 MG/ML
0.4 INJECTION, SOLUTION INTRAMUSCULAR; INTRAVENOUS; SUBCUTANEOUS EVERY 5 MIN PRN
Status: CANCELLED | OUTPATIENT
Start: 2024-12-17

## 2024-12-17 RX ORDER — HALOPERIDOL 5 MG/ML
1 INJECTION INTRAMUSCULAR
Status: CANCELLED | OUTPATIENT
Start: 2024-12-17

## 2024-12-18 ENCOUNTER — TELEPHONE (OUTPATIENT)
Dept: OTOLARYNGOLOGY | Facility: CLINIC | Age: 34
End: 2024-12-18
Payer: COMMERCIAL

## 2024-12-18 ENCOUNTER — PREP FOR PROCEDURE (OUTPATIENT)
Dept: OTOLARYNGOLOGY | Facility: CLINIC | Age: 34
End: 2024-12-18
Payer: COMMERCIAL

## 2024-12-18 DIAGNOSIS — J38.6 SUBGLOTTIC STENOSIS: Primary | ICD-10-CM

## 2024-12-18 RX ORDER — CEFAZOLIN SODIUM 2 G/50ML
2 SOLUTION INTRAVENOUS SEE ADMIN INSTRUCTIONS
OUTPATIENT
Start: 2024-12-18

## 2024-12-18 RX ORDER — CEFAZOLIN SODIUM 2 G/50ML
2 SOLUTION INTRAVENOUS
OUTPATIENT
Start: 2024-12-18

## 2024-12-18 NOTE — TELEPHONE ENCOUNTER
Left patient a voicemail to schedule surgery for MICROLARYNGOSCOPY, flexible bronchoscopy, carbon dioxide laser resection of stenosis, CRE balloon dilation, steroid injection, possible biopsy with Dr. Hoffmann - Left Surgery Scheduling line for callback [phone]    Waleska Claudio on 12/18/2024 at 2:50 PM

## 2024-12-18 NOTE — TELEPHONE ENCOUNTER
Patient called in this morning   Has a cold  Will cancel surgery and reschedule for January  Will send abx/steroids if symptoms get worse  Breathing is good today

## 2024-12-18 NOTE — TELEPHONE ENCOUNTER
Scheduled surgery with Dr. Hoffmann on 1/15/2025    Spoke with: Patient    Surgery is located at [facility]/[place]    Patient will be seen for their H&P by their PCP Ernestina Oleary NP or someone in the same clinic within 30 days of surgery - Confirmed PCP on file is up to date     Does patient need a consult before upcoming surgery? No    Anesthesia type: General    Requested Imaging required for surgery: No    Patient is scheduled for their 4 week post op on 2/18/2025 at 8am with Dr. Hoffmann    Patient will receive their surgery packet via [employer] per their preference    Patient was not provided a start time for surgery & is aware they will receive this information 2-3 days before surgery    Additional comments: Patient was instructed to call back with any further questions or concerns.     Waleska Claudio on 12/18/2024 at 3:01 PM

## 2024-12-19 ENCOUNTER — PATIENT OUTREACH (OUTPATIENT)
Dept: OTOLARYNGOLOGY | Facility: CLINIC | Age: 34
End: 2024-12-19
Payer: COMMERCIAL

## 2024-12-19 NOTE — PROGRESS NOTES
Called patient to check in per Whit message. Patient is having a fever, shills, cough, lost voice, and fatigue. Writer recommended because of these symptoms it would be a good idea to go to urgent care and have a provider examine her to make sure she does not have an infection or pneumonia. Patient was agreeable and will update  care team once she's been seen by urgent care. Patient was agreeable and verbalized understanding of the plan of care.Velia Cook RN on 12/19/2024 at 8:08 AM

## 2025-01-14 ENCOUNTER — ANESTHESIA EVENT (OUTPATIENT)
Dept: SURGERY | Facility: CLINIC | Age: 35
End: 2025-01-14
Payer: COMMERCIAL

## 2025-01-14 RX ORDER — DEXTROAMPHETAMINE SACCHARATE, AMPHETAMINE ASPARTATE, DEXTROAMPHETAMINE SULFATE AND AMPHETAMINE SULFATE 2.5; 2.5; 2.5; 2.5 MG/1; MG/1; MG/1; MG/1
10 TABLET ORAL 3 TIMES DAILY
COMMUNITY

## 2025-01-14 ASSESSMENT — LIFESTYLE VARIABLES: TOBACCO_USE: 0

## 2025-01-14 NOTE — ANESTHESIA PREPROCEDURE EVALUATION
Anesthesia Pre-Procedure Evaluation    Patient: Alise Orona   MRN: [id number] : 1990        Procedure : Procedure(s):  MICROLARYNGOSCOPY carbon dioxide laser resection of stenosis, CRE balloon dilation possible biopsy  flexible bronchoscopy  Inject steroid          Past Medical History:   Diagnosis Date    Attention deficit hyperactivity disorder (ADHD), predominantly inattentive type 2015    Binge-eating disorder, mild 2015    Family history of thyroid cancer 2014    Generalized anxiety disorder 11/15/2013    Major depressive disorder, recurrent episode, in full remission 2014    Menorrhagia 10/2/2012    Obesity 2014    Ovarian cyst 10/2/2012    Screening for malignant neoplasm of cervix 2014-2012 NILM 2014 NILM, hpv negative  NILM     27 y.o. Plan:   Cotesting 2020    BV  ; Pap test history    Subglottic stenosis 2019    Added automatically from request for surgery [id number]      Past Surgical History:   Procedure Laterality Date    BRONCHOSCOPY FLEXIBLE N/A 2022    Procedure: BRONCHOSCOPY, FIBEROPTIC;  Surgeon: Nadine Hoffmann MD;  Location: [facility] OR    GYN SURGERY      lap removal of ovarian cyst     INJECT STEROID (LOCATION) N/A 2019    Procedure: INJECTION, STEROID;  Surgeon: Nadine Ayers MD;  Location: [facility] OR    INJECT STEROID (LOCATION) N/A 2021    Procedure: steroid injection;  Surgeon: Nadine Hoffmann MD;  Location: [facility] OR    INJECT STEROID (LOCATION) N/A 2022    Procedure: steroid injection;  Surgeon: Nadine Hoffmann MD;  Location: [facility] OR    LARYNGOSCOPY, FLEXIBLE WITH INJECTION N/A 2022    Procedure: LARYNGOSCOPY, FLEXIBLE WITH INJECTION;  Surgeon: Nadine Hoffmann MD;  Location: [facility] OR    LARYNGOSCOPY, FLEXIBLE WITH INJECTION N/A 3/18/2022    Procedure: LARYNGOSCOPY, FLEXIBLE WITH INJECTION;  Surgeon: Nadine Hoffmann MD;  Location: [facility] OR    LARYNGOSCOPY, FLEXIBLE WITH INJECTION N/A 4/15/2022    Procedure: LARYNGOSCOPY,  FLEXIBLE;  Surgeon: Nadine Hoffmann MD;  Location: [facility] OR    LARYNGOSCOPY, FLEXIBLE WITH INJECTION N/A 5/27/2022    Procedure: LARYNGOSCOPY, FLEXIBLE WITH INJECTION;  Surgeon: Nadine Hoffmann MD;  Location: [facility] OR    LARYNGOSCOPY, FLEXIBLE WITH INJECTION N/A 6/17/2022    Procedure: LARYNGOSCOPY, FLEXIBLE WITH INJECTION;  Surgeon: Nadine Hoffmann MD;  Location: [facility] OR    LARYNGOSCOPY, FLEXIBLE WITH INJECTION N/A 7/15/2022    Procedure: LARYNGOSCOPY, FLEXIBLE WITH INJECTION;  Surgeon: Nadine Hoffmnan MD;  Location: [facility] OR    LASER CO2 LARYNGOSCOPY N/A 12/22/2021    Procedure: flexible bronchoscopy, carbon dioxide laser resection of stenosis, CRE balloon dilation;  Surgeon: Nadine Hoffmann MD;  Location: [facility] OR    LASER CO2 LARYNGOSCOPY N/A 12/16/2022    Procedure: MICROLARYNGOSCOPY, carbon dioxide laser resection of stenosis, CRE balloon dilation;  Surgeon: Nadine Hoffmann MD;  Location: [facility] OR    LASER CO2 LARYNGOSCOPY, COMPLEX N/A 11/5/2019    Procedure: microdirect laryngoscopy, flexible bronchoscopy, flexible CO2 laser laryngoscopy with excision of stenosis, laryngoscopy with dilation, flexible esophagoscopy;  Surgeon: Nadine Ayers MD;  Location: [facility] OR    WISDOM TEETH EXTRACTION        No Known Allergies   Social History     Tobacco Use    Smoking status: Never    Smokeless tobacco: Never   Substance Use Topics    Alcohol use: Yes     Comment: occasional      Wt Readings from Last 1 Encounters:   08/15/24 73.5 kg (162 lb)        Anesthesia Evaluation   Pt has had prior anesthetic. Type: General.    No history of anesthetic complications       ROS/MED HX  ENT/Pulmonary: Comment:  Subglottic stenosis s/p dilation 2022. Possibly related to intubation in 2012 for OBGYN procedure.     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, good peak flows in August,    (-) tobacco use and asthma   Neurologic:    (-) no CVA and no TIA   Cardiovascular:  - neg cardiovascular ROS  (-) hypertension, CAD and murmur   METS/Exercise Tolerance:    "  Hematologic: Comments:    - neg hematologic  ROS     Musculoskeletal:  - neg musculoskeletal ROS     GI/Hepatic: Comment: S/p bariatric surgery - neg GI/hepatic ROS   (+) GERD,                   Renal/Genitourinary:  - neg Renal ROS     Endo:     (+)               Obesity,       Psychiatric/Substance Use:     (+) psychiatric history other (comment), anxiety and depression       Infectious Disease:  - neg infectious disease ROS     Malignancy:  - neg malignancy ROS     Other:            Physical Exam    Airway        Mallampati: II   TM distance: > 3 FB   Neck ROM: full   Mouth opening: > 3 cm    Respiratory Devices and Support         Dental     Comment: Teeth examined without any significant abnormality, patient denies loose, missing or chipped teeth or anything removable.         Cardiovascular          Rhythm and rate: regular and normal (-) no murmur    Pulmonary   pulmonary exam normal        breath sounds clear to auscultation           OUTSIDE LABS:  CBC: No results found for: \"WBC\", \"HGB\", \"HCT\", \"PLT\"  BMP:   Lab Results   Component Value Date    GLC 98 12/16/2022    GLC 73 12/22/2021     COAGS: No results found for: \"PTT\", \"INR\", \"FIBR\"  POC:   Lab Results   Component Value Date     (H) 11/05/2019    HCG Negative 11/05/2019     HEPATIC: No results found for: \"ALBUMIN\", \"PROTTOTAL\", \"ALT\", \"AST\", \"GGT\", \"ALKPHOS\", \"BILITOTAL\", \"BILIDIRECT\", \"PRISCILLA\"  OTHER: No results found for: \"PH\", \"LACT\", \"A1C\", \"MONTEZ\", \"PHOS\", \"MAG\", \"LIPASE\", \"AMYLASE\", \"TSH\", \"T4\", \"T3\", \"CRP\", \"SED\"    Anesthesia Plan    ASA Status:  3    NPO Status:  NPO Appropriate    Anesthesia Type: General.     - Airway: LMA   Induction: Intravenous, Propofol.   Maintenance: Balanced.        Consents    Anesthesia Plan(s) and associated risks, benefits, and realistic alternatives discussed. Questions answered and patient/representative(s) expressed understanding.     - Discussed:     - Discussed with:  Patient      - Extended " Intubation/Ventilatory Support Discussed: No.      - Patient is DNR/DNI Status: No     Use of blood products discussed: No .     Postoperative Care    Pain management: IV analgesics, Oral pain medications, Multi-modal analgesia.   PONV prophylaxis: Ondansetron (or other 5HT-3), Dexamethasone or Solumedrol, Background Propofol Infusion     Comments:    Other Comments: Discussed plan for general anesthetic with LMA, possibility for need for intubation or jet ventilation. Discussed risks of sore throat, post op pain/nausea, oropharyngeal damage, rare major complications.             Aditya Bateman MD    I have reviewed the pertinent notes and labs in the chart from the past 30 days and (re)examined the patient.  Any updates or changes from those notes are reflected in this note.

## 2025-01-15 ENCOUNTER — ANESTHESIA (OUTPATIENT)
Dept: SURGERY | Facility: CLINIC | Age: 35
End: 2025-01-15
Payer: COMMERCIAL

## 2025-01-15 ENCOUNTER — HOSPITAL ENCOUNTER (OUTPATIENT)
Facility: CLINIC | Age: 35
End: 2025-01-15
Attending: OTOLARYNGOLOGY | Admitting: OTOLARYNGOLOGY
Payer: COMMERCIAL

## 2025-01-15 VITALS
RESPIRATION RATE: 15 BRPM | WEIGHT: 180.12 LBS | DIASTOLIC BLOOD PRESSURE: 75 MMHG | TEMPERATURE: 98.6 F | HEART RATE: 87 BPM | OXYGEN SATURATION: 99 % | HEIGHT: 65 IN | BODY MASS INDEX: 30.01 KG/M2 | SYSTOLIC BLOOD PRESSURE: 121 MMHG

## 2025-01-15 DIAGNOSIS — J38.6 SUBGLOTTIC STENOSIS: ICD-10-CM

## 2025-01-15 PROCEDURE — 250N000011 HC RX IP 250 OP 636: Performed by: OTOLARYNGOLOGY

## 2025-01-15 PROCEDURE — 710N000012 HC RECOVERY PHASE 2, PER MINUTE: Performed by: OTOLARYNGOLOGY

## 2025-01-15 PROCEDURE — 250N000011 HC RX IP 250 OP 636: Performed by: NURSE ANESTHETIST, CERTIFIED REGISTERED

## 2025-01-15 PROCEDURE — 250N000013 HC RX MED GY IP 250 OP 250 PS 637

## 2025-01-15 PROCEDURE — 250N000009 HC RX 250: Performed by: OTOLARYNGOLOGY

## 2025-01-15 PROCEDURE — 250N000009 HC RX 250: Performed by: NURSE ANESTHETIST, CERTIFIED REGISTERED

## 2025-01-15 PROCEDURE — 272N000001 HC OR GENERAL SUPPLY STERILE: Performed by: OTOLARYNGOLOGY

## 2025-01-15 PROCEDURE — 272N000002 HC OR SUPPLY OTHER OPNP: Performed by: OTOLARYNGOLOGY

## 2025-01-15 PROCEDURE — C1726 CATH, BAL DIL, NON-VASCULAR: HCPCS | Performed by: OTOLARYNGOLOGY

## 2025-01-15 PROCEDURE — 250N000013 HC RX MED GY IP 250 OP 250 PS 637: Performed by: ANESTHESIOLOGY

## 2025-01-15 PROCEDURE — 999N000141 HC STATISTIC PRE-PROCEDURE NURSING ASSESSMENT: Performed by: OTOLARYNGOLOGY

## 2025-01-15 PROCEDURE — 360N000077 HC SURGERY LEVEL 4, PER MIN: Performed by: OTOLARYNGOLOGY

## 2025-01-15 PROCEDURE — 370N000017 HC ANESTHESIA TECHNICAL FEE, PER MIN: Performed by: OTOLARYNGOLOGY

## 2025-01-15 PROCEDURE — 710N000010 HC RECOVERY PHASE 1, LEVEL 2, PER MIN: Performed by: OTOLARYNGOLOGY

## 2025-01-15 PROCEDURE — 258N000003 HC RX IP 258 OP 636: Performed by: NURSE ANESTHETIST, CERTIFIED REGISTERED

## 2025-01-15 RX ORDER — PROPOFOL 10 MG/ML
INJECTION, EMULSION INTRAVENOUS CONTINUOUS PRN
Status: DISCONTINUED | OUTPATIENT
Start: 2025-01-15 | End: 2025-01-15

## 2025-01-15 RX ORDER — FENTANYL CITRATE 50 UG/ML
25 INJECTION, SOLUTION INTRAMUSCULAR; INTRAVENOUS EVERY 5 MIN PRN
Status: DISCONTINUED | OUTPATIENT
Start: 2025-01-15 | End: 2025-01-15 | Stop reason: HOSPADM

## 2025-01-15 RX ORDER — ACETAMINOPHEN 325 MG/1
975 TABLET ORAL ONCE
Status: COMPLETED | OUTPATIENT
Start: 2025-01-15 | End: 2025-01-15

## 2025-01-15 RX ORDER — OXYCODONE HYDROCHLORIDE 5 MG/1
5 TABLET ORAL EVERY 6 HOURS PRN
Qty: 4 TABLET | Refills: 0 | Status: SHIPPED | OUTPATIENT
Start: 2025-01-15

## 2025-01-15 RX ORDER — TRIAMCINOLONE ACETONIDE 40 MG/ML
INJECTION, SUSPENSION INTRA-ARTICULAR; INTRAMUSCULAR PRN
Status: DISCONTINUED | OUTPATIENT
Start: 2025-01-15 | End: 2025-01-15 | Stop reason: HOSPADM

## 2025-01-15 RX ORDER — CEFAZOLIN SODIUM/WATER 2 G/20 ML
2 SYRINGE (ML) INTRAVENOUS
Status: COMPLETED | OUTPATIENT
Start: 2025-01-15 | End: 2025-01-15

## 2025-01-15 RX ORDER — HYDROMORPHONE HCL IN WATER/PF 6 MG/30 ML
0.4 PATIENT CONTROLLED ANALGESIA SYRINGE INTRAVENOUS EVERY 5 MIN PRN
Status: DISCONTINUED | OUTPATIENT
Start: 2025-01-15 | End: 2025-01-15 | Stop reason: HOSPADM

## 2025-01-15 RX ORDER — DEXAMETHASONE SODIUM PHOSPHATE 4 MG/ML
INJECTION, SOLUTION INTRA-ARTICULAR; INTRALESIONAL; INTRAMUSCULAR; INTRAVENOUS; SOFT TISSUE PRN
Status: DISCONTINUED | OUTPATIENT
Start: 2025-01-15 | End: 2025-01-15

## 2025-01-15 RX ORDER — ONDANSETRON 4 MG/1
4 TABLET, ORALLY DISINTEGRATING ORAL EVERY 30 MIN PRN
Status: DISCONTINUED | OUTPATIENT
Start: 2025-01-15 | End: 2025-01-15 | Stop reason: HOSPADM

## 2025-01-15 RX ORDER — DEXAMETHASONE SODIUM PHOSPHATE 4 MG/ML
4 INJECTION, SOLUTION INTRA-ARTICULAR; INTRALESIONAL; INTRAMUSCULAR; INTRAVENOUS; SOFT TISSUE
Status: DISCONTINUED | OUTPATIENT
Start: 2025-01-15 | End: 2025-01-15 | Stop reason: HOSPADM

## 2025-01-15 RX ORDER — LIDOCAINE 40 MG/G
CREAM TOPICAL
Status: DISCONTINUED | OUTPATIENT
Start: 2025-01-15 | End: 2025-01-15 | Stop reason: HOSPADM

## 2025-01-15 RX ORDER — FLUTICASONE PROPIONATE 220 UG/1
2 AEROSOL, METERED RESPIRATORY (INHALATION) 2 TIMES DAILY
Qty: 12 G | Refills: 0 | Status: SHIPPED | OUTPATIENT
Start: 2025-01-15

## 2025-01-15 RX ORDER — NALOXONE HYDROCHLORIDE 0.4 MG/ML
0.1 INJECTION, SOLUTION INTRAMUSCULAR; INTRAVENOUS; SUBCUTANEOUS
Status: DISCONTINUED | OUTPATIENT
Start: 2025-01-15 | End: 2025-01-15 | Stop reason: HOSPADM

## 2025-01-15 RX ORDER — ONDANSETRON 2 MG/ML
4 INJECTION INTRAMUSCULAR; INTRAVENOUS EVERY 30 MIN PRN
Status: CANCELLED | OUTPATIENT
Start: 2025-01-15

## 2025-01-15 RX ORDER — DEXAMETHASONE SODIUM PHOSPHATE 4 MG/ML
4 INJECTION, SOLUTION INTRA-ARTICULAR; INTRALESIONAL; INTRAMUSCULAR; INTRAVENOUS; SOFT TISSUE
Status: CANCELLED | OUTPATIENT
Start: 2025-01-15

## 2025-01-15 RX ORDER — HYDROMORPHONE HCL IN WATER/PF 6 MG/30 ML
0.2 PATIENT CONTROLLED ANALGESIA SYRINGE INTRAVENOUS EVERY 5 MIN PRN
Status: DISCONTINUED | OUTPATIENT
Start: 2025-01-15 | End: 2025-01-15 | Stop reason: HOSPADM

## 2025-01-15 RX ORDER — NALOXONE HYDROCHLORIDE 0.4 MG/ML
0.1 INJECTION, SOLUTION INTRAMUSCULAR; INTRAVENOUS; SUBCUTANEOUS
Status: CANCELLED | OUTPATIENT
Start: 2025-01-15

## 2025-01-15 RX ORDER — QUETIAPINE FUMARATE 50 MG/1
50 TABLET, FILM COATED ORAL AT BEDTIME
COMMUNITY

## 2025-01-15 RX ORDER — OXYCODONE HYDROCHLORIDE 5 MG/1
5 TABLET ORAL
Status: COMPLETED | OUTPATIENT
Start: 2025-01-15 | End: 2025-01-15

## 2025-01-15 RX ORDER — OXYMETAZOLINE HYDROCHLORIDE 0.05 G/100ML
SPRAY NASAL PRN
Status: DISCONTINUED | OUTPATIENT
Start: 2025-01-15 | End: 2025-01-15 | Stop reason: HOSPADM

## 2025-01-15 RX ORDER — CEFAZOLIN SODIUM/WATER 2 G/20 ML
2 SYRINGE (ML) INTRAVENOUS SEE ADMIN INSTRUCTIONS
Status: DISCONTINUED | OUTPATIENT
Start: 2025-01-15 | End: 2025-01-15 | Stop reason: HOSPADM

## 2025-01-15 RX ORDER — LIDOCAINE HYDROCHLORIDE 20 MG/ML
INJECTION, SOLUTION INFILTRATION; PERINEURAL PRN
Status: DISCONTINUED | OUTPATIENT
Start: 2025-01-15 | End: 2025-01-15

## 2025-01-15 RX ORDER — PROPOFOL 10 MG/ML
INJECTION, EMULSION INTRAVENOUS PRN
Status: DISCONTINUED | OUTPATIENT
Start: 2025-01-15 | End: 2025-01-15

## 2025-01-15 RX ORDER — FENTANYL CITRATE 50 UG/ML
50 INJECTION, SOLUTION INTRAMUSCULAR; INTRAVENOUS EVERY 5 MIN PRN
Status: DISCONTINUED | OUTPATIENT
Start: 2025-01-15 | End: 2025-01-15 | Stop reason: HOSPADM

## 2025-01-15 RX ORDER — LIDOCAINE HYDROCHLORIDE 40 MG/ML
SOLUTION TOPICAL PRN
Status: DISCONTINUED | OUTPATIENT
Start: 2025-01-15 | End: 2025-01-15 | Stop reason: HOSPADM

## 2025-01-15 RX ORDER — METOPROLOL TARTRATE 1 MG/ML
1-2 INJECTION, SOLUTION INTRAVENOUS EVERY 5 MIN PRN
Status: DISCONTINUED | OUTPATIENT
Start: 2025-01-15 | End: 2025-01-15 | Stop reason: HOSPADM

## 2025-01-15 RX ORDER — SODIUM CHLORIDE, SODIUM LACTATE, POTASSIUM CHLORIDE, CALCIUM CHLORIDE 600; 310; 30; 20 MG/100ML; MG/100ML; MG/100ML; MG/100ML
INJECTION, SOLUTION INTRAVENOUS CONTINUOUS
Status: DISCONTINUED | OUTPATIENT
Start: 2025-01-15 | End: 2025-01-15 | Stop reason: HOSPADM

## 2025-01-15 RX ORDER — FENTANYL CITRATE 50 UG/ML
INJECTION, SOLUTION INTRAMUSCULAR; INTRAVENOUS PRN
Status: DISCONTINUED | OUTPATIENT
Start: 2025-01-15 | End: 2025-01-15

## 2025-01-15 RX ORDER — ONDANSETRON 2 MG/ML
4 INJECTION INTRAMUSCULAR; INTRAVENOUS EVERY 30 MIN PRN
Status: DISCONTINUED | OUTPATIENT
Start: 2025-01-15 | End: 2025-01-15 | Stop reason: HOSPADM

## 2025-01-15 RX ORDER — ONDANSETRON 4 MG/1
4 TABLET, ORALLY DISINTEGRATING ORAL EVERY 30 MIN PRN
Status: CANCELLED | OUTPATIENT
Start: 2025-01-15

## 2025-01-15 RX ORDER — SODIUM CHLORIDE, SODIUM LACTATE, POTASSIUM CHLORIDE, CALCIUM CHLORIDE 600; 310; 30; 20 MG/100ML; MG/100ML; MG/100ML; MG/100ML
INJECTION, SOLUTION INTRAVENOUS CONTINUOUS PRN
Status: DISCONTINUED | OUTPATIENT
Start: 2025-01-15 | End: 2025-01-15

## 2025-01-15 RX ORDER — HYDRALAZINE HYDROCHLORIDE 20 MG/ML
2.5-5 INJECTION INTRAMUSCULAR; INTRAVENOUS EVERY 10 MIN PRN
Status: DISCONTINUED | OUTPATIENT
Start: 2025-01-15 | End: 2025-01-15 | Stop reason: HOSPADM

## 2025-01-15 RX ORDER — ONDANSETRON 2 MG/ML
INJECTION INTRAMUSCULAR; INTRAVENOUS PRN
Status: DISCONTINUED | OUTPATIENT
Start: 2025-01-15 | End: 2025-01-15

## 2025-01-15 RX ORDER — AZITHROMYCIN 250 MG/1
TABLET, FILM COATED ORAL
Qty: 6 TABLET | Refills: 0 | Status: SHIPPED | OUTPATIENT
Start: 2025-01-15 | End: 2025-01-20

## 2025-01-15 RX ORDER — OXYCODONE HYDROCHLORIDE 10 MG/1
10 TABLET ORAL
Status: CANCELLED | OUTPATIENT
Start: 2025-01-15

## 2025-01-15 RX ADMIN — HYDROMORPHONE HYDROCHLORIDE 0.5 MG: 1 INJECTION, SOLUTION INTRAMUSCULAR; INTRAVENOUS; SUBCUTANEOUS at 09:51

## 2025-01-15 RX ADMIN — DEXAMETHASONE SODIUM PHOSPHATE 10 MG: 4 INJECTION, SOLUTION INTRA-ARTICULAR; INTRALESIONAL; INTRAMUSCULAR; INTRAVENOUS; SOFT TISSUE at 09:08

## 2025-01-15 RX ADMIN — LIDOCAINE HYDROCHLORIDE 100 MG: 20 INJECTION, SOLUTION INFILTRATION; PERINEURAL at 09:08

## 2025-01-15 RX ADMIN — PROPOFOL 200 MG: 10 INJECTION, EMULSION INTRAVENOUS at 09:08

## 2025-01-15 RX ADMIN — PHENYLEPHRINE HYDROCHLORIDE 200 MCG: 10 INJECTION INTRAVENOUS at 09:23

## 2025-01-15 RX ADMIN — ACETAMINOPHEN 975 MG: 325 TABLET, FILM COATED ORAL at 08:33

## 2025-01-15 RX ADMIN — SODIUM CHLORIDE, POTASSIUM CHLORIDE, SODIUM LACTATE AND CALCIUM CHLORIDE: 600; 310; 30; 20 INJECTION, SOLUTION INTRAVENOUS at 08:56

## 2025-01-15 RX ADMIN — Medication 1 LOZENGE: at 10:11

## 2025-01-15 RX ADMIN — PHENYLEPHRINE HYDROCHLORIDE 200 MCG: 10 INJECTION INTRAVENOUS at 09:28

## 2025-01-15 RX ADMIN — PHENYLEPHRINE HYDROCHLORIDE 200 MCG: 10 INJECTION INTRAVENOUS at 09:18

## 2025-01-15 RX ADMIN — HYDROMORPHONE HYDROCHLORIDE 0.5 MG: 1 INJECTION, SOLUTION INTRAMUSCULAR; INTRAVENOUS; SUBCUTANEOUS at 09:56

## 2025-01-15 RX ADMIN — FENTANYL CITRATE 100 MCG: 50 INJECTION INTRAMUSCULAR; INTRAVENOUS at 09:08

## 2025-01-15 RX ADMIN — OXYCODONE HYDROCHLORIDE 5 MG: 5 TABLET ORAL at 10:43

## 2025-01-15 RX ADMIN — PHENYLEPHRINE HYDROCHLORIDE 200 MCG: 10 INJECTION INTRAVENOUS at 09:34

## 2025-01-15 RX ADMIN — PROPOFOL 175 MCG/KG/MIN: 10 INJECTION, EMULSION INTRAVENOUS at 09:08

## 2025-01-15 RX ADMIN — MIDAZOLAM 2 MG: 1 INJECTION INTRAMUSCULAR; INTRAVENOUS at 08:56

## 2025-01-15 RX ADMIN — ONDANSETRON 4 MG: 2 INJECTION INTRAMUSCULAR; INTRAVENOUS at 09:19

## 2025-01-15 RX ADMIN — Medication 2 G: at 09:03

## 2025-01-15 ASSESSMENT — ACTIVITIES OF DAILY LIVING (ADL)
ADLS_ACUITY_SCORE: 36
ADLS_ACUITY_SCORE: 36
ADLS_ACUITY_SCORE: 35
ADLS_ACUITY_SCORE: 36
ADLS_ACUITY_SCORE: 35
ADLS_ACUITY_SCORE: 36
ADLS_ACUITY_SCORE: 36
ADLS_ACUITY_SCORE: 35
ADLS_ACUITY_SCORE: 36
ADLS_ACUITY_SCORE: 36

## 2025-01-15 NOTE — ANESTHESIA POSTPROCEDURE EVALUATION
Patient: Alise Orona    Procedure: Procedure(s):  Carbpn dioxide laser resection of stenosis, CRE balloon dilation  flexible bronchoscopy  Inject steroid       Anesthesia Type:  General    Note:  Disposition: Outpatient   Postop Pain Control: Uneventful            Sign Out: Well controlled pain   PONV: No   Neuro/Psych: Uneventful            Sign Out: Acceptable/Baseline neuro status   Airway/Respiratory: Uneventful            Sign Out: Acceptable/Baseline resp. status   CV/Hemodynamics: Uneventful            Sign Out: Acceptable CV status; No obvious hypovolemia; No obvious fluid overload   Other NRE: NONE   DID A NON-ROUTINE EVENT OCCUR? No           Last vitals:  Vitals Value Taken Time   /72 01/15/25 1015   Temp     Pulse 81 01/15/25 1018   Resp 14 01/15/25 1018   SpO2 100 % 01/15/25 1018   Vitals shown include unfiled device data.    Electronically Signed By: Kota Boyer MD  January 15, 2025  10:19 AM

## 2025-01-15 NOTE — ANESTHESIA PROCEDURE NOTES
Airway       Patient location during procedure: OR  Staff -        CRNA: Jesse Ellis APRN CRNA       Performed By: CRNA  Consent for Airway        Urgency: elective  Indications and Patient Condition       Indications for airway management: charmaine-procedural       Induction type:intravenous       Mask difficulty assessment: 0 - not attempted    Final Airway Details       Final airway type: supraglottic airway    Supraglottic Airway Details        Type: LMA       Brand: I-Gel       LMA size: 4    Post intubation assessment        Placement verified by: capnometry, equal breath sounds and chest rise        Number of attempts at approach: 1       Secured with: tape       Ease of procedure: easy       Dentition: Intact and Unchanged

## 2025-01-15 NOTE — ANESTHESIA CARE TRANSFER NOTE
Patient: Alise Orona    Procedure: Procedure(s):  Carbpn dioxide laser resection of stenosis, CRE balloon dilation  flexible bronchoscopy  Inject steroid       Diagnosis: Subglottic stenosis [J38.6]  Diagnosis Additional Information: No value filed.    Anesthesia Type:   General     Note:    Oropharynx: oropharynx clear of all foreign objects  Level of Consciousness: awake  Oxygen Supplementation: nasal cannula  Level of Supplemental Oxygen (L/min / FiO2): 2  Independent Airway: airway patency satisfactory and stable    Vital Signs Stable: post-procedure vital signs reviewed and stable  Report to RN Given: handoff report given  Patient transferred to: PACU  Comments: Pt to PACU with monitors/O2, airway/IV patent, reports pain<3/10, resting comfortably.    Handoff Report: Identifed the Patient, Identified the Reponsible Provider, Reviewed the pertinent medical history, Discussed the surgical course, Reviewed Intra-OP anesthesia mangement and issues during anesthesia, Set expectations for post-procedure period and Allowed opportunity for questions and acknowledgement of understanding      Vitals:  Vitals Value Taken Time   /68    Temp 36.5    Pulse 81 01/15/25 0958   Resp 11 01/15/25 0958   SpO2 100 % 01/15/25 0958   Vitals shown include unfiled device data.    Electronically Signed By: ITA Benson CRNA  January 15, 2025  9:59 AM

## 2025-01-15 NOTE — OP NOTE
Operative Note   Otolaryngology - Head and Neck Surgery       DATE OF OPERATION:   January 15, 2025    PREOPERATIVE DIAGNOSIS:   Subglottic stenosis      POSTOPERATIVE DIAGNOSIS:   Same    NAME OF OPERATION:   1. Flexible bronchoscopy with CO2 laser of subglottic stenosis.   2. Flexible bronchoscopy with balloon dilation of subglottic stenosis to 15 mmHg   3. Flexible bronchoscopy with injection of kenalog steroid to subglottic larynx.     ANESTHESIA  Type: General  LMA    SURGEON:   Nadine Hoffmann MD    RESIDENT SURGEON(S):   Karina Harris MD    INDICATIONS FOR PROCEDURE:    The patient is a 31 year old female with a history of subglottic stenosis. She is being brought to the Operating Room for subsequent treatment of the stenosis. She underwent flex bronch with CRE balloon dilation to 12mmHg on 11/5/19, 12/22/21, 2/2/22. Now she is doing an awakes steroid injection series, first one on 3/18/22, second one on 4/15/22,third one on 5/27/22. She had a third one on 6/17/22 and then 7/15/22. She returned for repeat surgery on 12/16/22. She reutrns today for surgery. Risks, benefits, alternatives, and rationale for the procedure(s) listed above were discussed with the patient, and the patient wishes to proceed with surgery and has signed an informed consent.         FINDINGS:   1. Entire procedure done via LMA with spontaneous ventilation  2. Subglottic stenosis: 1.0cm distal to the vocal folds, 1.0cm thick, grade 1, 30% narrowing, posterior scar band, could not laser, but opened with a balloon  3. CRE balloon to 15 mmHg        DESCRIPTION OF PROCEDURE:   The patient was brought into the operating room and placed supine on the operating room table. A time-out was performed. General anesthesia was induced. The patient was mask ventilated. Then the patient was ventilated with a LMA.     The head of the bed was turned 90 degrees to the right. A flexible bronchoscope was placed through the LMA. 4% LTA was infused over the  glottic entry. Once the topical anesthesia had been placed, the flexible bronchoscope was placed through the vocal cords. The patient had evidence of approximately grade 1, 30% subglottic and tracheal narrowing. There was a scar bridge posterior     The procedure began with first performing CO2 laser. The flexible waveguide CO2 laser was threaded through the bronchoscope. A laser time-out was performed. The patient's face and eyes were protected with moist towels. The oxygen was subsequently reduced to less than 30%. Radial cuts were made within the scar band starting anteriorly through the scar bridge and then more cuts were made laterally and posteriorly.     The patient was then allowed to obtain adequate saturation and the balloon dilator was then placed through the flexible bronchoscope. Dilations were performed to 15 mmHg. Once the subglottic area had been adequately dilated, the patient was once again allowed to obtain 100% saturation.     Then 1.0 ml of kenalog 40 steroid  was injected into the scar via a flexible injector circumferentially on the right and left.     Hemostasis was confirmed. This was the end of our procedure. The instrumentation was carefully. The patient was then handed back to the care of anesthesia who awoke the patient without complications.    COMPLICATIONS:   None.  .   ESTIMATED BLOOD LOSS:   Less than 10cc    DISPOSITION:   PACU.      SPECIMENS:  * No specimens in log *       PHOTODOCUMENTATION:

## 2025-01-15 NOTE — DISCHARGE INSTRUCTIONS
Contacting your Doctor -   To contact a doctor, call Dr Susan Hoffmann at the Otolaryngology Clinic at [phone]  or:  After 5pm and weekends, call [phone] and ask for the resident on call for OTOLARYNGOLOGY (answered 24 hours a day)   Emergency Department:  [facility]: [phone]  [phone] if you are in need of immediate or emergent help

## 2025-01-16 ASSESSMENT — ACTIVITIES OF DAILY LIVING (ADL)
ADLS_ACUITY_SCORE: 36

## 2025-01-17 ASSESSMENT — ACTIVITIES OF DAILY LIVING (ADL)
ADLS_ACUITY_SCORE: 36

## 2025-01-18 ASSESSMENT — ACTIVITIES OF DAILY LIVING (ADL)
ADLS_ACUITY_SCORE: 36

## 2025-01-19 ASSESSMENT — ACTIVITIES OF DAILY LIVING (ADL)
ADLS_ACUITY_SCORE: 36

## 2025-07-19 ENCOUNTER — HEALTH MAINTENANCE LETTER (OUTPATIENT)
Age: 35
End: 2025-07-19

## 2025-08-26 ENCOUNTER — PREP FOR PROCEDURE (OUTPATIENT)
Dept: OTOLARYNGOLOGY | Facility: CLINIC | Age: 35
End: 2025-08-26

## 2025-08-26 ENCOUNTER — OFFICE VISIT (OUTPATIENT)
Dept: OTOLARYNGOLOGY | Facility: CLINIC | Age: 35
End: 2025-08-26
Payer: COMMERCIAL

## 2025-08-26 VITALS
HEART RATE: 101 BPM | SYSTOLIC BLOOD PRESSURE: 118 MMHG | HEIGHT: 65 IN | BODY MASS INDEX: 28.99 KG/M2 | WEIGHT: 174 LBS | DIASTOLIC BLOOD PRESSURE: 87 MMHG | OXYGEN SATURATION: 96 %

## 2025-08-26 DIAGNOSIS — J38.6 SUBGLOTTIC STENOSIS: Primary | ICD-10-CM

## 2025-08-26 DIAGNOSIS — K21.9 LARYNGOPHARYNGEAL REFLUX: ICD-10-CM

## 2025-08-26 PROCEDURE — 3074F SYST BP LT 130 MM HG: CPT | Performed by: OTOLARYNGOLOGY

## 2025-08-26 PROCEDURE — 99214 OFFICE O/P EST MOD 30 MIN: CPT | Mod: 25 | Performed by: OTOLARYNGOLOGY

## 2025-08-26 PROCEDURE — 3079F DIAST BP 80-89 MM HG: CPT | Performed by: OTOLARYNGOLOGY

## 2025-08-26 PROCEDURE — 31575 DIAGNOSTIC LARYNGOSCOPY: CPT | Performed by: OTOLARYNGOLOGY

## 2025-08-26 RX ORDER — SEMAGLUTIDE 1 MG/.5ML
1 INJECTION, SOLUTION SUBCUTANEOUS
COMMUNITY
Start: 2025-06-19

## 2025-08-26 RX ORDER — ERGOCALCIFEROL 1.25 MG/1
50000 CAPSULE ORAL WEEKLY
COMMUNITY
Start: 2025-03-29

## 2025-08-26 RX ORDER — OXYMETAZOLINE HYDROCHLORIDE 0.05 G/100ML
2 SPRAY NASAL ONCE
OUTPATIENT
Start: 2025-08-26 | End: 2025-08-26

## 2025-08-26 RX ORDER — SERTRALINE HYDROCHLORIDE 100 MG/1
100 TABLET, FILM COATED ORAL DAILY
COMMUNITY
Start: 2025-02-21

## 2025-08-26 RX ORDER — OMEPRAZOLE 40 MG/1
40 CAPSULE, DELAYED RELEASE ORAL 2 TIMES DAILY
Qty: 60 CAPSULE | Refills: 0 | Status: SHIPPED | OUTPATIENT
Start: 2025-08-26 | End: 2025-09-25

## 2025-08-27 ENCOUNTER — TELEPHONE (OUTPATIENT)
Dept: OTOLARYNGOLOGY | Facility: CLINIC | Age: 35
End: 2025-08-27
Payer: COMMERCIAL

## (undated) DEVICE — DILATOR CRE PULM BALLOON 10-11-12MMX75CM 3CM WIRE 5034

## (undated) DEVICE — SYR 10ML SLIP TIP W/O NDL 303134

## (undated) DEVICE — NDL 18GA 1.5" 305196

## (undated) DEVICE — TUBING SUCTION 10'X3/16" N510

## (undated) DEVICE — SUCTION MANIFOLD NEPTUNE 2 SYS 4 PORT 0702-020-000

## (undated) DEVICE — TAPE DURAPORE 1"X1.5YD SILK 1538S-1

## (undated) DEVICE — SOL WATER IRRIG 500ML BOTTLE 2F7113

## (undated) DEVICE — SYR 03ML LL W/O NDL 309657

## (undated) DEVICE — PEN MARKING SKIN W/LABELS 31145884

## (undated) DEVICE — SPONGE COTTONOID 1/2X3" 20-07S

## (undated) DEVICE — DRSG GAUZE 4X4" 3033

## (undated) DEVICE — ENDO VALVE SUCTION BRONCH EVIS MAJ-209

## (undated) DEVICE — WIPE INSTRUMENT MEROCEL 400200

## (undated) DEVICE — GLOVE PROTEXIS POWDER FREE SMT 6.5  2D72PT65X

## (undated) DEVICE — SYR 01ML TBC SLIP TIP W/O NDL

## (undated) DEVICE — DILATOR CRE PULM BALLOON 12-13.5-15MMX75CM 3CM WIRE 5035

## (undated) DEVICE — ANTIFOG SOLUTION W/FOAM PAD CF-1001

## (undated) DEVICE — SOL NACL 0.9% IRRIG 1000ML BOTTLE 2F7124

## (undated) DEVICE — JELLY LUBRICATING SURGILUBE 2OZ TUBE

## (undated) DEVICE — LINEN TOWEL PACK X5 5464

## (undated) DEVICE — SUCTION TIP YANKAUER STR K87

## (undated) DEVICE — NDL 30GA 0.5" 305106

## (undated) DEVICE — EYE DRSG PAD OVAL

## (undated) DEVICE — FIBER LSR 2MR 1.04MM .5MM ACUPULSE FIBERLASE 40W 295UM CO2

## (undated) DEVICE — Device

## (undated) DEVICE — SYR INFLATOR AND GAUGE 60ML M00550601

## (undated) DEVICE — TUBING SUCTION MEDI-VAC SOFT 3/16"X20' N520A

## (undated) DEVICE — NDL SCLEROTHERAPY 1.8MM 26GA 200CM M00518160

## (undated) DEVICE — ENDO VALVE BX EVIS MAJ-210

## (undated) DEVICE — PACK ENT ENDOSCOPY UMMC

## (undated) DEVICE — ESU GROUND PAD ADULT W/CORD E7507

## (undated) DEVICE — KIT CONNECTOR FOR OLYMPUS ENDOSCOPES DEFENDO 100310

## (undated) DEVICE — DRSG EYE PAD STERILE 1.63X2.63" NON21600

## (undated) DEVICE — SYR 05ML SLIP TIP W/O NDL 309647

## (undated) DEVICE — KIT ENDO FIRST STEP DISINFECTANT 200ML W/POUCH EP-4

## (undated) DEVICE — SPONGE COTTONOID 1/2X3" 80-1407

## (undated) DEVICE — SYR PISTON URETHRAL 60ML 68000

## (undated) DEVICE — CUP AND LID 2PK 2OZ STERILE  SSK9006A

## (undated) DEVICE — ENDO ADPT BRONCH SWIVEL Y A1002

## (undated) DEVICE — NDL BUTTERFLY 25GA .75" 367298

## (undated) DEVICE — LABEL MEDICATION SYSTEM 3303-P

## (undated) DEVICE — SUCTION MANIFOLD DORNOCH ULTRA CART UL-CL500

## (undated) DEVICE — GLOVE BIOGEL PI ULTRATOUCH SZ 6.5 41165

## (undated) DEVICE — FIBERLASE CO2 FIBER

## (undated) DEVICE — NDL BLUNT 18GA 1" W/O FILTER 305181

## (undated) DEVICE — SOL WATER IRRIG 1000ML BOTTLE 2F7114

## (undated) DEVICE — SYR DILATION COOK 60 ML DS-60CC-S  G27112

## (undated) DEVICE — SYR 05ML LL W/O NDL

## (undated) DEVICE — PREP PAD ALCOHOL 6818

## (undated) DEVICE — STRAP UNIVERSAL POSITIONING 2-PIECE 4X47X76" 91-287

## (undated) DEVICE — SYR 10ML LL W/O NDL 302995

## (undated) DEVICE — ANTIFOG SOLUTION W/FOAM PAD 31142527

## (undated) DEVICE — ENDO TOOTH QUICK GUARD CONFORMING PROTECTION

## (undated) DEVICE — NDL SCLEROTHERAPY 5FR 25GA 200CM 60812

## (undated) DEVICE — ACUPULSE DUO CO2 LASER

## (undated) RX ORDER — PROPOFOL 10 MG/ML
INJECTION, EMULSION INTRAVENOUS
Status: DISPENSED
Start: 2021-12-22

## (undated) RX ORDER — FENTANYL CITRATE 50 UG/ML
INJECTION, SOLUTION INTRAMUSCULAR; INTRAVENOUS
Status: DISPENSED
Start: 2019-11-05

## (undated) RX ORDER — OXYCODONE HYDROCHLORIDE 5 MG/1
TABLET ORAL
Status: DISPENSED
Start: 2021-12-22

## (undated) RX ORDER — FENTANYL CITRATE 50 UG/ML
INJECTION, SOLUTION INTRAMUSCULAR; INTRAVENOUS
Status: DISPENSED
Start: 2025-01-15

## (undated) RX ORDER — DEXAMETHASONE SODIUM PHOSPHATE 4 MG/ML
INJECTION, SOLUTION INTRA-ARTICULAR; INTRALESIONAL; INTRAMUSCULAR; INTRAVENOUS; SOFT TISSUE
Status: DISPENSED
Start: 2021-12-22

## (undated) RX ORDER — TRIAMCINOLONE ACETONIDE 40 MG/ML
INJECTION, SUSPENSION INTRA-ARTICULAR; INTRAMUSCULAR
Status: DISPENSED
Start: 2021-12-22

## (undated) RX ORDER — LIDOCAINE HYDROCHLORIDE 20 MG/ML
SOLUTION OROPHARYNGEAL
Status: DISPENSED
Start: 2019-11-14

## (undated) RX ORDER — LIDOCAINE HYDROCHLORIDE 20 MG/ML
SOLUTION OROPHARYNGEAL
Status: DISPENSED
Start: 2021-12-02

## (undated) RX ORDER — TRIAMCINOLONE ACETONIDE 40 MG/ML
INJECTION, SUSPENSION INTRA-ARTICULAR; INTRAMUSCULAR
Status: DISPENSED
Start: 2022-02-02

## (undated) RX ORDER — HYDROMORPHONE HCL IN WATER/PF 6 MG/30 ML
PATIENT CONTROLLED ANALGESIA SYRINGE INTRAVENOUS
Status: DISPENSED
Start: 2021-12-22

## (undated) RX ORDER — FENTANYL CITRATE-0.9 % NACL/PF 10 MCG/ML
PLASTIC BAG, INJECTION (ML) INTRAVENOUS
Status: DISPENSED
Start: 2025-01-15

## (undated) RX ORDER — CEFAZOLIN SODIUM/WATER 2 G/20 ML
SYRINGE (ML) INTRAVENOUS
Status: DISPENSED
Start: 2022-12-16

## (undated) RX ORDER — HYDROMORPHONE HYDROCHLORIDE 1 MG/ML
INJECTION, SOLUTION INTRAMUSCULAR; INTRAVENOUS; SUBCUTANEOUS
Status: DISPENSED
Start: 2025-01-15

## (undated) RX ORDER — ONDANSETRON 2 MG/ML
INJECTION INTRAMUSCULAR; INTRAVENOUS
Status: DISPENSED
Start: 2021-12-22

## (undated) RX ORDER — LIDOCAINE HYDROCHLORIDE 40 MG/ML
SOLUTION TOPICAL
Status: DISPENSED
Start: 2022-06-17

## (undated) RX ORDER — FENTANYL CITRATE 50 UG/ML
INJECTION, SOLUTION INTRAMUSCULAR; INTRAVENOUS
Status: DISPENSED
Start: 2022-12-16

## (undated) RX ORDER — OXYMETAZOLINE HYDROCHLORIDE 0.05 G/100ML
SPRAY NASAL
Status: DISPENSED
Start: 2019-11-14

## (undated) RX ORDER — DEXAMETHASONE SODIUM PHOSPHATE 4 MG/ML
INJECTION, SOLUTION INTRA-ARTICULAR; INTRALESIONAL; INTRAMUSCULAR; INTRAVENOUS; SOFT TISSUE
Status: DISPENSED
Start: 2019-11-05

## (undated) RX ORDER — FENTANYL CITRATE 50 UG/ML
INJECTION, SOLUTION INTRAMUSCULAR; INTRAVENOUS
Status: DISPENSED
Start: 2021-12-22

## (undated) RX ORDER — EPHEDRINE SULFATE 50 MG/ML
INJECTION, SOLUTION INTRAMUSCULAR; INTRAVENOUS; SUBCUTANEOUS
Status: DISPENSED
Start: 2021-12-22

## (undated) RX ORDER — LIDOCAINE HYDROCHLORIDE 20 MG/ML
INJECTION, SOLUTION EPIDURAL; INFILTRATION; INTRACAUDAL; PERINEURAL
Status: DISPENSED
Start: 2021-12-22

## (undated) RX ORDER — TRIAMCINOLONE ACETONIDE 40 MG/ML
INJECTION, SUSPENSION INTRA-ARTICULAR; INTRAMUSCULAR
Status: DISPENSED
Start: 2019-11-05

## (undated) RX ORDER — DEXAMETHASONE SODIUM PHOSPHATE 4 MG/ML
INJECTION, SOLUTION INTRA-ARTICULAR; INTRALESIONAL; INTRAMUSCULAR; INTRAVENOUS; SOFT TISSUE
Status: DISPENSED
Start: 2025-01-15

## (undated) RX ORDER — LIDOCAINE HYDROCHLORIDE 40 MG/ML
SOLUTION TOPICAL
Status: DISPENSED
Start: 2022-02-02

## (undated) RX ORDER — ESMOLOL HYDROCHLORIDE 10 MG/ML
INJECTION INTRAVENOUS
Status: DISPENSED
Start: 2021-12-22

## (undated) RX ORDER — OXYMETAZOLINE HYDROCHLORIDE 0.05 G/100ML
SPRAY NASAL
Status: DISPENSED
Start: 2021-12-22

## (undated) RX ORDER — PROPOFOL 10 MG/ML
INJECTION, EMULSION INTRAVENOUS
Status: DISPENSED
Start: 2025-01-15

## (undated) RX ORDER — ACETAMINOPHEN 325 MG/1
TABLET ORAL
Status: DISPENSED
Start: 2021-12-22

## (undated) RX ORDER — LIDOCAINE HYDROCHLORIDE 20 MG/ML
INJECTION, SOLUTION EPIDURAL; INFILTRATION; INTRACAUDAL; PERINEURAL
Status: DISPENSED
Start: 2019-11-05

## (undated) RX ORDER — CEFAZOLIN SODIUM 2 G/100ML
INJECTION, SOLUTION INTRAVENOUS
Status: DISPENSED
Start: 2019-11-05

## (undated) RX ORDER — FENTANYL CITRATE-0.9 % NACL/PF 10 MCG/ML
PLASTIC BAG, INJECTION (ML) INTRAVENOUS
Status: DISPENSED
Start: 2021-12-22

## (undated) RX ORDER — ACETAMINOPHEN 325 MG/1
TABLET ORAL
Status: DISPENSED
Start: 2025-01-15

## (undated) RX ORDER — LIDOCAINE HYDROCHLORIDE 20 MG/ML
SOLUTION OROPHARYNGEAL
Status: DISPENSED
Start: 2020-01-02

## (undated) RX ORDER — OXYCODONE HYDROCHLORIDE 5 MG/1
TABLET ORAL
Status: DISPENSED
Start: 2025-01-15

## (undated) RX ORDER — LIDOCAINE HYDROCHLORIDE 20 MG/ML
SOLUTION OROPHARYNGEAL
Status: DISPENSED
Start: 2019-11-01

## (undated) RX ORDER — LIDOCAINE HYDROCHLORIDE 20 MG/ML
SOLUTION OROPHARYNGEAL
Status: DISPENSED
Start: 2022-08-23

## (undated) RX ORDER — CEFAZOLIN SODIUM/WATER 2 G/20 ML
SYRINGE (ML) INTRAVENOUS
Status: DISPENSED
Start: 2025-01-15

## (undated) RX ORDER — LIDOCAINE HYDROCHLORIDE 40 MG/ML
INJECTION, SOLUTION RETROBULBAR
Status: DISPENSED
Start: 2022-12-16

## (undated) RX ORDER — GLYCOPYRROLATE 0.2 MG/ML
INJECTION, SOLUTION INTRAMUSCULAR; INTRAVENOUS
Status: DISPENSED
Start: 2021-12-22

## (undated) RX ORDER — ONDANSETRON 2 MG/ML
INJECTION INTRAMUSCULAR; INTRAVENOUS
Status: DISPENSED
Start: 2025-01-15

## (undated) RX ORDER — TRIAMCINOLONE ACETONIDE 40 MG/ML
INJECTION, SUSPENSION INTRA-ARTICULAR; INTRAMUSCULAR
Status: DISPENSED
Start: 2022-12-16

## (undated) RX ORDER — ONDANSETRON 2 MG/ML
INJECTION INTRAMUSCULAR; INTRAVENOUS
Status: DISPENSED
Start: 2019-11-05

## (undated) RX ORDER — ACETAMINOPHEN 325 MG/1
TABLET ORAL
Status: DISPENSED
Start: 2019-11-05

## (undated) RX ORDER — OXYMETAZOLINE HYDROCHLORIDE 0.05 G/100ML
SPRAY NASAL
Status: DISPENSED
Start: 2022-12-16